# Patient Record
Sex: MALE | Race: WHITE | Employment: UNEMPLOYED | URBAN - NONMETROPOLITAN AREA
[De-identification: names, ages, dates, MRNs, and addresses within clinical notes are randomized per-mention and may not be internally consistent; named-entity substitution may affect disease eponyms.]

---

## 2017-02-03 ENCOUNTER — OFFICE VISIT (OUTPATIENT)
Dept: OTHER | Age: 57
End: 2017-02-03

## 2017-02-03 ENCOUNTER — HOSPITAL ENCOUNTER (OUTPATIENT)
Dept: OTHER | Age: 57
Discharge: HOME | End: 2017-02-03
Attending: NURSE PRACTITIONER | Admitting: NURSE PRACTITIONER

## 2017-02-03 VITALS
BODY MASS INDEX: 33.41 KG/M2 | DIASTOLIC BLOOD PRESSURE: 82 MMHG | SYSTOLIC BLOOD PRESSURE: 135 MMHG | WEIGHT: 207 LBS | HEART RATE: 90 BPM | OXYGEN SATURATION: 96 %

## 2017-02-03 DIAGNOSIS — E11.9 TYPE 2 DIABETES MELLITUS WITHOUT COMPLICATION, WITHOUT LONG-TERM CURRENT USE OF INSULIN (HCC): Primary | ICD-10-CM

## 2017-02-03 DIAGNOSIS — F33.1 MODERATE EPISODE OF RECURRENT MAJOR DEPRESSIVE DISORDER (HCC): ICD-10-CM

## 2017-02-03 DIAGNOSIS — F41.9 ANXIETY: ICD-10-CM

## 2017-02-03 DIAGNOSIS — Z00.00 HEALTH CARE MAINTENANCE: ICD-10-CM

## 2017-02-03 LAB — GLUCOSE BLD-MCNC: 94 MG/DL

## 2017-02-03 PROCEDURE — 96160 PT-FOCUSED HLTH RISK ASSMT: CPT | Performed by: NURSE PRACTITIONER

## 2017-02-03 PROCEDURE — 99203 OFFICE O/P NEW LOW 30 MIN: CPT | Performed by: NURSE PRACTITIONER

## 2017-02-03 RX ORDER — CETIRIZINE HYDROCHLORIDE 10 MG/1
10 TABLET ORAL DAILY
COMMUNITY
Start: 2017-01-20

## 2017-02-03 RX ORDER — POLYETHYLENE GLYCOL 3350 17 G/17G
POWDER, FOR SOLUTION ORAL
COMMUNITY
Start: 2017-01-09 | End: 2017-02-03

## 2017-02-03 RX ORDER — LORAZEPAM 0.5 MG/1
0.5 TABLET ORAL 2 TIMES DAILY
Qty: 60 TABLET | Refills: 0 | Status: SHIPPED | OUTPATIENT
Start: 2017-02-03 | End: 2017-02-17 | Stop reason: DRUGHIGH

## 2017-02-03 RX ORDER — CALCIUM CITRATE/VITAMIN D3 200MG-6.25
TABLET ORAL
COMMUNITY
Start: 2017-01-10 | End: 2017-02-03

## 2017-02-03 RX ORDER — FLUTICASONE PROPIONATE 50 MCG
1 SPRAY, SUSPENSION (ML) NASAL DAILY
COMMUNITY
Start: 2016-12-19 | End: 2017-02-28 | Stop reason: ALTCHOICE

## 2017-02-03 ASSESSMENT — PATIENT HEALTH QUESTIONNAIRE - PHQ9
7. TROUBLE CONCENTRATING ON THINGS, SUCH AS READING THE NEWSPAPER OR WATCHING TELEVISION: 3
1. LITTLE INTEREST OR PLEASURE IN DOING THINGS: 0
SUM OF ALL RESPONSES TO PHQ QUESTIONS 1-9: 12
6. FEELING BAD ABOUT YOURSELF - OR THAT YOU ARE A FAILURE OR HAVE LET YOURSELF OR YOUR FAMILY DOWN: 0
5. POOR APPETITE OR OVEREATING: 1
2. FEELING DOWN, DEPRESSED OR HOPELESS: 3
10. IF YOU CHECKED OFF ANY PROBLEMS, HOW DIFFICULT HAVE THESE PROBLEMS MADE IT FOR YOU TO DO YOUR WORK, TAKE CARE OF THINGS AT HOME, OR GET ALONG WITH OTHER PEOPLE: 3
9. THOUGHTS THAT YOU WOULD BE BETTER OFF DEAD, OR OF HURTING YOURSELF: 0
3. TROUBLE FALLING OR STAYING ASLEEP: 3
SUM OF ALL RESPONSES TO PHQ9 QUESTIONS 1 & 2: 3
4. FEELING TIRED OR HAVING LITTLE ENERGY: 1
8. MOVING OR SPEAKING SO SLOWLY THAT OTHER PEOPLE COULD HAVE NOTICED. OR THE OPPOSITE, BEING SO FIGETY OR RESTLESS THAT YOU HAVE BEEN MOVING AROUND A LOT MORE THAN USUAL: 1

## 2017-02-03 ASSESSMENT — ENCOUNTER SYMPTOMS
EYES NEGATIVE: 1
GASTROINTESTINAL NEGATIVE: 1
ALLERGIC/IMMUNOLOGIC NEGATIVE: 1
RESPIRATORY NEGATIVE: 1

## 2017-02-17 ENCOUNTER — HOSPITAL ENCOUNTER (OUTPATIENT)
Dept: OTHER | Age: 57
Discharge: HOME | End: 2017-02-17
Attending: NURSE PRACTITIONER | Admitting: NURSE PRACTITIONER

## 2017-02-17 ENCOUNTER — OFFICE VISIT (OUTPATIENT)
Dept: OTHER | Age: 57
End: 2017-02-17

## 2017-02-17 VITALS
BODY MASS INDEX: 34.06 KG/M2 | OXYGEN SATURATION: 95 % | WEIGHT: 211 LBS | HEART RATE: 95 BPM | DIASTOLIC BLOOD PRESSURE: 83 MMHG | SYSTOLIC BLOOD PRESSURE: 125 MMHG

## 2017-02-17 DIAGNOSIS — Z15.89 MONOALLELIC MUTATION OF SLC6A4 GENE: ICD-10-CM

## 2017-02-17 DIAGNOSIS — Z15.89 MONOALLELIC MUTATION OF CYP2D6 GENE: ICD-10-CM

## 2017-02-17 DIAGNOSIS — Z15.89: ICD-10-CM

## 2017-02-17 DIAGNOSIS — F41.9 ANXIETY: ICD-10-CM

## 2017-02-17 DIAGNOSIS — Z71.2 ENCOUNTER TO DISCUSS TEST RESULTS: ICD-10-CM

## 2017-02-17 DIAGNOSIS — J32.0 CHRONIC MAXILLARY SINUSITIS: ICD-10-CM

## 2017-02-17 DIAGNOSIS — F33.1 MODERATE EPISODE OF RECURRENT MAJOR DEPRESSIVE DISORDER (HCC): Primary | ICD-10-CM

## 2017-02-17 PROCEDURE — 99215 OFFICE O/P EST HI 40 MIN: CPT | Performed by: NURSE PRACTITIONER

## 2017-02-17 RX ORDER — AMOXICILLIN AND CLAVULANATE POTASSIUM 875; 125 MG/1; MG/1
1 TABLET, FILM COATED ORAL 2 TIMES DAILY
Qty: 20 TABLET | Refills: 0 | Status: SHIPPED | OUTPATIENT
Start: 2017-02-17 | End: 2017-02-27

## 2017-02-17 RX ORDER — BUPROPION HYDROCHLORIDE 75 MG/1
75 TABLET ORAL 2 TIMES DAILY
Qty: 60 TABLET | Refills: 3 | Status: SHIPPED | OUTPATIENT
Start: 2017-02-17 | End: 2017-03-14

## 2017-02-17 RX ORDER — LORAZEPAM 0.5 MG/1
0.5 TABLET ORAL 3 TIMES DAILY PRN
Qty: 90 TABLET | Refills: 0 | Status: SHIPPED | OUTPATIENT
Start: 2017-02-17 | End: 2017-03-14 | Stop reason: ALTCHOICE

## 2017-02-17 ASSESSMENT — ENCOUNTER SYMPTOMS
SINUS PRESSURE: 1
ALLERGIC/IMMUNOLOGIC NEGATIVE: 1
GASTROINTESTINAL NEGATIVE: 1
RESPIRATORY NEGATIVE: 1
EYES NEGATIVE: 1

## 2017-03-14 ENCOUNTER — OFFICE VISIT (OUTPATIENT)
Dept: FAMILY MEDICINE CLINIC | Age: 57
End: 2017-03-14

## 2017-03-14 VITALS
WEIGHT: 209 LBS | HEIGHT: 65 IN | BODY MASS INDEX: 34.82 KG/M2 | RESPIRATION RATE: 16 BRPM | DIASTOLIC BLOOD PRESSURE: 70 MMHG | SYSTOLIC BLOOD PRESSURE: 130 MMHG | HEART RATE: 68 BPM

## 2017-03-14 DIAGNOSIS — F13.20 BENZODIAZEPINE DEPENDENCE (HCC): ICD-10-CM

## 2017-03-14 DIAGNOSIS — Z51.81 MEDICATION MONITORING ENCOUNTER: ICD-10-CM

## 2017-03-14 DIAGNOSIS — E11.9 TYPE 2 DIABETES MELLITUS WITHOUT COMPLICATION, WITHOUT LONG-TERM CURRENT USE OF INSULIN (HCC): Primary | ICD-10-CM

## 2017-03-14 DIAGNOSIS — J01.00 ACUTE NON-RECURRENT MAXILLARY SINUSITIS: ICD-10-CM

## 2017-03-14 DIAGNOSIS — F41.9 ANXIETY: ICD-10-CM

## 2017-03-14 DIAGNOSIS — R00.2 PALPITATIONS: ICD-10-CM

## 2017-03-14 LAB
AMPHETAMINE SCREEN, URINE: NEGATIVE
BARBITURATE SCREEN, URINE: NEGATIVE
BENZODIAZEPINE SCREEN, URINE: NEGATIVE
COCAINE METABOLITE SCREEN URINE: NEGATIVE
HBA1C MFR BLD: 7.1 %
MDMA URINE: NORMAL
METHADONE SCREEN, URINE: NORMAL
METHAMPHETAMINE, URINE: NEGATIVE
OPIATE SCREEN URINE: NEGATIVE
OXYCODONE SCREEN URINE: NEGATIVE
PHENCYCLIDINE SCREEN URINE: NEGATIVE
PROPOXYPHENE SCREEN, URINE: NORMAL
THC: NEGATIVE
TRICYCLIC ANTIDEPRESSANTS, UR: NEGATIVE

## 2017-03-14 PROCEDURE — 83036 HEMOGLOBIN GLYCOSYLATED A1C: CPT | Performed by: NURSE PRACTITIONER

## 2017-03-14 PROCEDURE — 99214 OFFICE O/P EST MOD 30 MIN: CPT | Performed by: NURSE PRACTITIONER

## 2017-03-14 PROCEDURE — 80305 DRUG TEST PRSMV DIR OPT OBS: CPT | Performed by: NURSE PRACTITIONER

## 2017-03-14 RX ORDER — HYDROXYZINE PAMOATE 50 MG/1
50 CAPSULE ORAL 4 TIMES DAILY PRN
Qty: 120 CAPSULE | Refills: 0 | Status: ON HOLD | OUTPATIENT
Start: 2017-03-14 | End: 2017-03-19

## 2017-03-14 RX ORDER — DESVENLAFAXINE 50 MG/1
50 TABLET, EXTENDED RELEASE ORAL DAILY
Qty: 30 TABLET | Refills: 0 | Status: SHIPPED | OUTPATIENT
Start: 2017-03-14 | End: 2017-03-24 | Stop reason: CLARIF

## 2017-03-14 RX ORDER — LORAZEPAM 0.5 MG/1
TABLET ORAL
Qty: 18 TABLET | Refills: 0 | Status: SHIPPED | OUTPATIENT
Start: 2017-03-14 | End: 2017-03-24 | Stop reason: ALTCHOICE

## 2017-03-14 RX ORDER — DOXYCYCLINE HYCLATE 100 MG
100 TABLET ORAL 2 TIMES DAILY
Qty: 20 TABLET | Refills: 0 | Status: SHIPPED | OUTPATIENT
Start: 2017-03-14 | End: 2017-03-16

## 2017-03-14 ASSESSMENT — PATIENT HEALTH QUESTIONNAIRE - PHQ9
SUM OF ALL RESPONSES TO PHQ9 QUESTIONS 1 & 2: 2
1. LITTLE INTEREST OR PLEASURE IN DOING THINGS: 1
2. FEELING DOWN, DEPRESSED OR HOPELESS: 1
SUM OF ALL RESPONSES TO PHQ QUESTIONS 1-9: 2

## 2017-03-15 PROBLEM — F13.20 BENZODIAZEPINE DEPENDENCE (HCC): Status: ACTIVE | Noted: 2017-03-15

## 2017-03-15 PROBLEM — Z51.81 MEDICATION MONITORING ENCOUNTER: Status: ACTIVE | Noted: 2017-03-15

## 2017-03-15 ASSESSMENT — ENCOUNTER SYMPTOMS
EYES NEGATIVE: 1
COUGH: 0
STRIDOR: 0
SHORTNESS OF BREATH: 0

## 2017-03-16 ENCOUNTER — NURSE TRIAGE (OUTPATIENT)
Dept: ADMINISTRATIVE | Age: 57
End: 2017-03-16

## 2017-03-17 ENCOUNTER — TELEPHONE (OUTPATIENT)
Dept: FAMILY MEDICINE CLINIC | Age: 57
End: 2017-03-17

## 2017-03-23 ENCOUNTER — TELEPHONE (OUTPATIENT)
Dept: FAMILY MEDICINE CLINIC | Age: 57
End: 2017-03-23

## 2017-03-24 ENCOUNTER — OFFICE VISIT (OUTPATIENT)
Dept: FAMILY MEDICINE CLINIC | Age: 57
End: 2017-03-24

## 2017-03-24 VITALS
WEIGHT: 204 LBS | HEART RATE: 58 BPM | DIASTOLIC BLOOD PRESSURE: 80 MMHG | BODY MASS INDEX: 33.99 KG/M2 | HEIGHT: 65 IN | RESPIRATION RATE: 20 BRPM | OXYGEN SATURATION: 97 % | SYSTOLIC BLOOD PRESSURE: 122 MMHG

## 2017-03-24 DIAGNOSIS — F41.9 ANXIETY: ICD-10-CM

## 2017-03-24 DIAGNOSIS — F13.20 BENZODIAZEPINE DEPENDENCE (HCC): ICD-10-CM

## 2017-03-24 DIAGNOSIS — Z51.81 MEDICATION MONITORING ENCOUNTER: Primary | ICD-10-CM

## 2017-03-24 DIAGNOSIS — E11.9 TYPE 2 DIABETES MELLITUS WITHOUT COMPLICATION, WITHOUT LONG-TERM CURRENT USE OF INSULIN (HCC): ICD-10-CM

## 2017-03-24 DIAGNOSIS — K21.9 GASTROESOPHAGEAL REFLUX DISEASE WITHOUT ESOPHAGITIS: Primary | ICD-10-CM

## 2017-03-24 DIAGNOSIS — Z51.81 MEDICATION MONITORING ENCOUNTER: ICD-10-CM

## 2017-03-24 DIAGNOSIS — F13.20 BENZODIAZEPINE DEPENDENCE (HCC): Primary | ICD-10-CM

## 2017-03-24 LAB
AMPHETAMINE SCREEN, URINE: NORMAL
BARBITURATE SCREEN, URINE: NORMAL
BENZODIAZEPINE SCREEN, URINE: NORMAL
COCAINE METABOLITE SCREEN URINE: NORMAL
MDMA URINE: NORMAL
METHADONE SCREEN, URINE: NORMAL
METHAMPHETAMINE, URINE: NORMAL
OPIATE SCREEN URINE: NORMAL
OXYCODONE SCREEN URINE: NORMAL
PHENCYCLIDINE SCREEN URINE: NORMAL
PROPOXYPHENE SCREEN, URINE: NORMAL
THC: NORMAL
TRICYCLIC ANTIDEPRESSANTS, UR: NORMAL

## 2017-03-24 PROCEDURE — 99214 OFFICE O/P EST MOD 30 MIN: CPT | Performed by: NURSE PRACTITIONER

## 2017-03-24 PROCEDURE — 80305 DRUG TEST PRSMV DIR OPT OBS: CPT | Performed by: NURSE PRACTITIONER

## 2017-03-24 RX ORDER — RANITIDINE 150 MG/1
150 TABLET ORAL NIGHTLY
Qty: 30 TABLET | Refills: 1 | Status: SHIPPED | OUTPATIENT
Start: 2017-03-24 | End: 2017-04-24

## 2017-03-24 RX ORDER — CLONIDINE HYDROCHLORIDE 0.1 MG/1
TABLET ORAL
Qty: 60 TABLET | Refills: 1 | Status: SHIPPED | OUTPATIENT
Start: 2017-03-24 | End: 2017-03-28

## 2017-03-24 ASSESSMENT — ENCOUNTER SYMPTOMS: RESPIRATORY NEGATIVE: 1

## 2017-03-25 ENCOUNTER — NURSE TRIAGE (OUTPATIENT)
Dept: ADMINISTRATIVE | Age: 57
End: 2017-03-25

## 2017-03-28 ENCOUNTER — OFFICE VISIT (OUTPATIENT)
Dept: PSYCHOLOGY | Age: 57
End: 2017-03-28

## 2017-03-28 ENCOUNTER — OFFICE VISIT (OUTPATIENT)
Dept: FAMILY MEDICINE CLINIC | Age: 57
End: 2017-03-28

## 2017-03-28 VITALS
BODY MASS INDEX: 32.6 KG/M2 | DIASTOLIC BLOOD PRESSURE: 78 MMHG | SYSTOLIC BLOOD PRESSURE: 124 MMHG | RESPIRATION RATE: 16 BRPM | HEART RATE: 104 BPM | WEIGHT: 202 LBS

## 2017-03-28 DIAGNOSIS — Z76.5 DRUG-SEEKING BEHAVIOR: ICD-10-CM

## 2017-03-28 DIAGNOSIS — F41.9 ANXIETY: Primary | ICD-10-CM

## 2017-03-28 DIAGNOSIS — Z00.00 ROUTINE HEALTH MAINTENANCE: ICD-10-CM

## 2017-03-28 LAB
6-ACETYLMORPHINE: NOT DETECTED
7-AMINOCLONAZEPAM: PRESENT
A/G RATIO: 1.3 (ref 1.1–2.2)
ALBUMIN SERPL-MCNC: 4 G/DL (ref 3.4–5)
ALP BLD-CCNC: 68 U/L (ref 40–129)
ALPHA-OH-ALPRAZOLAM: NOT DETECTED
ALPRAZOLAM: NOT DETECTED
ALT SERPL-CCNC: 25 U/L (ref 10–40)
AMPHETAMINE: NOT DETECTED
ANION GAP SERPL CALCULATED.3IONS-SCNC: 19 MMOL/L (ref 3–16)
AST SERPL-CCNC: 19 U/L (ref 15–37)
BARBITURATES: NOT DETECTED
BENZOYLECGONINE: NOT DETECTED
BILIRUB SERPL-MCNC: 0.7 MG/DL (ref 0–1)
BUN BLDV-MCNC: 16 MG/DL (ref 7–20)
BUPRENORPHINE: NOT DETECTED
CALCIUM SERPL-MCNC: 9.2 MG/DL (ref 8.3–10.6)
CARISOPRODOL: NOT DETECTED
CHLORIDE BLD-SCNC: 101 MMOL/L (ref 99–110)
CHOLESTEROL, TOTAL: 184 MG/DL (ref 0–199)
CLONAZEPAM: NOT DETECTED
CO2: 21 MMOL/L (ref 21–32)
CODEINE: NOT DETECTED
CREAT SERPL-MCNC: 1.1 MG/DL (ref 0.9–1.3)
CREATININE URINE: 291.1 MG/DL (ref 20–400)
DIAZEPAM: NOT DETECTED
DRUGS EXPECTED: NORMAL
EER PAIN MGT DRUG PANEL, HIGH RES/EMIT U: NORMAL
ETHYL GLUCURONIDE: NOT DETECTED
FENTANYL: NOT DETECTED
GFR AFRICAN AMERICAN: >60
GFR NON-AFRICAN AMERICAN: >60
GLOBULIN: 3 G/DL
GLUCOSE BLD-MCNC: 154 MG/DL (ref 70–99)
HCT VFR BLD CALC: 52 % (ref 40.5–52.5)
HDLC SERPL-MCNC: 34 MG/DL (ref 40–60)
HEMOGLOBIN: 17.2 G/DL (ref 13.5–17.5)
HEPATITIS C ANTIBODY INTERPRETATION: NORMAL
HYDROCODONE: NOT DETECTED
HYDROMORPHONE: NOT DETECTED
LDL CHOLESTEROL CALCULATED: 119 MG/DL
LORAZEPAM: PRESENT
MARIJUANA METABOLITE: NOT DETECTED
MCH RBC QN AUTO: 30.5 PG (ref 26–34)
MCHC RBC AUTO-ENTMCNC: 33.1 G/DL (ref 31–36)
MCV RBC AUTO: 92.2 FL (ref 80–100)
MDA: NOT DETECTED
MDEA: NOT DETECTED
MDMA URINE: NOT DETECTED
MEPERIDINE: NOT DETECTED
METHADONE: NOT DETECTED
METHAMPHETAMINE: NOT DETECTED
METHYLPHENIDATE: NOT DETECTED
MIDAZOLAM: NOT DETECTED
MORPHINE: NOT DETECTED
NORBUPRENORPHINE, FREE: NOT DETECTED
NORDIAZEPAM: NOT DETECTED
NORFENTANYL: NOT DETECTED
NORHYDROCODONE, URINE: NOT DETECTED
NOROXYCODONE: NOT DETECTED
NOROXYMORPHONE, URINE: NOT DETECTED
OXAZEPAM: NOT DETECTED
OXYCODONE: NOT DETECTED
OXYMORPHONE: NOT DETECTED
PAIN MANAGEMENT DRUG PANEL: NORMAL
PAIN MANAGEMENT DRUG PANEL: NORMAL
PCP: NOT DETECTED
PDW BLD-RTO: 13.9 % (ref 12.4–15.4)
PHENTERMINE: NOT DETECTED
PLATELET # BLD: 229 K/UL (ref 135–450)
PMV BLD AUTO: 8.6 FL (ref 5–10.5)
POTASSIUM SERPL-SCNC: 4.2 MMOL/L (ref 3.5–5.1)
PROPOXYPHENE: NOT DETECTED
RBC # BLD: 5.64 M/UL (ref 4.2–5.9)
SODIUM BLD-SCNC: 141 MMOL/L (ref 136–145)
TAPENTADOL, URINE: NOT DETECTED
TAPENTADOL-O-SULFATE, URINE: NOT DETECTED
TEMAZEPAM: NOT DETECTED
TOTAL PROTEIN: 7 G/DL (ref 6.4–8.2)
TRAMADOL: NOT DETECTED
TRIGL SERPL-MCNC: 155 MG/DL (ref 0–150)
TSH REFLEX: 1.69 UIU/ML (ref 0.27–4.2)
VLDLC SERPL CALC-MCNC: 31 MG/DL
WBC # BLD: 7.9 K/UL (ref 4–11)
ZOLPIDEM: NOT DETECTED

## 2017-03-28 PROCEDURE — 90791 PSYCH DIAGNOSTIC EVALUATION: CPT | Performed by: PSYCHOLOGIST

## 2017-03-28 PROCEDURE — 80305 DRUG TEST PRSMV DIR OPT OBS: CPT | Performed by: NURSE PRACTITIONER

## 2017-03-28 PROCEDURE — 99214 OFFICE O/P EST MOD 30 MIN: CPT | Performed by: NURSE PRACTITIONER

## 2017-03-28 RX ORDER — BUSPIRONE HYDROCHLORIDE 15 MG/1
10 TABLET ORAL 3 TIMES DAILY PRN
Qty: 60 TABLET | Refills: 0 | Status: SHIPPED | OUTPATIENT
Start: 2017-03-28 | End: 2017-04-08

## 2017-03-28 ASSESSMENT — ENCOUNTER SYMPTOMS
WHEEZING: 0
SHORTNESS OF BREATH: 0
COUGH: 0
GASTROINTESTINAL NEGATIVE: 1

## 2017-03-29 LAB
AMPHETAMINE SCREEN, URINE: NORMAL
BARBITURATE SCREEN, URINE: NORMAL
BENZODIAZEPINE SCREEN, URINE: NORMAL
COCAINE METABOLITE SCREEN URINE: NORMAL
HIV-1 AND HIV-2 ANTIBODIES: NORMAL
MDMA URINE: NORMAL
METHADONE SCREEN, URINE: NORMAL
METHAMPHETAMINE, URINE: NORMAL
OPIATE SCREEN URINE: NORMAL
OXYCODONE SCREEN URINE: NORMAL
PHENCYCLIDINE SCREEN URINE: NORMAL
PROPOXYPHENE SCREEN, URINE: NORMAL
THC: NORMAL
TRICYCLIC ANTIDEPRESSANTS, UR: NORMAL

## 2017-04-04 ENCOUNTER — OFFICE VISIT (OUTPATIENT)
Dept: FAMILY MEDICINE CLINIC | Age: 57
End: 2017-04-04

## 2017-04-04 ENCOUNTER — TELEPHONE (OUTPATIENT)
Dept: FAMILY MEDICINE CLINIC | Age: 57
End: 2017-04-04

## 2017-04-04 ENCOUNTER — OFFICE VISIT (OUTPATIENT)
Dept: PSYCHOLOGY | Age: 57
End: 2017-04-04

## 2017-04-04 VITALS
DIASTOLIC BLOOD PRESSURE: 84 MMHG | BODY MASS INDEX: 32.82 KG/M2 | WEIGHT: 197 LBS | HEART RATE: 71 BPM | SYSTOLIC BLOOD PRESSURE: 120 MMHG | HEIGHT: 65 IN | OXYGEN SATURATION: 98 % | RESPIRATION RATE: 20 BRPM

## 2017-04-04 DIAGNOSIS — F13.20 BENZODIAZEPINE DEPENDENCE (HCC): ICD-10-CM

## 2017-04-04 DIAGNOSIS — F41.9 ANXIETY: Primary | ICD-10-CM

## 2017-04-04 DIAGNOSIS — Z91.09 ENVIRONMENTAL ALLERGIES: ICD-10-CM

## 2017-04-04 LAB
6-ACETYLMORPHINE: NOT DETECTED
7-AMINOCLONAZEPAM: NOT DETECTED
ALPHA-OH-ALPRAZOLAM: NOT DETECTED
ALPRAZOLAM: NOT DETECTED
AMPHETAMINE: NOT DETECTED
BARBITURATES: NOT DETECTED
BENZOYLECGONINE: NOT DETECTED
BUPRENORPHINE: NOT DETECTED
CARISOPRODOL: NOT DETECTED
CLONAZEPAM: NOT DETECTED
CODEINE: NOT DETECTED
CREATININE URINE: >400 MG/DL (ref 20–400)
DIAZEPAM: NOT DETECTED
DRUGS EXPECTED: ABNORMAL
EER PAIN MGT DRUG PANEL, HIGH RES/EMIT U: ABNORMAL
ETHYL GLUCURONIDE: NOT DETECTED
FENTANYL: NOT DETECTED
HYDROCODONE: NOT DETECTED
HYDROMORPHONE: NOT DETECTED
LORAZEPAM: NOT DETECTED
MARIJUANA METABOLITE: NOT DETECTED
MDA: NOT DETECTED
MDEA: NOT DETECTED
MDMA URINE: NOT DETECTED
MEPERIDINE: NOT DETECTED
METHADONE: NOT DETECTED
METHAMPHETAMINE: NOT DETECTED
METHYLPHENIDATE: NOT DETECTED
MIDAZOLAM: NOT DETECTED
MORPHINE: NOT DETECTED
NORBUPRENORPHINE, FREE: NOT DETECTED
NORDIAZEPAM: NOT DETECTED
NORFENTANYL: NOT DETECTED
NORHYDROCODONE, URINE: NOT DETECTED
NOROXYCODONE: NOT DETECTED
NOROXYMORPHONE, URINE: NOT DETECTED
OXAZEPAM: NOT DETECTED
OXYCODONE: NOT DETECTED
OXYMORPHONE: NOT DETECTED
PAIN MANAGEMENT DRUG PANEL: ABNORMAL
PAIN MANAGEMENT DRUG PANEL: ABNORMAL
PCP: NOT DETECTED
PHENTERMINE: NOT DETECTED
PROPOXYPHENE: NOT DETECTED
TAPENTADOL, URINE: NOT DETECTED
TAPENTADOL-O-SULFATE, URINE: NOT DETECTED
TEMAZEPAM: NOT DETECTED
TRAMADOL: NOT DETECTED
ZOLPIDEM: PRESENT

## 2017-04-04 PROCEDURE — 99214 OFFICE O/P EST MOD 30 MIN: CPT | Performed by: NURSE PRACTITIONER

## 2017-04-04 PROCEDURE — 90832 PSYTX W PT 30 MINUTES: CPT | Performed by: PSYCHOLOGIST

## 2017-04-04 ASSESSMENT — ENCOUNTER SYMPTOMS
SORE THROAT: 0
GASTROINTESTINAL NEGATIVE: 1
RESPIRATORY NEGATIVE: 1

## 2017-04-14 DIAGNOSIS — F41.9 ANXIETY: ICD-10-CM

## 2017-04-14 RX ORDER — BUSPIRONE HYDROCHLORIDE 15 MG/1
TABLET ORAL
Qty: 60 TABLET | Refills: 0 | Status: SHIPPED | OUTPATIENT
Start: 2017-04-14 | End: 2017-04-24 | Stop reason: ALTCHOICE

## 2017-04-17 ENCOUNTER — TELEPHONE (OUTPATIENT)
Dept: FAMILY MEDICINE CLINIC | Age: 57
End: 2017-04-17

## 2017-04-20 ENCOUNTER — OFFICE VISIT (OUTPATIENT)
Dept: CARDIOLOGY CLINIC | Age: 57
End: 2017-04-20

## 2017-04-20 VITALS
DIASTOLIC BLOOD PRESSURE: 70 MMHG | BODY MASS INDEX: 31.49 KG/M2 | HEIGHT: 65 IN | WEIGHT: 189 LBS | HEART RATE: 63 BPM | OXYGEN SATURATION: 97 % | SYSTOLIC BLOOD PRESSURE: 100 MMHG

## 2017-04-20 DIAGNOSIS — R00.2 PALPITATIONS: ICD-10-CM

## 2017-04-20 DIAGNOSIS — I48.0 PAF (PAROXYSMAL ATRIAL FIBRILLATION) (HCC): Primary | ICD-10-CM

## 2017-04-20 DIAGNOSIS — E11.9 TYPE 2 DIABETES MELLITUS WITHOUT COMPLICATION, WITHOUT LONG-TERM CURRENT USE OF INSULIN (HCC): ICD-10-CM

## 2017-04-20 DIAGNOSIS — G47.33 OSA (OBSTRUCTIVE SLEEP APNEA): ICD-10-CM

## 2017-04-20 DIAGNOSIS — R10.13 EPIGASTRIC PAIN: ICD-10-CM

## 2017-04-20 DIAGNOSIS — R07.2 PRECORDIAL PAIN: ICD-10-CM

## 2017-04-20 DIAGNOSIS — I10 ESSENTIAL HYPERTENSION: ICD-10-CM

## 2017-04-20 DIAGNOSIS — J01.00 ACUTE NON-RECURRENT MAXILLARY SINUSITIS: ICD-10-CM

## 2017-04-20 PROCEDURE — 99214 OFFICE O/P EST MOD 30 MIN: CPT | Performed by: INTERNAL MEDICINE

## 2017-04-20 RX ORDER — FLUTICASONE PROPIONATE 50 MCG
1 SPRAY, SUSPENSION (ML) NASAL DAILY
COMMUNITY
End: 2018-01-22

## 2017-04-25 ENCOUNTER — PROCEDURE VISIT (OUTPATIENT)
Dept: CARDIOLOGY CLINIC | Age: 57
End: 2017-04-25

## 2017-04-25 DIAGNOSIS — I48.0 PAF (PAROXYSMAL ATRIAL FIBRILLATION) (HCC): ICD-10-CM

## 2017-04-25 DIAGNOSIS — R00.2 PALPITATIONS: ICD-10-CM

## 2017-04-25 DIAGNOSIS — J01.00 ACUTE NON-RECURRENT MAXILLARY SINUSITIS: ICD-10-CM

## 2017-04-25 DIAGNOSIS — R10.13 EPIGASTRIC PAIN: ICD-10-CM

## 2017-04-25 DIAGNOSIS — I10 ESSENTIAL HYPERTENSION: ICD-10-CM

## 2017-04-25 DIAGNOSIS — E11.9 TYPE 2 DIABETES MELLITUS WITHOUT COMPLICATION, WITHOUT LONG-TERM CURRENT USE OF INSULIN (HCC): ICD-10-CM

## 2017-04-25 DIAGNOSIS — G47.33 OSA (OBSTRUCTIVE SLEEP APNEA): ICD-10-CM

## 2017-04-25 DIAGNOSIS — R07.2 PRECORDIAL PAIN: ICD-10-CM

## 2017-04-25 LAB
LV EF: 67 %
LVEF MODALITY: NORMAL

## 2017-04-25 PROCEDURE — 78452 HT MUSCLE IMAGE SPECT MULT: CPT | Performed by: INTERNAL MEDICINE

## 2017-04-25 PROCEDURE — A9500 TC99M SESTAMIBI: HCPCS | Performed by: INTERNAL MEDICINE

## 2017-04-25 PROCEDURE — 93018 CV STRESS TEST I&R ONLY: CPT | Performed by: INTERNAL MEDICINE

## 2017-04-25 PROCEDURE — 93017 CV STRESS TEST TRACING ONLY: CPT | Performed by: INTERNAL MEDICINE

## 2017-04-25 PROCEDURE — 93016 CV STRESS TEST SUPVJ ONLY: CPT | Performed by: INTERNAL MEDICINE

## 2017-04-26 ENCOUNTER — TELEPHONE (OUTPATIENT)
Dept: CARDIOLOGY CLINIC | Age: 57
End: 2017-04-26

## 2017-05-08 ENCOUNTER — HOSPITAL ENCOUNTER (OUTPATIENT)
Dept: GENERAL RADIOLOGY | Age: 57
Discharge: HOME | End: 2017-05-08
Attending: SPECIALIST | Admitting: SPECIALIST

## 2017-05-08 DIAGNOSIS — K21.9 GASTROESOPHAGEAL REFLUX DISEASE, ESOPHAGITIS PRESENCE NOT SPECIFIED: ICD-10-CM

## 2017-05-08 DIAGNOSIS — Z80.0 FAMILY HISTORY OF MALIGNANT NEOPLASM OF GASTROINTESTINAL TRACT: ICD-10-CM

## 2017-05-08 DIAGNOSIS — K21.9 GASTRO-ESOPHAGEAL REFLUX DISEASE WITHOUT ESOPHAGITIS: ICD-10-CM

## 2017-05-12 ENCOUNTER — NURSE TRIAGE (OUTPATIENT)
Dept: ADMINISTRATIVE | Age: 57
End: 2017-05-12

## 2017-05-16 ENCOUNTER — OFFICE VISIT (OUTPATIENT)
Dept: SURGERY | Age: 57
End: 2017-05-16

## 2017-05-16 VITALS
RESPIRATION RATE: 18 BRPM | HEART RATE: 84 BPM | DIASTOLIC BLOOD PRESSURE: 72 MMHG | BODY MASS INDEX: 29.19 KG/M2 | WEIGHT: 175.2 LBS | HEIGHT: 65 IN | SYSTOLIC BLOOD PRESSURE: 128 MMHG

## 2017-05-16 DIAGNOSIS — R10.13 EPIGASTRIC ABDOMINAL PAIN: Primary | ICD-10-CM

## 2017-05-16 PROCEDURE — 99213 OFFICE O/P EST LOW 20 MIN: CPT | Performed by: SURGERY

## 2017-05-16 ASSESSMENT — ENCOUNTER SYMPTOMS
EYES NEGATIVE: 1
ABDOMINAL PAIN: 1
NAUSEA: 1
RESPIRATORY NEGATIVE: 1

## 2017-05-17 ENCOUNTER — TELEPHONE (OUTPATIENT)
Dept: CARDIOLOGY CLINIC | Age: 57
End: 2017-05-17

## 2017-06-15 ENCOUNTER — HOSPITAL ENCOUNTER (OUTPATIENT)
Dept: GENERAL RADIOLOGY | Age: 57
Discharge: HOME | End: 2017-06-15
Attending: INTERNAL MEDICINE | Admitting: INTERNAL MEDICINE

## 2017-06-15 DIAGNOSIS — R06.02 SOB (SHORTNESS OF BREATH): ICD-10-CM

## 2017-06-26 ENCOUNTER — HOSPITAL ENCOUNTER (OUTPATIENT)
Dept: CARDIAC REHAB | Age: 57
Discharge: HOME | End: 2017-06-26
Attending: INTERNAL MEDICINE | Admitting: INTERNAL MEDICINE

## 2017-06-28 PROBLEM — G47.33 OBSTRUCTIVE SLEEP APNEA: Status: ACTIVE | Noted: 2017-06-28

## 2017-07-17 ENCOUNTER — OFFICE VISIT (OUTPATIENT)
Dept: CARDIOLOGY CLINIC | Age: 57
End: 2017-07-17

## 2017-07-17 VITALS
RESPIRATION RATE: 16 BRPM | HEIGHT: 65 IN | DIASTOLIC BLOOD PRESSURE: 70 MMHG | HEART RATE: 68 BPM | BODY MASS INDEX: 27.16 KG/M2 | WEIGHT: 163 LBS | SYSTOLIC BLOOD PRESSURE: 110 MMHG

## 2017-07-17 DIAGNOSIS — E78.2 MIXED HYPERLIPIDEMIA: ICD-10-CM

## 2017-07-17 DIAGNOSIS — G47.33 OSA (OBSTRUCTIVE SLEEP APNEA): ICD-10-CM

## 2017-07-17 DIAGNOSIS — F41.9 ANXIETY: ICD-10-CM

## 2017-07-17 DIAGNOSIS — I10 ESSENTIAL HYPERTENSION: Primary | ICD-10-CM

## 2017-07-17 DIAGNOSIS — E11.00 TYPE 2 DIABETES MELLITUS WITH HYPEROSMOLARITY WITHOUT COMA, WITHOUT LONG-TERM CURRENT USE OF INSULIN (HCC): ICD-10-CM

## 2017-07-17 PROCEDURE — 99213 OFFICE O/P EST LOW 20 MIN: CPT | Performed by: INTERNAL MEDICINE

## 2017-08-07 ENCOUNTER — NURSE TRIAGE (OUTPATIENT)
Dept: ADMINISTRATIVE | Age: 57
End: 2017-08-07

## 2017-09-14 PROBLEM — I48.91 ATRIAL FIBRILLATION WITH RVR (HCC): Status: ACTIVE | Noted: 2017-09-14

## 2017-09-15 PROBLEM — I48.91 ATRIAL FIBRILLATION WITH RVR (HCC): Status: RESOLVED | Noted: 2017-09-14 | Resolved: 2017-09-15

## 2017-09-18 ENCOUNTER — OFFICE VISIT (OUTPATIENT)
Dept: CARDIOLOGY CLINIC | Age: 57
End: 2017-09-18

## 2017-09-18 ENCOUNTER — NURSE ONLY (OUTPATIENT)
Dept: CARDIOLOGY CLINIC | Age: 57
End: 2017-09-18

## 2017-09-18 VITALS
BODY MASS INDEX: 28.32 KG/M2 | RESPIRATION RATE: 16 BRPM | WEIGHT: 170 LBS | DIASTOLIC BLOOD PRESSURE: 72 MMHG | SYSTOLIC BLOOD PRESSURE: 118 MMHG | HEART RATE: 80 BPM | HEIGHT: 65 IN

## 2017-09-18 DIAGNOSIS — I48.0 PAF (PAROXYSMAL ATRIAL FIBRILLATION) (HCC): Primary | ICD-10-CM

## 2017-09-18 DIAGNOSIS — E78.2 MIXED HYPERLIPIDEMIA: ICD-10-CM

## 2017-09-18 DIAGNOSIS — I10 ESSENTIAL HYPERTENSION: ICD-10-CM

## 2017-09-18 DIAGNOSIS — G47.33 OSA (OBSTRUCTIVE SLEEP APNEA): ICD-10-CM

## 2017-09-18 DIAGNOSIS — E11.00 TYPE 2 DIABETES MELLITUS WITH HYPEROSMOLARITY WITHOUT COMA, WITHOUT LONG-TERM CURRENT USE OF INSULIN (HCC): ICD-10-CM

## 2017-09-18 DIAGNOSIS — F41.9 ANXIETY: ICD-10-CM

## 2017-09-18 DIAGNOSIS — R00.2 PALPITATIONS: ICD-10-CM

## 2017-09-18 PROCEDURE — 93270 REMOTE 30 DAY ECG REV/REPORT: CPT | Performed by: INTERNAL MEDICINE

## 2017-09-18 PROCEDURE — 99214 OFFICE O/P EST MOD 30 MIN: CPT | Performed by: INTERNAL MEDICINE

## 2017-09-21 ENCOUNTER — TELEPHONE (OUTPATIENT)
Dept: CARDIOLOGY CLINIC | Age: 57
End: 2017-09-21

## 2017-09-22 ENCOUNTER — TELEPHONE (OUTPATIENT)
Dept: CARDIOLOGY CLINIC | Age: 57
End: 2017-09-22

## 2017-10-20 PROCEDURE — 93272 ECG/REVIEW INTERPRET ONLY: CPT | Performed by: INTERNAL MEDICINE

## 2017-10-23 ENCOUNTER — TELEPHONE (OUTPATIENT)
Dept: CARDIOLOGY CLINIC | Age: 57
End: 2017-10-23

## 2017-10-30 ENCOUNTER — OFFICE VISIT (OUTPATIENT)
Dept: CARDIOLOGY CLINIC | Age: 57
End: 2017-10-30

## 2017-10-30 VITALS
WEIGHT: 166 LBS | HEIGHT: 65 IN | HEART RATE: 60 BPM | BODY MASS INDEX: 27.66 KG/M2 | SYSTOLIC BLOOD PRESSURE: 112 MMHG | RESPIRATION RATE: 16 BRPM | DIASTOLIC BLOOD PRESSURE: 62 MMHG

## 2017-10-30 DIAGNOSIS — I10 ESSENTIAL HYPERTENSION: Primary | ICD-10-CM

## 2017-10-30 DIAGNOSIS — R00.2 PALPITATIONS: ICD-10-CM

## 2017-10-30 DIAGNOSIS — F41.9 ANXIETY: ICD-10-CM

## 2017-10-30 DIAGNOSIS — E11.00 TYPE 2 DIABETES MELLITUS WITH HYPEROSMOLARITY WITHOUT COMA, WITHOUT LONG-TERM CURRENT USE OF INSULIN (HCC): ICD-10-CM

## 2017-10-30 DIAGNOSIS — E78.2 MIXED HYPERLIPIDEMIA: ICD-10-CM

## 2017-10-30 PROCEDURE — 99213 OFFICE O/P EST LOW 20 MIN: CPT | Performed by: INTERNAL MEDICINE

## 2017-10-30 PROCEDURE — 3045F PR MOST RECENT HEMOGLOBIN A1C LEVEL 7.0-9.0%: CPT | Performed by: INTERNAL MEDICINE

## 2017-10-30 PROCEDURE — 3017F COLORECTAL CA SCREEN DOC REV: CPT | Performed by: INTERNAL MEDICINE

## 2017-10-30 PROCEDURE — G8427 DOCREV CUR MEDS BY ELIG CLIN: HCPCS | Performed by: INTERNAL MEDICINE

## 2017-10-30 PROCEDURE — G8484 FLU IMMUNIZE NO ADMIN: HCPCS | Performed by: INTERNAL MEDICINE

## 2017-10-30 PROCEDURE — G8417 CALC BMI ABV UP PARAM F/U: HCPCS | Performed by: INTERNAL MEDICINE

## 2017-10-30 PROCEDURE — 4004F PT TOBACCO SCREEN RCVD TLK: CPT | Performed by: INTERNAL MEDICINE

## 2017-10-30 NOTE — PROGRESS NOTES
Kameron Edouard  1960  [place], MD      Chief complaint and HPI:  Kameron Edouard  56years old continues to c/o palpitations and anxiety. Has had multiple ED visits. Worries about everything in life. Worries about  paroxysmal atrial fibrillation, hypertension, hyperlipidemia and shortness of breath. He states he gets shortness of breath after he eats. He states he has severe stomach problems. He denies chest pain, dizziness, edema or palpitations. He takes Tricor. He had last cholesterol check done in March. He is a former smoker. He states he has a lot of anxiety. He states he quit taking Eliquis because he was feeling numbness in his legs and hands. Rest of the Cardiovascular system review is otherwise unchanged from prior encounter. Past medical history:  has a past medical history of Acid reflux; Anesthesia; Anxiety; Arthritis; Back pain; Depression; Diabetes mellitus ([address] DM type 2 (diabetes mellitus, type 2) ([address] Fluttering heart; H/O cardiovascular stress test; H/O echocardiogram; Hernia; History of cardiac monitoring; HX OTHER MEDICAL; Hyperlipidemia; Hypertension; Liver disease; Nausea & vomiting; Osteoarthritis; PAF (paroxysmal atrial fibrillation) ([address] Panic attacks; Pneumonia; Prolonged emergence from general anesthesia; Shortness of breath; Sinusitis; Tobacco abuse; Wears dentures; and Wears glasses. Past surgical history:  has a past surgical history that includes hip surgery; sinus surgery; Upper gastrointestinal endoscopy; hip surgery (Right, 1978); Dental surgery; Colonoscopy (1-13); Endoscopy, colon, diagnostic (Last Done 1-13); fracture surgery (Left); sinus surgery (2010); Cholecystectomy (2/18/13); Gallbladder surgery; sinus surgery (07/2014); and other surgical history (Right, 12/13/2016).     Social History:   Social History   Substance Use Topics    Smoking status: Former Smoker     Packs/day: 1.00     Years: 4.00     Quit date: 2/15/1987    Smokeless (ATIVAN) 1 MG tablet Take 1 mg by mouth every 8 hours as needed    Yes Historical Provider, MD   fluticasone (FLONASE) 50 MCG/ACT nasal spray 1 spray by Nasal route daily   Yes Historical Provider, MD   zolpidem (AMBIEN) 10 MG tablet Take by mouth nightly as needed for Sleep   Yes Historical Provider, MD   cetirizine (ZYRTEC) 10 MG tablet Take 10 mg by mouth daily  1/20/17  Yes Historical Provider, MD   fenofibrate (TRICOR) 54 MG tablet Take 54 mg by mouth daily. s [facility] pharmacy: Please dispense generic fenofibrate unless prescriber denote   Yes Historical Provider, MD     Constitutional:  /62 (Site: Left Arm, Position: Sitting, Cuff Size: Medium Adult)   Pulse 60   Resp 16   Ht 5' 5\" (1.651 m)   Wt 166 lb (75.3 kg)   BMI 27.62 kg/m²    Body mass index is 27.62 kg/m². Wt Readings from Last 3 Encounters:   10/30/17 166 lb (75.3 kg)   09/18/17 170 lb (77.1 kg)   09/14/17 165 lb 6.4 oz (75 kg)     Chest:  Normal in shape and excursion     Cardiac:  Normal first and second heart sounds      Respiratory:  Normal breath sounds without wheezing or crepitations.     Andomen:  Soft non tender, no organomegaly. Bowel sounds are normal.     Extremities:  No cyanosis, clubbing or edema.     Vascular: +2 peripheral pulses in all four extremities.     Neuro Exam:  Non focal for any sensory or motor deficit.     Skin:  Warm and well perfused. Event monitor reported    Patient was monitored from September 18 through October 17, 2017.  12 transmissions are submitted for review during this period and all are consistent with normal sinus rhythm.  Heart rate ranged from 64 to106 bpm.  Patient reported palpitations during heart rate ranging from 72 to 106 bpm on multiple days.  Isolated PACs noted without any other clinically significant arrhythmias.     Conclusion: Findings suggesting normal sinus rhythm with physiologic heart rate variations and symptoms of palpitations during normal rate and rhythm and during mild degree of sinus tachycardia.     LAB REVIEW:  CBC:   Lab Results   Component Value Date    WBC 7.2 09/15/2017    HGB 16.4 09/15/2017    HCT 49.2 09/15/2017     09/15/2017     Lipids:   Lab Results   Component Value Date    CHOL 184 03/28/2017    TRIG 155 (H) 03/28/2017    HDL 34 (L) 03/28/2017    LDLCALC 119 (H) 03/28/2017    LDLDIRECT 146 (H) 06/16/2015    LABVLDL 31 03/28/2017     Renal:   Lab Results   Component Value Date    BUN 15 09/15/2017    CREATININE 1.0 09/15/2017     09/15/2017    K 4.1 09/15/2017     PT/INR:   Lab Results   Component Value Date    INR 1.22 09/14/2017       IMPRESSION and RECOMMENDATIONS:      Anxiety  See psychologist for counseling and for follow up    Hypertension  Well controlled on current medications, reviewed individually with patient. DM type 2 (diabetes mellitus, type 2) ([facility]  Follow up with PCP    Counseled for stress management and exercise for primary prevention by risk modification and lifestyle changes. Continue current cardiovascular medications which have been reviewed and discussed individually with you. Appropriate prescriptions if needed on this visit are addressed. After visit summery is provided. Questions answered and patient verbalizes understanding. Follow up in office in 12 months or as needed. Renee Sunshine MD, 10/30/2017 10:12 AM     Please note this report has been produced using speech recognition software and may contain errors related to that system including errors in grammar, punctuation, and spelling, as well as words and phrases that may be inappropriate.  If there are any questions or concerns please feel free to contact the dictating provider for clarification

## 2017-10-30 NOTE — PATIENT INSTRUCTIONS
Counseled for stress management and exercise for primary prevention by risk modification and lifestyle changes. Continue current cardiovascular medications which have been reviewed and discussed individually with you. Appropriate prescriptions if needed on this visit are addressed. After visit summery is provided. Questions answered and patient verbalizes understanding. Follow up in office in 12 months or as needed.

## 2018-01-25 ENCOUNTER — HOSPITAL ENCOUNTER (OUTPATIENT)
Dept: LAB | Age: 58
Discharge: HOME | End: 2018-01-25
Attending: NURSE PRACTITIONER | Admitting: NURSE PRACTITIONER

## 2018-01-25 LAB
ALT SERPL-CCNC: 14 U/L (ref 10–40)
ANION GAP SERPL CALCULATED.3IONS-SCNC: 9 MMOL/L (ref 4–16)
BASOPHILS ABSOLUTE: 0.1 K/CU MM
BASOPHILS RELATIVE PERCENT: 1.6 % (ref 0–1)
BUN BLDV-MCNC: 22 MG/DL (ref 6–23)
CALCIUM SERPL-MCNC: 9.5 MG/DL (ref 8.3–10.6)
CHLORIDE BLD-SCNC: 104 MMOL/L (ref 99–110)
CHOLESTEROL, FASTING: 188 MG/DL
CO2: 32 MMOL/L (ref 21–32)
CREAT SERPL-MCNC: 1.2 MG/DL (ref 0.9–1.3)
DIFFERENTIAL TYPE: ABNORMAL
EOSINOPHILS ABSOLUTE: 0.3 K/CU MM
EOSINOPHILS RELATIVE PERCENT: 4.9 % (ref 0–3)
ESTIMATED AVERAGE GLUCOSE: 111 MG/DL
GFR AFRICAN AMERICAN: >60 ML/MIN/1.73M2
GFR NON-AFRICAN AMERICAN: >60 ML/MIN/1.73M2
GLUCOSE FASTING: 110 MG/DL (ref 70–99)
HBA1C MFR BLD: 5.5 % (ref 4.2–6.3)
HCT VFR BLD CALC: 50.7 % (ref 42–52)
HDLC SERPL-MCNC: 44 MG/DL
HEMOGLOBIN: 16.9 GM/DL (ref 13.5–18)
IMMATURE NEUTROPHIL %: 0.2 % (ref 0–0.43)
LDL CHOLESTEROL DIRECT: 126 MG/DL
LYMPHOCYTES ABSOLUTE: 1.8 K/CU MM
LYMPHOCYTES RELATIVE PERCENT: 31 % (ref 24–44)
MCH RBC QN AUTO: 30.6 PG (ref 27–31)
MCHC RBC AUTO-ENTMCNC: 33.3 % (ref 32–36)
MCV RBC AUTO: 91.8 FL (ref 78–100)
MONOCYTES ABSOLUTE: 0.5 K/CU MM
MONOCYTES RELATIVE PERCENT: 9.1 % (ref 0–4)
PDW BLD-RTO: 12.9 % (ref 11.7–14.9)
PLATELET # BLD: 170 K/CU MM (ref 140–440)
PMV BLD AUTO: 9.5 FL (ref 7.5–11.1)
POTASSIUM SERPL-SCNC: 4.3 MMOL/L (ref 3.5–5.1)
PROSTATE SPECIFIC ANTIGEN: 0.85 NG/ML (ref 0–4)
RBC # BLD: 5.52 M/CU MM (ref 4.6–6.2)
SEGMENTED NEUTROPHILS ABSOLUTE COUNT: 3 K/CU MM
SEGMENTED NEUTROPHILS RELATIVE PERCENT: 53.2 % (ref 36–66)
SODIUM BLD-SCNC: 145 MMOL/L (ref 135–145)
TOTAL IMMATURE NEUTOROPHIL: 0.01 K/CU MM
TRIGLYCERIDE, FASTING: 94 MG/DL
TSH HIGH SENSITIVITY: 1.37 UIU/ML (ref 0.27–4.2)
WBC # BLD: 5.7 K/CU MM (ref 4–10.5)

## 2018-02-13 ENCOUNTER — TELEPHONE (OUTPATIENT)
Dept: CARDIOLOGY CLINIC | Age: 58
End: 2018-02-13

## 2018-02-15 ENCOUNTER — OFFICE VISIT (OUTPATIENT)
Dept: SURGERY | Age: 58
End: 2018-02-15

## 2018-02-15 VITALS
HEART RATE: 90 BPM | DIASTOLIC BLOOD PRESSURE: 84 MMHG | HEIGHT: 65 IN | SYSTOLIC BLOOD PRESSURE: 130 MMHG | WEIGHT: 171.3 LBS | BODY MASS INDEX: 28.54 KG/M2

## 2018-02-15 DIAGNOSIS — K21.00 GASTROESOPHAGEAL REFLUX DISEASE WITH ESOPHAGITIS: ICD-10-CM

## 2018-02-15 DIAGNOSIS — R10.13 EPIGASTRIC PAIN: Primary | ICD-10-CM

## 2018-02-15 PROCEDURE — G8427 DOCREV CUR MEDS BY ELIG CLIN: HCPCS | Performed by: SURGERY

## 2018-02-15 PROCEDURE — G8417 CALC BMI ABV UP PARAM F/U: HCPCS | Performed by: SURGERY

## 2018-02-15 PROCEDURE — G8484 FLU IMMUNIZE NO ADMIN: HCPCS | Performed by: SURGERY

## 2018-02-15 PROCEDURE — 99214 OFFICE O/P EST MOD 30 MIN: CPT | Performed by: SURGERY

## 2018-02-15 PROCEDURE — 4004F PT TOBACCO SCREEN RCVD TLK: CPT | Performed by: SURGERY

## 2018-02-15 PROCEDURE — 3017F COLORECTAL CA SCREEN DOC REV: CPT | Performed by: SURGERY

## 2018-02-15 RX ORDER — POLYETHYLENE GLYCOL 3350 17 G/17G
17 POWDER, FOR SOLUTION ORAL PRN
COMMUNITY
End: 2018-08-20

## 2018-02-15 RX ORDER — SUCRALFATE 1 G/1
1 TABLET ORAL 4 TIMES DAILY PRN
COMMUNITY
End: 2018-08-20

## 2018-02-15 NOTE — PROGRESS NOTES
SUBJECTIVE:  HPI:  Patient complains of abdominal pain. Pain is located in the epigastric with no radiation. The pain is described as aching and pressure-like. Onset was 1 year ago. Symptoms have been unchanged since. Aggravating factors: none. Associated symptoms: breathing difficulties. The patient denies constipation and diarrhea. Patient was seen by Dr. Irving Dumont in the past and had workup. I reviewed her notes. Patient had not been able to be off the antireflux medications to have a reliable Bravo testing and therefore it was not performed. May of 2017 Dr. Shereen Xiao - EGD/Colonoscopy. Thoroughly reviewed the patient's medical history, family history, social history and review of systems with the patient today in the office. Please see medical record for pertinent positives. Past Medical History:   Diagnosis Date    Acid reflux     Anesthesia     Nausea/Vomiting Post Op In Past    Anxiety     \"Severe Anxiety Problems\"    Arthritis     Back pain     \"All The Time\"    Depression     Diabetes mellitus ([facility] Dx 2000's    DM type 2 (diabetes mellitus, type 2) ([facility] 11/17/2015    Fluttering heart     NO CARDIOLOGIST NOW    H/O cardiovascular stress test 8/08, 12/3/13    12/3/13 result:  Normal perfusion in the distribution of all coronaries, normal LV size and function, EF 61%.  H/O echocardiogram 9/15/17,4/15/08    Mild concentric LVH with normal systolic function. EF 50-55% Trace TR  No evidence of pericardial effusion.  Hernia     History of cardiac monitoring 09/18/2017    Conclusion: Findings suggesting normal sinus rhythm with physiologic heart rate variations and symptoms of palpitations during normal rate and rhythm and during mild degree of sinus tachycardia. [facility] OTHER MEDICAL     Primary Care Physician Is Dr. Ana Garcia.  Bessy But  Sees Iris Lopez Nurse Practioner In [facility]    Hyperlipidemia     Hypertension     follows with Dr Verner Sour Liver disease     Nausea zolpidem (AMBIEN) 10 MG tablet Take by mouth nightly as needed for Sleep      cetirizine (ZYRTEC) 10 MG tablet Take 10 mg by mouth daily       fenofibrate (TRICOR) 54 MG tablet Take 54 mg by mouth daily. [facility] pharmacy: Please dispense generic fenofibrate unless prescriber denote       No current facility-administered medications for this visit. Allergies   Allergen Reactions    Aspirin Hives and Swelling    Bactrim [Sulfamethoxazole-Trimethoprim] Shortness Of Breath, Itching and Rash    Cefuroxime Axetil Swelling     \"Swelling Of Lips And Tongue\"    Ciprofloxacin Swelling    Clarithromycin Swelling    Doxycycline Hyclate Anaphylaxis    Motrin [Ibuprofen] Other (See Comments)     \"My Chest Hurts\"    Bupropion Swelling    Cefdinir Swelling    Eliquis [Apixaban]      Numbness in fingers and toes    Benadryl [Diphenhydramine] Anxiety    Prilosec [Omeprazole]      \"Allergic To Over The Counter Prilosec Causing More Stomach Pain\"  Pt states that he is allergic to the brand name.  Vistaril [Hydroxyzine Hcl] Anxiety       Review of Systems:       Review of Systems   Constitutional: Negative for chills and fever. HENT: Negative for ear pain, mouth sores, sore throat and tinnitus. Eyes: Negative for photophobia, redness and itching. Respiratory: Negative for apnea, choking and stridor. Cardiovascular: Negative for chest pain and palpitations. Gastrointestinal: Positive for abdominal pain. Negative for anal bleeding, constipation and rectal pain. Endocrine: Negative for polydipsia. Genitourinary: Negative for enuresis, flank pain and hematuria. Musculoskeletal: Negative for back pain, joint swelling and myalgias. Skin: Negative for color change and pallor. Allergic/Immunologic: Negative for environmental allergies. Neurological: Negative for syncope and speech difficulty. Psychiatric/Behavioral: Negative for confusion and hallucinations.        OBJECTIVE:  Physical Exam:    BP

## 2018-02-16 ENCOUNTER — TELEPHONE (OUTPATIENT)
Dept: SURGERY | Age: 58
End: 2018-02-16

## 2018-02-17 ASSESSMENT — ENCOUNTER SYMPTOMS
ABDOMINAL PAIN: 1
CONSTIPATION: 0
EYE ITCHING: 0
STRIDOR: 0
SORE THROAT: 0
COLOR CHANGE: 0
PHOTOPHOBIA: 0
CHOKING: 0
APNEA: 0
BACK PAIN: 0
EYE REDNESS: 0
ANAL BLEEDING: 0
RECTAL PAIN: 0

## 2018-03-16 ENCOUNTER — HOSPITAL ENCOUNTER (OUTPATIENT)
Dept: GENERAL RADIOLOGY | Age: 58
Discharge: HOME | End: 2018-03-16
Attending: SURGERY | Admitting: SURGERY

## 2018-03-16 DIAGNOSIS — R10.13 ABDOMINAL PAIN, EPIGASTRIC: ICD-10-CM

## 2018-03-16 DIAGNOSIS — R10.13 EPIGASTRIC PAIN: ICD-10-CM

## 2018-03-22 ENCOUNTER — OFFICE VISIT (OUTPATIENT)
Dept: SURGERY | Age: 58
End: 2018-03-22

## 2018-03-22 VITALS
DIASTOLIC BLOOD PRESSURE: 68 MMHG | BODY MASS INDEX: 28.54 KG/M2 | WEIGHT: 171.3 LBS | SYSTOLIC BLOOD PRESSURE: 102 MMHG | HEIGHT: 65 IN | HEART RATE: 85 BPM

## 2018-03-22 DIAGNOSIS — K21.00 GERD WITH ESOPHAGITIS: Primary | ICD-10-CM

## 2018-03-22 PROCEDURE — 99212 OFFICE O/P EST SF 10 MIN: CPT | Performed by: PHYSICIAN ASSISTANT

## 2018-03-22 PROCEDURE — G8417 CALC BMI ABV UP PARAM F/U: HCPCS | Performed by: SURGERY

## 2018-03-22 PROCEDURE — 3017F COLORECTAL CA SCREEN DOC REV: CPT | Performed by: SURGERY

## 2018-03-22 PROCEDURE — G8427 DOCREV CUR MEDS BY ELIG CLIN: HCPCS | Performed by: SURGERY

## 2018-03-22 PROCEDURE — G8484 FLU IMMUNIZE NO ADMIN: HCPCS | Performed by: SURGERY

## 2018-03-22 PROCEDURE — 4004F PT TOBACCO SCREEN RCVD TLK: CPT | Performed by: SURGERY

## 2018-03-22 ASSESSMENT — ENCOUNTER SYMPTOMS
CHEST TIGHTNESS: 0
ALLERGIC/IMMUNOLOGIC NEGATIVE: 1
ABDOMINAL DISTENTION: 0
ANAL BLEEDING: 0
EYE ITCHING: 0
ABDOMINAL PAIN: 1
EYE DISCHARGE: 0
EYE PAIN: 0
APNEA: 0
BACK PAIN: 0
CHOKING: 0

## 2018-03-22 NOTE — PROGRESS NOTES
current facility-administered medications for this visit. Allergies   Allergen Reactions    Aspirin Hives and Swelling    Bactrim [Sulfamethoxazole-Trimethoprim] Shortness Of Breath, Itching and Rash    Cefuroxime Axetil Swelling     \"Swelling Of Lips And Tongue\"    Ciprofloxacin Swelling    Clarithromycin Swelling    Doxycycline Hyclate Anaphylaxis    Motrin [Ibuprofen] Other (See Comments)     \"My Chest Hurts\"    Bupropion Swelling    Cefdinir Swelling    Eliquis [Apixaban]      Numbness in fingers and toes    Benadryl [Diphenhydramine] Anxiety    Prilosec [Omeprazole]      \"Allergic To Over The Counter Prilosec Causing More Stomach Pain\"  Pt states that he is allergic to the brand name.  Vistaril [Hydroxyzine Hcl] Anxiety       Review of Systems:         Review of Systems   Constitutional: Positive for appetite change. Negative for activity change and chills. HENT: Negative for congestion, dental problem and drooling. Eyes: Negative for pain, discharge and itching. Respiratory: Negative for apnea, choking and chest tightness. Cardiovascular: Negative. Gastrointestinal: Positive for abdominal pain. Negative for abdominal distention and anal bleeding. Endocrine: Negative for cold intolerance, heat intolerance and polydipsia. Genitourinary: Negative for difficulty urinating, dysuria and enuresis. Musculoskeletal: Negative for arthralgias, back pain and joint swelling. Skin: Negative. Allergic/Immunologic: Negative. Neurological: Negative for dizziness, facial asymmetry and headaches. Hematological: Negative. Psychiatric/Behavioral: Negative for agitation, behavioral problems and confusion. OBJECTIVE:  Physical Exam:    /68 (Site: Left Arm, Position: Sitting, Cuff Size: Medium Adult)   Pulse 85   Ht 5' 5\" (1.651 m)   Wt 171 lb 4.8 oz (77.7 kg)   BMI 28.51 kg/m²      Physical Exam   Constitutional: He is oriented to person, place, and time.  He appears

## 2018-05-18 ENCOUNTER — OFFICE VISIT (OUTPATIENT)
Dept: ORTHOPEDIC SURGERY | Age: 58
End: 2018-05-18

## 2018-05-18 VITALS — RESPIRATION RATE: 16 BRPM | BODY MASS INDEX: 28.49 KG/M2 | HEIGHT: 65 IN | WEIGHT: 171 LBS

## 2018-05-18 DIAGNOSIS — M16.31 OSTEOARTHRITIS RESULTING FROM RIGHT HIP DYSPLASIA: Primary | ICD-10-CM

## 2018-05-18 PROCEDURE — 4004F PT TOBACCO SCREEN RCVD TLK: CPT | Performed by: ORTHOPAEDIC SURGERY

## 2018-05-18 PROCEDURE — G8417 CALC BMI ABV UP PARAM F/U: HCPCS | Performed by: ORTHOPAEDIC SURGERY

## 2018-05-18 PROCEDURE — 3017F COLORECTAL CA SCREEN DOC REV: CPT | Performed by: ORTHOPAEDIC SURGERY

## 2018-05-18 PROCEDURE — 99213 OFFICE O/P EST LOW 20 MIN: CPT | Performed by: ORTHOPAEDIC SURGERY

## 2018-05-18 PROCEDURE — G8427 DOCREV CUR MEDS BY ELIG CLIN: HCPCS | Performed by: ORTHOPAEDIC SURGERY

## 2018-05-18 ASSESSMENT — ENCOUNTER SYMPTOMS
BACK PAIN: 1
RESPIRATORY NEGATIVE: 1
HEARTBURN: 1
EYES NEGATIVE: 1
CONSTIPATION: 1
NAUSEA: 1
ABDOMINAL PAIN: 1

## 2018-06-12 ENCOUNTER — TELEPHONE (OUTPATIENT)
Dept: ORTHOPEDIC SURGERY | Age: 58
End: 2018-06-12

## 2018-06-29 ENCOUNTER — TELEPHONE (OUTPATIENT)
Dept: SURGERY | Age: 58
End: 2018-06-29

## 2018-07-05 ENCOUNTER — TELEPHONE (OUTPATIENT)
Dept: SURGERY | Age: 58
End: 2018-07-05

## 2018-07-05 NOTE — TELEPHONE ENCOUNTER
Called and left a  for Domenico Paniagua  To schedule consultation appt  For Hiatal Hernia,  Referral from Dr. Britany Shaver.

## 2018-07-17 ENCOUNTER — TELEPHONE (OUTPATIENT)
Dept: SURGERY | Age: 58
End: 2018-07-17

## 2018-07-26 ENCOUNTER — OFFICE VISIT (OUTPATIENT)
Dept: SURGERY | Age: 58
End: 2018-07-26

## 2018-07-26 VITALS
HEART RATE: 100 BPM | SYSTOLIC BLOOD PRESSURE: 104 MMHG | BODY MASS INDEX: 29.66 KG/M2 | DIASTOLIC BLOOD PRESSURE: 72 MMHG | HEIGHT: 65 IN | OXYGEN SATURATION: 98 % | WEIGHT: 178 LBS

## 2018-07-26 DIAGNOSIS — K21.00 GERD WITH ESOPHAGITIS: Primary | ICD-10-CM

## 2018-07-26 PROCEDURE — G8427 DOCREV CUR MEDS BY ELIG CLIN: HCPCS | Performed by: SURGERY

## 2018-07-26 PROCEDURE — 3017F COLORECTAL CA SCREEN DOC REV: CPT | Performed by: SURGERY

## 2018-07-26 PROCEDURE — 4004F PT TOBACCO SCREEN RCVD TLK: CPT | Performed by: SURGERY

## 2018-07-26 PROCEDURE — G8417 CALC BMI ABV UP PARAM F/U: HCPCS | Performed by: SURGERY

## 2018-07-26 PROCEDURE — 99214 OFFICE O/P EST MOD 30 MIN: CPT | Performed by: SURGERY

## 2018-07-26 RX ORDER — LATANOPROST 50 UG/ML
1 SOLUTION/ DROPS OPHTHALMIC NIGHTLY
COMMUNITY
End: 2019-03-18

## 2018-07-26 RX ORDER — GABAPENTIN 100 MG/1
100 CAPSULE ORAL
COMMUNITY
End: 2018-08-20

## 2018-08-01 ENCOUNTER — TELEPHONE (OUTPATIENT)
Dept: SURGERY | Age: 58
End: 2018-08-01

## 2018-08-01 NOTE — TELEPHONE ENCOUNTER
SPOKE TO JAKOB REGARDING SURGERY (ROBOTIC NISSEN FUND.) SCHEDULED @ [facility]. NOTIFIED OF DATES, TIMES AND LOCATION.     PAT - 8/20/18 @ 10  SURGERY - 8/31/18 @ 730  P/O - CALL AFTER DISCHARGE    LIQUIDS DIET START ON 8/21/18  NPO AFTER MIDNIGHT  PATIENT NOT ON BLOOD THINNERS   SENT

## 2018-08-05 ASSESSMENT — ENCOUNTER SYMPTOMS
CONSTIPATION: 0
APNEA: 0
SORE THROAT: 0
COLOR CHANGE: 0
PHOTOPHOBIA: 0
STRIDOR: 0
BACK PAIN: 0
RECTAL PAIN: 0
ANAL BLEEDING: 0
CHOKING: 0
EYE REDNESS: 0
EYE ITCHING: 0

## 2018-08-05 NOTE — PROGRESS NOTES
Chief Complaint   Patient presents with    Hiatal Hernia     f/u; reflux, N&V,  LUQ pain: 5         SUBJECTIVE:  HPI: Patient is here with complaints of Epigastric abdominal pain. He is status post esophagogastroduodenoscopy per Dr. Davon Kennedy. His cody study was abnormal with the amount of reflux. Patient would like to proceed with surgical intervention. I have reviewed the patient's(pertinent information to this visit) medical history, family history(scanned in  the Media tab under \"patient questioner\"), social history and review of systems with the patient today in the office. Past Surgical History:   Procedure Laterality Date    CHOLECYSTECTOMY  2/18/13    Laprascopic    COLONOSCOPY  1-13    One Polyp Removed    DENTAL SURGERY      Teeth Extracted In Past    ENDOSCOPY, COLON, DIAGNOSTIC  Last Done 1-13    FRACTURE SURGERY Left     Broken Left Arm, \"They Put Me Under To Reset It\"    GALLBLADDER SURGERY      HIP SURGERY Right 1978    OTHER SURGICAL HISTORY Right 12/13/2016    Right hip steroid injection    OTHER SURGICAL HISTORY      \"put me asleep for BRAVO test- inserted chip in my esophagus\"   [address] [place] [address]  2010    SINUS SURGERY  07/2014    UPPER GASTROINTESTINAL ENDOSCOPY       Past Medical History:   Diagnosis Date    Acid reflux     Anesthesia     Nausea/Vomiting Post Op In Past    Anxiety     \"Severe Anxiety Problems\"    Arthritis     Back pain     \"All The Time\"    Depression     Diabetes mellitus ([facility] Dx 2000's    DM type 2 (diabetes mellitus, type 2) ([facility] 11/17/2015    Fluttering heart     NO CARDIOLOGIST NOW    Glaucoma     H/O cardiovascular stress test 8/08, 12/3/13    12/3/13 result:  Normal perfusion in the distribution of all coronaries, normal LV size and function, EF 61%.  H/O echocardiogram 9/15/17,4/15/08    Mild concentric LVH with normal systolic function. EF 50-55% Trace TR  No evidence of pericardial effusion.      Hernia     History of cardiac N/A     Occupational History    unemployed      Social History Main Topics    Smoking status: Former Smoker     Packs/day: 1.00     Years: 4.00     Quit date: 2/15/1987    Smokeless tobacco: Current User     Types: Snuff      Comment: Takes snuff couple times daily.  Alcohol use No    Drug use: No    Sexual activity: Not Currently     Other Topics Concern    Not on file     Social History Narrative    Do you donate blood or plasma? No    Caffeine intake? None    Advance directive? No    Is blood transfusion acceptable in an emergency? yes       Current Outpatient Prescriptions   Medication Sig Dispense Refill    gabapentin (NEURONTIN) 100 MG capsule Take 100 mg by mouth 3 times daily. Brandy Saldaña latanoprost (XALATAN) 0.005 % ophthalmic solution 1 drop nightly      sucralfate (CARAFATE) 1 GM tablet Take 1 g by mouth 4 times daily as needed      polyethylene glycol (GLYCOLAX) powder Take 17 g by mouth as needed      glipiZIDE (GLUCOTROL) 10 MG tablet Take 10 mg by mouth 2 times daily (before meals)      Alum Hydroxide-Mag Carbonate (GAVISCON PO) Take by mouth      pantoprazole (PROTONIX) 40 MG tablet Take 40 mg by mouth daily      metoprolol tartrate (LOPRESSOR) 25 MG tablet Take 0.5 tablets by mouth 2 times daily 60 tablet 0    albuterol sulfate  (90 Base) MCG/ACT inhaler Inhale 2 puffs into the lungs every 6 hours as needed for Wheezing      famotidine (PEPCID) 20 MG tablet Take 1 tablet by mouth 2 times daily 60 tablet 3    dicyclomine (BENTYL) 10 MG capsule Take 1 capsule by mouth 3 times daily as needed (Abdominal pain) 15 capsule 3    LORazepam (ATIVAN) 1 MG tablet Take 1 mg by mouth every 8 hours as needed       zolpidem (AMBIEN) 10 MG tablet Take by mouth nightly as needed for Sleep      cetirizine (ZYRTEC) 10 MG tablet Take 10 mg by mouth daily       fenofibrate (TRICOR) 54 MG tablet Take 54 mg by mouth daily.  [facility]: Please dispense generic fenofibrate unless prescriber

## 2018-08-07 ENCOUNTER — TELEPHONE (OUTPATIENT)
Dept: CARDIOLOGY CLINIC | Age: 58
End: 2018-08-07

## 2018-08-07 NOTE — TELEPHONE ENCOUNTER
Received cardiac clearance request from Dr. Les Young  for hiatal hernia/robotic Nissen under general anesthesia. Date of surgery is 8/31/18. Dr. Jose Eduardo King reviewed the patient's chart and cleared patient to proceed with surgery. Cardiac risk assessment letter prepared and faxed to [phone] (may be seen under the \"Letters\" tab in EPIC). See cardiac risk assessment form attached.

## 2018-08-16 NOTE — ANESTHESIA PRE-OP
Pre-Admission Testing   Pre-Procedural Screening   NP Note    PMH:  Past Medical History:   Diagnosis Date    Acid reflux     Anesthesia     Nausea/Vomiting Post Op In Past    Anxiety     \"Severe Anxiety Problems\"    Arthritis     Back pain     \"All The Time\"    Depression     Diabetes mellitus ([facility] Dx 2000's    DM type 2 (diabetes mellitus, type 2) ([facility] 11/17/2015    Fluttering heart     NO CARDIOLOGIST NOW    Glaucoma     H/O cardiovascular stress test 8/08, 12/3/13    12/3/13 result:  Normal perfusion in the distribution of all coronaries, normal LV size and function, EF 61%.  H/O echocardiogram 9/15/17,4/15/08    Mild concentric LVH with normal systolic function. EF 50-55% Trace TR  No evidence of pericardial effusion.  Hernia     History of cardiac monitoring 09/18/2017    Conclusion: Findings suggesting normal sinus rhythm with physiologic heart rate variations and symptoms of palpitations during normal rate and rhythm and during mild degree of sinus tachycardia. [facility] OTHER MEDICAL     Primary Care Physician Is Dr. Joycelyn Leos.  Bessy But  Sees Vicki Fish Nurse Practioner In [facility]    Hyperlipidemia     Hypertension     follows with Dr Seble Owens Liver disease     Nausea & vomiting     Nausea/Vomiting Post Op In Past    Osteoarthritis     [facility]     follows with Dr Seble Owens Panic attacks     Pneumonia 1990's    \"Two Times\"    Prolonged emergence from general anesthesia     'trouble waking me up after gallbladders urgery but I have anxiety so bad\"    Shortness of breath     Sinusitis     Sleep apnea     \"had sleep study over 2 yrs ago- suppose to wear cpap but makes me choke\"    Tobacco abuse     Wears dentures     Partial Upper    Wears glasses        PSH:    Past Surgical History:   Procedure Laterality Date    CHOLECYSTECTOMY  2/18/13    Laprascopic    COLONOSCOPY  1-13    One Polyp Removed    DENTAL SURGERY      Teeth Extracted In Past    ENDOSCOPY, COLON, DIAGNOSTIC  Last Done 1-13    FRACTURE SURGERY Left     Broken Left Arm, \"They Put Me Under To Reset It\"    GALLBLADDER SURGERY      HIP SURGERY Right 1978    OTHER SURGICAL HISTORY Right 12/13/2016    Right hip steroid injection    OTHER SURGICAL HISTORY      \"put me asleep for BRAVO test- inserted chip in my esophagus\"    SINUS SURGERY  2010    SINUS SURGERY  07/2014    UPPER GASTROINTESTINAL ENDOSCOPY          Current Medications:   Not in a hospital admission. Allergies: Allergies   Allergen Reactions    Aspirin Hives and Swelling    Bactrim [Sulfamethoxazole-Trimethoprim] Shortness Of Breath, Itching and Rash    Cefuroxime Axetil Swelling     \"Swelling Of Lips And Tongue\"    Ciprofloxacin Swelling    Clarithromycin Swelling    Doxycycline Hyclate Anaphylaxis    Motrin [Ibuprofen] Other (See Comments)     \"My Chest Hurts\"    Bupropion Swelling    Cefdinir Swelling    Eliquis [Apixaban]      Numbness in fingers and toes    Sulfa Antibiotics     Benadryl [Diphenhydramine] Anxiety    Prilosec [Omeprazole]      \"Allergic To Over The Counter Prilosec Causing More Stomach Pain\"  Pt states that he is allergic to the brand name.  Vistaril [Hydroxyzine Hcl] Anxiety             Social History:  Social History     Social History    Marital status: Single     Spouse name: N/A    Number of children: N/A    Years of education: N/A     Occupational History    unemployed      Social History Main Topics    Smoking status: Former Smoker     Packs/day: 1.00     Years: 4.00     Quit date: 2/15/1987    Smokeless tobacco: Current User     Types: Snuff      Comment: Takes snuff couple times daily.  Alcohol use No    Drug use: No    Sexual activity: Not Currently     Other Topics Concern    Not on file     Social History Narrative    Do you donate blood or plasma? No    Caffeine intake? None    Advance directive?  No    Is blood transfusion acceptable in

## 2018-08-20 ENCOUNTER — HOSPITAL ENCOUNTER (OUTPATIENT)
Dept: PREADMISSION TESTING | Age: 58
Discharge: HOME | End: 2018-08-20
Attending: SURGERY | Admitting: SURGERY

## 2018-08-20 VITALS
HEIGHT: 65 IN | DIASTOLIC BLOOD PRESSURE: 80 MMHG | TEMPERATURE: 98.2 F | HEART RATE: 76 BPM | OXYGEN SATURATION: 95 % | SYSTOLIC BLOOD PRESSURE: 138 MMHG | RESPIRATION RATE: 16 BRPM | WEIGHT: 184 LBS | BODY MASS INDEX: 30.66 KG/M2

## 2018-08-20 LAB
ANION GAP SERPL CALCULATED.3IONS-SCNC: 13 MMOL/L (ref 4–16)
BUN BLDV-MCNC: 22 MG/DL (ref 6–23)
CALCIUM SERPL-MCNC: 9.2 MG/DL (ref 8.3–10.6)
CHLORIDE BLD-SCNC: 106 MMOL/L (ref 99–110)
CO2: 22 MMOL/L (ref 21–32)
CREAT SERPL-MCNC: 1 MG/DL (ref 0.9–1.3)
GFR AFRICAN AMERICAN: >60 ML/MIN/1.73M2
GFR NON-AFRICAN AMERICAN: >60 ML/MIN/1.73M2
GLUCOSE BLD-MCNC: 212 MG/DL (ref 70–99)
HCT VFR BLD CALC: 50.9 % (ref 42–52)
HEMOGLOBIN: 16.6 GM/DL (ref 13.5–18)
MCH RBC QN AUTO: 31 PG (ref 27–31)
MCHC RBC AUTO-ENTMCNC: 32.6 % (ref 32–36)
MCV RBC AUTO: 95.1 FL (ref 78–100)
PDW BLD-RTO: 12.9 % (ref 11.7–14.9)
PLATELET # BLD: 184 K/CU MM (ref 140–440)
PMV BLD AUTO: 9.3 FL (ref 7.5–11.1)
POTASSIUM SERPL-SCNC: 4.2 MMOL/L (ref 3.5–5.1)
RBC # BLD: 5.35 M/CU MM (ref 4.6–6.2)
SODIUM BLD-SCNC: 141 MMOL/L (ref 135–145)
WBC # BLD: 5.7 K/CU MM (ref 4–10.5)

## 2018-08-20 RX ORDER — FLUTICASONE PROPIONATE 50 MCG
SPRAY, SUSPENSION (ML) NASAL PRN
COMMUNITY

## 2018-08-20 NOTE — PRE-PROCEDURE INSTRUCTIONS
Pre-op instruction sheet reviewed with patient with understanding voiced. Pt understands to take Pantoprazole 40mg and Ativan 1mg on the morning of surgery with a sip of watermg only. Pt given both Hibiclens (CHG) solution and Aloe Vesta body wash and instructed for usage preoperatively with understanding voiced. Pt understands to follow all other instructions given by surgeon's office.

## 2018-08-21 LAB
EKG ATRIAL RATE: 70 BPM
EKG DIAGNOSIS: NORMAL
EKG P AXIS: 19 DEGREES
EKG P-R INTERVAL: 146 MS
EKG Q-T INTERVAL: 376 MS
EKG QRS DURATION: 86 MS
EKG QTC CALCULATION (BAZETT): 406 MS
EKG R AXIS: 12 DEGREES
EKG T AXIS: 3 DEGREES
EKG VENTRICULAR RATE: 70 BPM

## 2018-08-24 ENCOUNTER — TELEPHONE (OUTPATIENT)
Dept: SURGERY | Age: 58
End: 2018-08-24

## 2018-08-24 NOTE — TELEPHONE ENCOUNTER
Patient phoned and is scheduled for surgery (Nissen) on 8/31/18 and on the 10 day diet. Alvni Bennett states he's having a difficult time with the liquid diet and added that his blood sugar was as low as 48 \"the other evening\" and he ate something small. Admitting he feels badly for eating, he wanted to be honest and should he reschedule his surgery due to eating? Please return patients call at [phone].

## 2018-09-05 ENCOUNTER — TELEPHONE (OUTPATIENT)
Dept: SURGERY | Age: 58
End: 2018-09-05

## 2018-09-05 NOTE — TELEPHONE ENCOUNTER
LEFT A MESSAGE FOR JAKOB REGARDING RESCHEDULING HIS SURGERY ( ROBOTIC NISSEN)     PHONE ASSESSMENT  SURGERY - 9/21/18 @ 10  P/O - CALL AFTER DISCHARGE    LIQUID DIET FOR 7 DAYS - STARTING 9/14/18  NPO AFTER MIDNIGHT  HOLD BLOOD THINNERS 5 DAYS PRIOR   SENT

## 2018-09-06 ENCOUNTER — TELEPHONE (OUTPATIENT)
Dept: SURGERY | Age: 58
End: 2018-09-06

## 2018-09-25 ENCOUNTER — HOSPITAL ENCOUNTER (EMERGENCY)
Age: 58
Discharge: HOME | End: 2018-09-25
Attending: EMERGENCY MEDICINE
Payer: MEDICAID

## 2018-09-25 VITALS
HEIGHT: 65 IN | WEIGHT: 179 LBS | TEMPERATURE: 98.7 F | SYSTOLIC BLOOD PRESSURE: 173 MMHG | OXYGEN SATURATION: 96 % | DIASTOLIC BLOOD PRESSURE: 93 MMHG | HEART RATE: 62 BPM | RESPIRATION RATE: 18 BRPM | BODY MASS INDEX: 29.82 KG/M2

## 2018-09-25 DIAGNOSIS — K02.9 DENTAL CARIES: ICD-10-CM

## 2018-09-25 DIAGNOSIS — K08.89 ODONTALGIA: Primary | ICD-10-CM

## 2018-09-25 PROCEDURE — 99282 EMERGENCY DEPT VISIT SF MDM: CPT

## 2018-09-25 RX ORDER — PENICILLIN V POTASSIUM 500 MG/1
500 TABLET ORAL 4 TIMES DAILY
Qty: 28 TABLET | Refills: 0 | Status: SHIPPED | OUTPATIENT
Start: 2018-09-25 | End: 2018-10-02

## 2018-09-25 ASSESSMENT — PAIN SCALES - GENERAL: PAINLEVEL_OUTOF10: 10

## 2018-09-25 ASSESSMENT — PAIN DESCRIPTION - DESCRIPTORS: DESCRIPTORS: THROBBING;SHARP

## 2018-09-25 ASSESSMENT — PAIN DESCRIPTION - LOCATION: LOCATION: TEETH

## 2018-09-25 ASSESSMENT — PAIN DESCRIPTION - ORIENTATION: ORIENTATION: ANTERIOR;LOWER

## 2018-09-25 NOTE — ED PROVIDER NOTES
Triage Chief Complaint:   Dental Pain (pt states he grinds his teeth in his sleep, front tooth broke off approx 1100 pm and has been painful, took Tylenol and Oragel with no relief)    [place]:  Waldo Mendez is a 66 y.o. male that presents to the ED by himself complaining of tooth pain. He does not have a dentist he tells me he does have insurance but is deficits will not see him. He is unable to chew body or drink cold fluids. He's been eating and was times in the past for similar complaints. His been noncompliant in follow-up. Past Medical History:   Diagnosis Date    Anxiety     \"Severe Anxiety Problems\"    Arthritis     hands-back    Back pain     \"All The Time\"    Depression     DM type 2 (diabetes mellitus, type 2) ([facility] 11/17/2015    no longer taking any medication    Environmental allergies     GERD (gastroesophageal reflux disease)     Glaucoma     H/O cardiovascular stress test 8/08, 12/3/13    12/3/13 result:  Normal perfusion in the distribution of all coronaries, normal LV size and function, EF 61%.  H/O echocardiogram 9/15/17,4/15/08    Mild concentric LVH with normal systolic function. EF 50-55% Trace TR  No evidence of pericardial effusion.  Hiatal hernia     History of cardiac monitoring 09/18/2017    Conclusion: Findings suggesting normal sinus rhythm with physiologic heart rate variations and symptoms of palpitations during normal rate and rhythm and during mild degree of sinus tachycardia.      Hyperlipidemia     Hypertension     follows with Dr Chester Sood Liver disease     Missing teeth, acquired     states \"all my teeth are rotting away\"    Osteoarthritis     Rt hip    PAF (paroxysmal atrial fibrillation) ([facility]     follows with Dr Chester Sood Panic attacks     Pneumonia 1990's    \"Two Times\"    PONV (postoperative nausea and vomiting)     Prolonged emergence from general anesthesia     'trouble waking me up after gallbladders surgery but I have anxiety so bad\"    Years: 4.00     Quit date: 2/15/1987    Smokeless tobacco: Current User     Types: Snuff      Comment: Takes snuff couple times daily.  Alcohol use No    Drug use: No    Sexual activity: Not Currently     Other Topics Concern    Not on file     Social History Narrative    Do you donate blood or plasma? No    Caffeine intake? None    Advance directive? No    Is blood transfusion acceptable in an emergency? yes     No current facility-administered medications for this encounter. Current Outpatient Prescriptions   Medication Sig Dispense Refill    penicillin v potassium (VEETID) 500 MG tablet Take 1 tablet by mouth 4 times daily for 7 days 28 tablet 0    fluticasone (FLONASE) 50 MCG/ACT nasal spray 1 spray by Each Nare route daily as needed for Rhinitis      latanoprost (XALATAN) 0.005 % ophthalmic solution Place 1 drop into both eyes nightly       pantoprazole (PROTONIX) 40 MG tablet Take 40 mg by mouth daily      albuterol sulfate  (90 Base) MCG/ACT inhaler Inhale 2 puffs into the lungs every 6 hours as needed for Wheezing      famotidine (PEPCID) 20 MG tablet Take 1 tablet by mouth 2 times daily (Patient taking differently: Take 20 mg by mouth nightly ) 60 tablet 3    dicyclomine (BENTYL) 10 MG capsule Take 1 capsule by mouth 3 times daily as needed (Abdominal pain) (Patient taking differently: Take 10 mg by mouth every morning ) 15 capsule 3    LORazepam (ATIVAN) 1 MG tablet Take 1 mg by mouth 3 times daily. Orlin Eusebio zolpidem (AMBIEN) 10 MG tablet Take 10 mg by mouth nightly as needed for Sleep. Orlin Bolk cetirizine (ZYRTEC) 10 MG tablet Take 10 mg by mouth daily       fenofibrate (TRICOR) 54 MG tablet Take 54 mg by mouth daily.  s [facility] pharmacy: Please dispense generic fenofibrate unless prescriber denote       Allergies   Allergen Reactions    Aspirin Hives and Swelling    Bactrim [Sulfamethoxazole-Trimethoprim] Shortness Of Breath, Itching and Rash    Cefuroxime Axetil Swelling

## 2018-10-05 ENCOUNTER — TELEPHONE (OUTPATIENT)
Dept: SURGERY | Age: 58
End: 2018-10-05

## 2018-10-24 ENCOUNTER — ANESTHESIA EVENT (OUTPATIENT)
Dept: OPERATING ROOM | Age: 58
DRG: 232 | End: 2018-10-24
Payer: MEDICAID

## 2018-10-24 NOTE — ANESTHESIA PRE PROCEDURE
Rhinitis      latanoprost (XALATAN) 0.005 % ophthalmic solution Place 1 drop into both eyes nightly       pantoprazole (PROTONIX) 40 MG tablet Take 40 mg by mouth daily      albuterol sulfate  (90 Base) MCG/ACT inhaler Inhale 2 puffs into the lungs every 6 hours as needed for Wheezing      famotidine (PEPCID) 20 MG tablet Take 1 tablet by mouth 2 times daily (Patient taking differently: Take 20 mg by mouth nightly ) 60 tablet 3    dicyclomine (BENTYL) 10 MG capsule Take 1 capsule by mouth 3 times daily as needed (Abdominal pain) (Patient taking differently: Take 10 mg by mouth every morning ) 15 capsule 3    LORazepam (ATIVAN) 1 MG tablet Take 1 mg by mouth 3 times daily. Lauravvinicio Mckinnon zolpidem (AMBIEN) 10 MG tablet Take 10 mg by mouth nightly as needed for Sleep. Lauravvinicio Mckinnon cetirizine (ZYRTEC) 10 MG tablet Take 10 mg by mouth daily       fenofibrate (TRICOR) 54 MG tablet Take 54 mg by mouth daily. [facility] pharmacy: Please dispense generic fenofibrate unless prescriber denote       No current facility-administered medications for this encounter. Allergies:     Allergies   Allergen Reactions    Aspirin Hives and Swelling    Bactrim [Sulfamethoxazole-Trimethoprim] Shortness Of Breath, Itching and Rash    Benadryl [Diphenhydramine] Anxiety    Bupropion Swelling    Cefdinir Swelling    Cefuroxime Axetil Swelling     \"Swelling Of Lips And Tongue\"    Ciprofloxacin Swelling    Clarithromycin Swelling    Doxycycline Hyclate Anaphylaxis    Eliquis [Apixaban]      \"Numbness In Fingers And Toes\"    Motrin [Ibuprofen]      \"My Chest Hurts\"    Prilosec [Omeprazole]      \"Stomach Pain\"    Sulfa Antibiotics Shortness Of Breath, Itching and Rash    Vistaril [Hydroxyzine Hcl] Anxiety       Problem List:    Patient Active Problem List   Diagnosis Code    Hyperlipidemia E78.5    Anxiety F41.9    Hypertension I10    PAF (paroxysmal atrial fibrillation) (HCC) I48.0    Palpitations R00.2    Precordial pain 'trouble waking me up after gallbladders surgery but I have anxiety so bad\"    Shortness of breath     Sinusitis     Sleep apnea     \"had sleep study over 2 yrs ago- suppose to wear cpap but makes me choke\"    Tobacco abuse     Wears dentures     Partial Upper--doesn't wear it    Wears glasses        Past Surgical History:        Procedure Laterality Date    CHOLECYSTECTOMY  2/18/13    Laprascopic    COLONOSCOPY  06/2018    One Polyp Removed    DENTAL SURGERY      Teeth Extracted In Past    ENDOSCOPY, COLON, DIAGNOSTIC  Last Done 1-13    FRACTURE SURGERY Left     Broken Left Arm, \"They Put Me Under To Reset It\"    HIP SURGERY Right 1978  and [address] Right 12/13/2016    Right hip steroid injection    OTHER SURGICAL HISTORY      \"put me asleep for BRAVO test- inserted chip in my esophagus\"   [address] [place] [address]  2010    SINUS SURGERY  07/2014    UPPER GASTROINTESTINAL ENDOSCOPY         Social History:    Social History   Substance Use Topics    Smoking status: Former Smoker     Packs/day: 0.50     Years: 8.00     Types: Cigarettes, Cigars     Start date: 1979     Quit date: 1987    Smokeless tobacco: Current User     Types: Snuff    Alcohol use No                                Ready to quit: Not Answered  Counseling given: Not Answered      Vital Signs (Current): There were no vitals filed for this visit. BP Readings from Last 3 Encounters:   10/26/18 121/80   09/25/18 (!) 173/93   08/20/18 138/80       NPO Status:                                                                                 BMI:   Wt Readings from Last 3 Encounters:   10/26/18 188 lb 6 oz (85.4 kg)   09/25/18 179 lb (81.2 kg)   08/20/18 184 lb (83.5 kg)     There is no height or weight on file to calculate BMI.         CBC  Lab Results   Component Value Date/Time    WBC 5.6 10/26/2018 09:35 AM    HGB 16.9 10/26/2018 09:35 AM    HCT 49.9 10/26/2018 09:35 AM    PLT 177 10/26/2018 09:35 AM     RENAL  Lab Results   Component Value Date/Time     10/26/2018 09:35 AM    K 4.0 10/26/2018 09:35 AM     10/26/2018 09:35 AM    CO2 23 10/26/2018 09:35 AM    BUN 23 10/26/2018 09:35 AM    CREATININE 1.0 10/26/2018 09:35 AM    GLUCOSE 121 (H) 10/26/2018 09:35 AM     Anesthesia Evaluation  Patient summary reviewed and Nursing notes reviewed   history of anesthetic complications (Slow to wake, Severe anxiety, benzo dependency): PONV. Airway:         Dental:          Pulmonary:   (+) pneumonia ():  shortness of breath (with anxiety ):  sleep apnea: on noncompliant,                            ROS comment: Remote smoker 1ppd x 4 years, quit . Daily chews tobacco.   Counseled on smoking cessation. unable to lie flat. Cardiovascular:    (+) hypertension (on beta blocker):, dysrhythmias (PAF):,       ECG reviewed      Echocardiogram reviewed  Stress test reviewed  Cleared by cardiology           ROS comment:  Cardiac risk assessment per Dr. Arina Tao. Considered a low risk candidate for any deloris-operative cardiac complications    Echo 3247  EF 50-55%   Mild concentric LVH with normal systolic function.    Trace tricuspid regurgitation .   No evidence of pericardial effusion.   No significant valvular abnormalities.     Stress  2017   No infarct or ischemia noted.   There is normal isotope uptake following Lexiscan infusion and at rest.   Normal Perfusion.   ECG portion of stress test is positive for ischemia by diagnostic criteria   but imaging portion shows no perfusion abnormality essentially low risk ,   normal stress test   Supervising physician Dr. Arina Tao .     [id number]   NSR    Denies CP or SOB. Walks one mile, 3 x per week      Neuro/Psych:   (+) neuromuscular disease:, psychiatric history ( panic attacks. Hx benzo dependency ):depression/anxiety             GI/Hepatic/Renal:   (+) hiatal hernia, GERD:, liver disease:,          ROS comment:  s/p pari, 2013.

## 2018-10-26 ENCOUNTER — HOSPITAL ENCOUNTER (OUTPATIENT)
Dept: PREADMISSION TESTING | Age: 58
Discharge: HOME | End: 2018-10-30
Payer: MEDICAID

## 2018-10-26 VITALS
OXYGEN SATURATION: 94 % | HEIGHT: 64 IN | HEART RATE: 66 BPM | RESPIRATION RATE: 16 BRPM | BODY MASS INDEX: 32.16 KG/M2 | SYSTOLIC BLOOD PRESSURE: 121 MMHG | WEIGHT: 188.38 LBS | TEMPERATURE: 97.6 F | DIASTOLIC BLOOD PRESSURE: 80 MMHG

## 2018-10-26 LAB
ANION GAP SERPL CALCULATED.3IONS-SCNC: 13 MMOL/L (ref 4–16)
BUN BLDV-MCNC: 23 MG/DL (ref 6–23)
CALCIUM SERPL-MCNC: 9 MG/DL (ref 8.3–10.6)
CHLORIDE BLD-SCNC: 103 MMOL/L (ref 99–110)
CO2: 23 MMOL/L (ref 21–32)
CREAT SERPL-MCNC: 1 MG/DL (ref 0.9–1.3)
GFR AFRICAN AMERICAN: >60 ML/MIN/1.73M2
GFR NON-AFRICAN AMERICAN: >60 ML/MIN/1.73M2
GLUCOSE BLD-MCNC: 121 MG/DL (ref 70–99)
HCT VFR BLD CALC: 49.9 % (ref 42–52)
HEMOGLOBIN: 16.9 GM/DL (ref 13.5–18)
MCH RBC QN AUTO: 31.2 PG (ref 27–31)
MCHC RBC AUTO-ENTMCNC: 33.9 % (ref 32–36)
MCV RBC AUTO: 92.1 FL (ref 78–100)
PDW BLD-RTO: 12.4 % (ref 11.7–14.9)
PLATELET # BLD: 177 K/CU MM (ref 140–440)
PMV BLD AUTO: 9.9 FL (ref 7.5–11.1)
POTASSIUM SERPL-SCNC: 4 MMOL/L (ref 3.5–5.1)
RBC # BLD: 5.42 M/CU MM (ref 4.6–6.2)
SODIUM BLD-SCNC: 139 MMOL/L (ref 135–145)
WBC # BLD: 5.6 K/CU MM (ref 4–10.5)

## 2018-10-26 PROCEDURE — 85027 COMPLETE CBC AUTOMATED: CPT

## 2018-10-26 PROCEDURE — 80048 BASIC METABOLIC PNL TOTAL CA: CPT

## 2018-10-26 PROCEDURE — 36415 COLL VENOUS BLD VENIPUNCTURE: CPT

## 2018-10-26 ASSESSMENT — ENCOUNTER SYMPTOMS: SHORTNESS OF BREATH: 1

## 2018-11-02 ENCOUNTER — ANESTHESIA (OUTPATIENT)
Dept: OPERATING ROOM | Age: 58
DRG: 232 | End: 2018-11-02
Payer: MEDICAID

## 2018-11-02 ENCOUNTER — HOSPITAL ENCOUNTER (INPATIENT)
Age: 58
LOS: 1 days | Discharge: HOME | DRG: 232 | End: 2018-11-03
Attending: SURGERY | Admitting: SURGERY
Payer: MEDICAID

## 2018-11-02 VITALS
OXYGEN SATURATION: 98 % | TEMPERATURE: 98 F | SYSTOLIC BLOOD PRESSURE: 137 MMHG | RESPIRATION RATE: 15 BRPM | DIASTOLIC BLOOD PRESSURE: 78 MMHG

## 2018-11-02 DIAGNOSIS — K44.9 HIATAL HERNIA WITH GERD: Primary | ICD-10-CM

## 2018-11-02 DIAGNOSIS — K21.9 HIATAL HERNIA WITH GERD: Primary | ICD-10-CM

## 2018-11-02 LAB — GLUCOSE BLD-MCNC: 96 MG/DL (ref 70–99)

## 2018-11-02 PROCEDURE — 94150 VITAL CAPACITY TEST: CPT

## 2018-11-02 PROCEDURE — 82962 GLUCOSE BLOOD TEST: CPT

## 2018-11-02 PROCEDURE — 2500000003 HC RX 250 WO HCPCS: Performed by: SURGERY

## 2018-11-02 PROCEDURE — 2580000003 HC RX 258: Performed by: ANESTHESIOLOGY

## 2018-11-02 PROCEDURE — 88312 SPECIAL STAINS GROUP 1: CPT

## 2018-11-02 PROCEDURE — 8E0W4CZ ROBOTIC ASSISTED PROCEDURE OF TRUNK REGION, PERCUTANEOUS ENDOSCOPIC APPROACH: ICD-10-PCS | Performed by: SURGERY

## 2018-11-02 PROCEDURE — 3600000019 HC SURGERY ROBOT ADDTL 15MIN: Performed by: SURGERY

## 2018-11-02 PROCEDURE — 0FB04ZX EXCISION OF LIVER, PERCUTANEOUS ENDOSCOPIC APPROACH, DIAGNOSTIC: ICD-10-PCS | Performed by: SURGERY

## 2018-11-02 PROCEDURE — 6360000002 HC RX W HCPCS: Performed by: ANESTHESIOLOGY

## 2018-11-02 PROCEDURE — 3600000009 HC SURGERY ROBOT BASE: Performed by: SURGERY

## 2018-11-02 PROCEDURE — 0DV44ZZ RESTRICTION OF ESOPHAGOGASTRIC JUNCTION, PERCUTANEOUS ENDOSCOPIC APPROACH: ICD-10-PCS | Performed by: SURGERY

## 2018-11-02 PROCEDURE — 6370000000 HC RX 637 (ALT 250 FOR IP): Performed by: SURGERY

## 2018-11-02 PROCEDURE — 47379 UNLISTED LAPS PX LIVER: CPT | Performed by: SURGERY

## 2018-11-02 PROCEDURE — 6360000002 HC RX W HCPCS: Performed by: SURGERY

## 2018-11-02 PROCEDURE — 6360000002 HC RX W HCPCS: Performed by: NURSE ANESTHETIST, CERTIFIED REGISTERED

## 2018-11-02 PROCEDURE — S2900 ROBOTIC SURGICAL SYSTEM: HCPCS | Performed by: SURGERY

## 2018-11-02 PROCEDURE — 3700000001 HC ADD 15 MINUTES (ANESTHESIA): Performed by: SURGERY

## 2018-11-02 PROCEDURE — G0378 HOSPITAL OBSERVATION PER HR: HCPCS

## 2018-11-02 PROCEDURE — 94761 N-INVAS EAR/PLS OXIMETRY MLT: CPT

## 2018-11-02 PROCEDURE — 88313 SPECIAL STAINS GROUP 2: CPT

## 2018-11-02 PROCEDURE — 2500000003 HC RX 250 WO HCPCS: Performed by: NURSE ANESTHETIST, CERTIFIED REGISTERED

## 2018-11-02 PROCEDURE — 3700000000 HC ANESTHESIA ATTENDED CARE: Performed by: SURGERY

## 2018-11-02 PROCEDURE — 7100000001 HC PACU RECOVERY - ADDTL 15 MIN: Performed by: SURGERY

## 2018-11-02 PROCEDURE — 2700000000 HC OXYGEN THERAPY PER DAY

## 2018-11-02 PROCEDURE — 43281 LAP PARAESOPHAG HERN REPAIR: CPT | Performed by: SURGERY

## 2018-11-02 PROCEDURE — 88307 TISSUE EXAM BY PATHOLOGIST: CPT

## 2018-11-02 PROCEDURE — 2580000003 HC RX 258: Performed by: SURGERY

## 2018-11-02 PROCEDURE — 1200000000 HC SEMI PRIVATE

## 2018-11-02 PROCEDURE — 7100000000 HC PACU RECOVERY - FIRST 15 MIN: Performed by: SURGERY

## 2018-11-02 PROCEDURE — 2709999900 HC NON-CHARGEABLE SUPPLY: Performed by: SURGERY

## 2018-11-02 RX ORDER — LABETALOL HYDROCHLORIDE 5 MG/ML
5 INJECTION, SOLUTION INTRAVENOUS EVERY 10 MIN PRN
Status: DISCONTINUED | OUTPATIENT
Start: 2018-11-02 | End: 2018-11-02 | Stop reason: HOSPADM

## 2018-11-02 RX ORDER — FENTANYL CITRATE 50 UG/ML
50 INJECTION, SOLUTION INTRAMUSCULAR; INTRAVENOUS EVERY 5 MIN PRN
Status: DISCONTINUED | OUTPATIENT
Start: 2018-11-02 | End: 2018-11-02 | Stop reason: HOSPADM

## 2018-11-02 RX ORDER — ACETAMINOPHEN 325 MG/1
650 TABLET ORAL EVERY 4 HOURS PRN
Status: DISCONTINUED | OUTPATIENT
Start: 2018-11-02 | End: 2018-11-03 | Stop reason: HOSPADM

## 2018-11-02 RX ORDER — ALBUTEROL SULFATE 90 UG/1
1 AEROSOL, METERED RESPIRATORY (INHALATION) EVERY 6 HOURS PRN
Status: DISCONTINUED | OUTPATIENT
Start: 2018-11-02 | End: 2018-11-03 | Stop reason: HOSPADM

## 2018-11-02 RX ORDER — SODIUM CHLORIDE 9 MG/ML
INJECTION, SOLUTION INTRAVENOUS CONTINUOUS
Status: DISCONTINUED | OUTPATIENT
Start: 2018-11-02 | End: 2018-11-03 | Stop reason: HOSPADM

## 2018-11-02 RX ORDER — PROPOFOL 10 MG/ML
INJECTION, EMULSION INTRAVENOUS PRN
Status: DISCONTINUED | OUTPATIENT
Start: 2018-11-02 | End: 2018-11-02 | Stop reason: SDUPTHER

## 2018-11-02 RX ORDER — PROMETHAZINE HYDROCHLORIDE 25 MG/ML
6.25 INJECTION, SOLUTION INTRAMUSCULAR; INTRAVENOUS
Status: DISCONTINUED | OUTPATIENT
Start: 2018-11-02 | End: 2018-11-02 | Stop reason: HOSPADM

## 2018-11-02 RX ORDER — ONDANSETRON 2 MG/ML
4 INJECTION INTRAMUSCULAR; INTRAVENOUS EVERY 6 HOURS PRN
Status: DISCONTINUED | OUTPATIENT
Start: 2018-11-02 | End: 2018-11-03 | Stop reason: HOSPADM

## 2018-11-02 RX ORDER — ACETAMINOPHEN 10 MG/ML
1000 INJECTION, SOLUTION INTRAVENOUS ONCE
Status: COMPLETED | OUTPATIENT
Start: 2018-11-02 | End: 2018-11-02

## 2018-11-02 RX ORDER — HYDROMORPHONE HCL 110MG/55ML
1 PATIENT CONTROLLED ANALGESIA SYRINGE INTRAVENOUS EVERY 4 HOURS PRN
Status: DISCONTINUED | OUTPATIENT
Start: 2018-11-02 | End: 2018-11-03 | Stop reason: HOSPADM

## 2018-11-02 RX ORDER — HYDROMORPHONE HCL 110MG/55ML
PATIENT CONTROLLED ANALGESIA SYRINGE INTRAVENOUS PRN
Status: DISCONTINUED | OUTPATIENT
Start: 2018-11-02 | End: 2018-11-02 | Stop reason: SDUPTHER

## 2018-11-02 RX ORDER — HYDRALAZINE HYDROCHLORIDE 20 MG/ML
5 INJECTION INTRAMUSCULAR; INTRAVENOUS EVERY 10 MIN PRN
Status: DISCONTINUED | OUTPATIENT
Start: 2018-11-02 | End: 2018-11-02 | Stop reason: HOSPADM

## 2018-11-02 RX ORDER — FENTANYL CITRATE 50 UG/ML
INJECTION, SOLUTION INTRAMUSCULAR; INTRAVENOUS PRN
Status: DISCONTINUED | OUTPATIENT
Start: 2018-11-02 | End: 2018-11-02 | Stop reason: SDUPTHER

## 2018-11-02 RX ORDER — LORAZEPAM 1 MG/1
1 TABLET ORAL 3 TIMES DAILY
Status: DISCONTINUED | OUTPATIENT
Start: 2018-11-02 | End: 2018-11-03 | Stop reason: HOSPADM

## 2018-11-02 RX ORDER — DEXAMETHASONE SODIUM PHOSPHATE 4 MG/ML
INJECTION, SOLUTION INTRA-ARTICULAR; INTRALESIONAL; INTRAMUSCULAR; INTRAVENOUS; SOFT TISSUE PRN
Status: DISCONTINUED | OUTPATIENT
Start: 2018-11-02 | End: 2018-11-02 | Stop reason: SDUPTHER

## 2018-11-02 RX ORDER — CEFAZOLIN SODIUM 2 G/100ML
INJECTION, SOLUTION INTRAVENOUS
Status: COMPLETED
Start: 2018-11-02 | End: 2018-11-02

## 2018-11-02 RX ORDER — SODIUM CHLORIDE 0.9 % (FLUSH) 0.9 %
10 SYRINGE (ML) INJECTION PRN
Status: DISCONTINUED | OUTPATIENT
Start: 2018-11-02 | End: 2018-11-03 | Stop reason: HOSPADM

## 2018-11-02 RX ORDER — SUCCINYLCHOLINE/SOD CL,ISO/PF 100 MG/5ML
SYRINGE (ML) INTRAVENOUS PRN
Status: DISCONTINUED | OUTPATIENT
Start: 2018-11-02 | End: 2018-11-02 | Stop reason: SDUPTHER

## 2018-11-02 RX ORDER — MIDAZOLAM HYDROCHLORIDE 1 MG/ML
INJECTION INTRAMUSCULAR; INTRAVENOUS PRN
Status: DISCONTINUED | OUTPATIENT
Start: 2018-11-02 | End: 2018-11-02 | Stop reason: SDUPTHER

## 2018-11-02 RX ORDER — LIDOCAINE HYDROCHLORIDE 20 MG/ML
INJECTION, SOLUTION INFILTRATION; PERINEURAL PRN
Status: DISCONTINUED | OUTPATIENT
Start: 2018-11-02 | End: 2018-11-02 | Stop reason: SDUPTHER

## 2018-11-02 RX ORDER — MEPERIDINE HYDROCHLORIDE 25 MG/ML
12.5 INJECTION INTRAMUSCULAR; INTRAVENOUS; SUBCUTANEOUS EVERY 5 MIN PRN
Status: DISCONTINUED | OUTPATIENT
Start: 2018-11-02 | End: 2018-11-02 | Stop reason: HOSPADM

## 2018-11-02 RX ORDER — ONDANSETRON 2 MG/ML
INJECTION INTRAMUSCULAR; INTRAVENOUS PRN
Status: DISCONTINUED | OUTPATIENT
Start: 2018-11-02 | End: 2018-11-02 | Stop reason: SDUPTHER

## 2018-11-02 RX ORDER — OXYCODONE HYDROCHLORIDE AND ACETAMINOPHEN 5; 325 MG/1; MG/1
1 TABLET ORAL EVERY 4 HOURS PRN
Status: DISCONTINUED | OUTPATIENT
Start: 2018-11-02 | End: 2018-11-03 | Stop reason: HOSPADM

## 2018-11-02 RX ORDER — ROCURONIUM BROMIDE 10 MG/ML
INJECTION, SOLUTION INTRAVENOUS PRN
Status: DISCONTINUED | OUTPATIENT
Start: 2018-11-02 | End: 2018-11-02 | Stop reason: SDUPTHER

## 2018-11-02 RX ORDER — BUPIVACAINE HYDROCHLORIDE 5 MG/ML
INJECTION, SOLUTION EPIDURAL; INTRACAUDAL
Status: COMPLETED | OUTPATIENT
Start: 2018-11-02 | End: 2018-11-02

## 2018-11-02 RX ORDER — CEFAZOLIN SODIUM 2 G/100ML
2 INJECTION, SOLUTION INTRAVENOUS ONCE
Status: COMPLETED | OUTPATIENT
Start: 2018-11-02 | End: 2018-11-02

## 2018-11-02 RX ORDER — HYDROMORPHONE HCL 110MG/55ML
0.5 PATIENT CONTROLLED ANALGESIA SYRINGE INTRAVENOUS EVERY 5 MIN PRN
Status: DISCONTINUED | OUTPATIENT
Start: 2018-11-02 | End: 2018-11-02 | Stop reason: HOSPADM

## 2018-11-02 RX ORDER — LATANOPROST 50 UG/ML
1 SOLUTION/ DROPS OPHTHALMIC NIGHTLY
Status: DISCONTINUED | OUTPATIENT
Start: 2018-11-02 | End: 2018-11-03 | Stop reason: HOSPADM

## 2018-11-02 RX ORDER — SODIUM CHLORIDE 0.9 % (FLUSH) 0.9 %
10 SYRINGE (ML) INJECTION EVERY 12 HOURS SCHEDULED
Status: DISCONTINUED | OUTPATIENT
Start: 2018-11-02 | End: 2018-11-03 | Stop reason: HOSPADM

## 2018-11-02 RX ADMIN — CEFAZOLIN SODIUM 2 G: 2 INJECTION, SOLUTION INTRAVENOUS at 07:53

## 2018-11-02 RX ADMIN — OXYCODONE AND ACETAMINOPHEN 1 TABLET: 5; 325 TABLET ORAL at 13:01

## 2018-11-02 RX ADMIN — LIDOCAINE HYDROCHLORIDE 100 MG: 20 INJECTION, SOLUTION INFILTRATION; PERINEURAL at 07:44

## 2018-11-02 RX ADMIN — ROCURONIUM BROMIDE 10 MG: 50 INJECTION, SOLUTION INTRAVENOUS at 07:44

## 2018-11-02 RX ADMIN — ROCURONIUM BROMIDE 20 MG: 50 INJECTION, SOLUTION INTRAVENOUS at 08:57

## 2018-11-02 RX ADMIN — SODIUM CHLORIDE: 9 INJECTION, SOLUTION INTRAVENOUS at 21:39

## 2018-11-02 RX ADMIN — DEXAMETHASONE SODIUM PHOSPHATE 8 MG: 4 INJECTION, SOLUTION INTRAMUSCULAR; INTRAVENOUS at 08:12

## 2018-11-02 RX ADMIN — HYDROMORPHONE HYDROCHLORIDE 1 MG: 2 INJECTION INTRAMUSCULAR; INTRAVENOUS; SUBCUTANEOUS at 08:19

## 2018-11-02 RX ADMIN — SUGAMMADEX 200 MG: 100 INJECTION, SOLUTION INTRAVENOUS at 10:13

## 2018-11-02 RX ADMIN — LATANOPROST 1 DROP: 50 SOLUTION OPHTHALMIC at 21:47

## 2018-11-02 RX ADMIN — HYDROMORPHONE HYDROCHLORIDE 0.5 MG: 2 INJECTION INTRAMUSCULAR; INTRAVENOUS; SUBCUTANEOUS at 11:15

## 2018-11-02 RX ADMIN — HYDROMORPHONE HYDROCHLORIDE 0.5 MG: 2 INJECTION INTRAMUSCULAR; INTRAVENOUS; SUBCUTANEOUS at 10:57

## 2018-11-02 RX ADMIN — ACETAMINOPHEN 1000 MG: 10 INJECTION, SOLUTION INTRAVENOUS at 09:58

## 2018-11-02 RX ADMIN — SODIUM CHLORIDE: 9 INJECTION, SOLUTION INTRAVENOUS at 11:04

## 2018-11-02 RX ADMIN — SODIUM CHLORIDE: 9 INJECTION, SOLUTION INTRAVENOUS at 07:11

## 2018-11-02 RX ADMIN — FENTANYL CITRATE 50 MCG: 50 INJECTION INTRAMUSCULAR; INTRAVENOUS at 07:42

## 2018-11-02 RX ADMIN — ROCURONIUM BROMIDE 20 MG: 50 INJECTION, SOLUTION INTRAVENOUS at 09:34

## 2018-11-02 RX ADMIN — LORAZEPAM 1 MG: 1 TABLET ORAL at 21:39

## 2018-11-02 RX ADMIN — SODIUM CHLORIDE: 9 INJECTION, SOLUTION INTRAVENOUS at 11:57

## 2018-11-02 RX ADMIN — ROCURONIUM BROMIDE 40 MG: 50 INJECTION, SOLUTION INTRAVENOUS at 07:48

## 2018-11-02 RX ADMIN — Medication 100 MG: at 07:45

## 2018-11-02 RX ADMIN — MIDAZOLAM HYDROCHLORIDE 2 MG: 1 INJECTION, SOLUTION INTRAMUSCULAR; INTRAVENOUS at 07:34

## 2018-11-02 RX ADMIN — FENTANYL CITRATE 50 MCG: 50 INJECTION INTRAMUSCULAR; INTRAVENOUS at 07:38

## 2018-11-02 RX ADMIN — SODIUM CHLORIDE: 9 INJECTION, SOLUTION INTRAVENOUS at 08:59

## 2018-11-02 RX ADMIN — LORAZEPAM 1 MG: 1 TABLET ORAL at 14:23

## 2018-11-02 RX ADMIN — ONDANSETRON HYDROCHLORIDE 4 MG: 2 SOLUTION INTRAMUSCULAR; INTRAVENOUS at 10:02

## 2018-11-02 RX ADMIN — PROPOFOL 200 MG: 10 INJECTION, EMULSION INTRAVENOUS at 07:44

## 2018-11-02 ASSESSMENT — ENCOUNTER SYMPTOMS
ANAL BLEEDING: 0
STRIDOR: 0
EYE ITCHING: 0
COLOR CHANGE: 0
CHOKING: 0
SORE THROAT: 0
CONSTIPATION: 0
APNEA: 0
TROUBLE SWALLOWING: 1
EYE REDNESS: 0
BACK PAIN: 0
RECTAL PAIN: 0
PHOTOPHOBIA: 0

## 2018-11-02 ASSESSMENT — PAIN DESCRIPTION - ORIENTATION
ORIENTATION: MID
ORIENTATION: LOWER;MID
ORIENTATION: MID

## 2018-11-02 ASSESSMENT — PULMONARY FUNCTION TESTS
PIF_VALUE: 26
PIF_VALUE: 21
PIF_VALUE: 25
PIF_VALUE: 21
PIF_VALUE: 26
PIF_VALUE: 20
PIF_VALUE: 17
PIF_VALUE: 1
PIF_VALUE: 21
PIF_VALUE: 27
PIF_VALUE: 14
PIF_VALUE: 16
PIF_VALUE: 26
PIF_VALUE: 28
PIF_VALUE: 22
PIF_VALUE: 22
PIF_VALUE: 1
PIF_VALUE: 17
PIF_VALUE: 16
PIF_VALUE: 27
PIF_VALUE: 27
PIF_VALUE: 16
PIF_VALUE: 24
PIF_VALUE: 27
PIF_VALUE: 25
PIF_VALUE: 21
PIF_VALUE: 22
PIF_VALUE: 29
PIF_VALUE: 16
PIF_VALUE: 21
PIF_VALUE: 2
PIF_VALUE: 24
PIF_VALUE: 27
PIF_VALUE: 17
PIF_VALUE: 21
PIF_VALUE: 0
PIF_VALUE: 28
PIF_VALUE: 16
PIF_VALUE: 26
PIF_VALUE: 28
PIF_VALUE: 27
PIF_VALUE: 28
PIF_VALUE: 17
PIF_VALUE: 28
PIF_VALUE: 17
PIF_VALUE: 26
PIF_VALUE: 24
PIF_VALUE: 25
PIF_VALUE: 17
PIF_VALUE: 25
PIF_VALUE: 26
PIF_VALUE: 25
PIF_VALUE: 24
PIF_VALUE: 8
PIF_VALUE: 23
PIF_VALUE: 1
PIF_VALUE: 0
PIF_VALUE: 16
PIF_VALUE: 27
PIF_VALUE: 12
PIF_VALUE: 25
PIF_VALUE: 17
PIF_VALUE: 28
PIF_VALUE: 24
PIF_VALUE: 19
PIF_VALUE: 25
PIF_VALUE: 0
PIF_VALUE: 17
PIF_VALUE: 16
PIF_VALUE: 25
PIF_VALUE: 26
PIF_VALUE: 26
PIF_VALUE: 27
PIF_VALUE: 27
PIF_VALUE: 0
PIF_VALUE: 27
PIF_VALUE: 19
PIF_VALUE: 27
PIF_VALUE: 24
PIF_VALUE: 21
PIF_VALUE: 17
PIF_VALUE: 25
PIF_VALUE: 27
PIF_VALUE: 20
PIF_VALUE: 21
PIF_VALUE: 25
PIF_VALUE: 17
PIF_VALUE: 23
PIF_VALUE: 23
PIF_VALUE: 24
PIF_VALUE: 25
PIF_VALUE: 25
PIF_VALUE: 21
PIF_VALUE: 21
PIF_VALUE: 25
PIF_VALUE: 23
PIF_VALUE: 25
PIF_VALUE: 23
PIF_VALUE: 26
PIF_VALUE: 0
PIF_VALUE: 25
PIF_VALUE: 20
PIF_VALUE: 25
PIF_VALUE: 17
PIF_VALUE: 17
PIF_VALUE: 26
PIF_VALUE: 17
PIF_VALUE: 26
PIF_VALUE: 28
PIF_VALUE: 25
PIF_VALUE: 16
PIF_VALUE: 25
PIF_VALUE: 18
PIF_VALUE: 16
PIF_VALUE: 26
PIF_VALUE: 26
PIF_VALUE: 16
PIF_VALUE: 17
PIF_VALUE: 21
PIF_VALUE: 27
PIF_VALUE: 26
PIF_VALUE: 25
PIF_VALUE: 28
PIF_VALUE: 25
PIF_VALUE: 0
PIF_VALUE: 25
PIF_VALUE: 26
PIF_VALUE: 25
PIF_VALUE: 1
PIF_VALUE: 17
PIF_VALUE: 25
PIF_VALUE: 1
PIF_VALUE: 22
PIF_VALUE: 17
PIF_VALUE: 16
PIF_VALUE: 26
PIF_VALUE: 17
PIF_VALUE: 26
PIF_VALUE: 26
PIF_VALUE: 16
PIF_VALUE: 1
PIF_VALUE: 24
PIF_VALUE: 27
PIF_VALUE: 17
PIF_VALUE: 20
PIF_VALUE: 20
PIF_VALUE: 26
PIF_VALUE: 24
PIF_VALUE: 17
PIF_VALUE: 17
PIF_VALUE: 18
PIF_VALUE: 27
PIF_VALUE: 25
PIF_VALUE: 17
PIF_VALUE: 24
PIF_VALUE: 25
PIF_VALUE: 27
PIF_VALUE: 28
PIF_VALUE: 24
PIF_VALUE: 27
PIF_VALUE: 28
PIF_VALUE: 22
PIF_VALUE: 16
PIF_VALUE: 28
PIF_VALUE: 1
PIF_VALUE: 0
PIF_VALUE: 25
PIF_VALUE: 26
PIF_VALUE: 24

## 2018-11-02 ASSESSMENT — PAIN DESCRIPTION - FREQUENCY
FREQUENCY: CONTINUOUS

## 2018-11-02 ASSESSMENT — PAIN SCALES - GENERAL
PAINLEVEL_OUTOF10: 6
PAINLEVEL_OUTOF10: 3
PAINLEVEL_OUTOF10: 5
PAINLEVEL_OUTOF10: 8

## 2018-11-02 ASSESSMENT — PAIN DESCRIPTION - PROGRESSION
CLINICAL_PROGRESSION: GRADUALLY IMPROVING
CLINICAL_PROGRESSION: GRADUALLY IMPROVING

## 2018-11-02 ASSESSMENT — PAIN DESCRIPTION - LOCATION
LOCATION: ABDOMEN;CHEST
LOCATION: ABDOMEN
LOCATION: ABDOMEN

## 2018-11-02 ASSESSMENT — PAIN DESCRIPTION - PAIN TYPE
TYPE: SURGICAL PAIN

## 2018-11-02 ASSESSMENT — PAIN DESCRIPTION - DESCRIPTORS
DESCRIPTORS: ACHING

## 2018-11-02 ASSESSMENT — PAIN - FUNCTIONAL ASSESSMENT: PAIN_FUNCTIONAL_ASSESSMENT: 0-10

## 2018-11-02 NOTE — OP NOTE
fashion. 1% buffered lidocaine was infiltrated in the right midepigastrium, and a 12 mm Optiview port was placed under direct vision and the abdomen was insufflated to 15 mm of pressure. We placed our 8 mm ports in the standard fashion including a right flank port used for the liver retractor which was used to elevate the left lobe of the liver. The robot was docked successfully after the pt was in reverse trendelenburg. We began our dissection by dividing the pars flaccida preserving the hepatic branch of the anterior vagus nerve and then mobilizing the esophagus at the right lacy identifying the junction with the left lacy posterior to the esophagus. The phrenoesophageal membrane was divided  anteriorly and turned my attention to the greater curvature of the stomach. The short gastric vessels along the greater curvature through the splenic hilum up to the level of the left lacy were divided. The esophagus was freed from the left lacy. The esophagus was now circumferentially mobilized at the hiatus. The gastroesophageal fat pad which was mobilized from lateral to medial off the gastroesophageal junction creating a retroesophageal window inside both the anterior and posterior vagus nerves. Both the anterior and posterior vagus nerves were well visualized and preserved. The crura was then closed posterior to the esophagus with interrupted 2-0 Suture  within about 5 mm of the esophagus. At this point Dr Damian Fam performed a full wrap of the cardia through the retroesophageal window. This was a 3 cm long wrap performed using 2-0 ethibond suture and incorporating a portion of the wall of the esophagus. There was 2 cm of Intraabdominal esophagus above the fundoplication. The liver retractor was removed. The liver appeared nodular and cirrhotic. Two passes of the jennifer-cut liver biopsy was obtained laparoscopically. The biopsy site was hemostatic with Bovie.  The right midepigastric port was removed from the fascia and

## 2018-11-02 NOTE — H&P
Harley Walker MD      General Surgery       Subjective:     Patient is a 66 y.o.  male scheduled for robotic assisted Nissen fundoplication. Indications for procedure are chronic severe acid reflux . 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 05/18/2018    Epigastric pain 04/20/2017    Environmental allergies 04/04/2017    Drug-seeking behavior 03/28/2017    Benzodiazepine dependence ([address] 03/15/2017    Medication monitoring encounter 71/48/4117    Monoallelic mutation of ZWT5Q6 gene 34/31/4736    Monoallelic mutation of SJZ9S gene 54/69/1457    Biallelic mutation of [id number] gene 09/72/2352    Monoallelic mutation of RXO3E2 gene 02/17/2017    Moderate episode of recurrent major depressive disorder ([address] 02/03/2017    MAHESH (obstructive sleep apnea) 11/17/2015    DM type 2 (diabetes mellitus, type 2) ([address] 11/17/2015    Precordial pain 11/07/2013    Palpitations 02/28/2013    Hyperlipidemia     Anxiety     Hypertension     PAF (paroxysmal atrial fibrillation) ([address]      Past Medical History:   Diagnosis Date    Anxiety     \"Severe Anxiety Problems\"    Arthritis     \"Hands, Back, Right Hip\"    Back pain     \"All The Time\"    Depression     Diabetes mellitus ([address] Dx 2000's    Environmental allergies     GERD (gastroesophageal reflux disease)     Glaucoma     H/O cardiovascular stress test 8/08, 12/3/13    12/3/13 result:  Normal perfusion in the distribution of all coronaries, normal LV size and function, EF 61%.  H/O echocardiogram 9/15/17,4/15/08    Mild concentric LVH with normal systolic function. EF 50-55% Trace TR  No evidence of pericardial effusion.      Hiatal hernia     Hiatal hernia     History of cardiac monitoring 09/18/2017    Conclusion: Findings suggesting normal sinus rhythm with physiologic heart rate variations and symptoms of

## 2018-11-03 VITALS
SYSTOLIC BLOOD PRESSURE: 120 MMHG | TEMPERATURE: 98.4 F | OXYGEN SATURATION: 95 % | WEIGHT: 188 LBS | DIASTOLIC BLOOD PRESSURE: 68 MMHG | BODY MASS INDEX: 32.1 KG/M2 | RESPIRATION RATE: 17 BRPM | HEIGHT: 64 IN | HEART RATE: 63 BPM

## 2018-11-03 LAB
ANION GAP SERPL CALCULATED.3IONS-SCNC: 14 MMOL/L (ref 4–16)
BASOPHILS ABSOLUTE: 0 K/CU MM
BASOPHILS RELATIVE PERCENT: 0.1 % (ref 0–1)
BUN BLDV-MCNC: 10 MG/DL (ref 6–23)
CALCIUM SERPL-MCNC: 8.7 MG/DL (ref 8.3–10.6)
CHLORIDE BLD-SCNC: 104 MMOL/L (ref 99–110)
CO2: 23 MMOL/L (ref 21–32)
CREAT SERPL-MCNC: 0.9 MG/DL (ref 0.9–1.3)
DIFFERENTIAL TYPE: ABNORMAL
EOSINOPHILS ABSOLUTE: 0 K/CU MM
EOSINOPHILS RELATIVE PERCENT: 0 % (ref 0–3)
GFR AFRICAN AMERICAN: >60 ML/MIN/1.73M2
GFR NON-AFRICAN AMERICAN: >60 ML/MIN/1.73M2
GLUCOSE BLD-MCNC: 120 MG/DL (ref 70–99)
HCT VFR BLD CALC: 47.5 % (ref 42–52)
HEMOGLOBIN: 15.6 GM/DL (ref 13.5–18)
IMMATURE NEUTROPHIL %: 0.2 % (ref 0–0.43)
LYMPHOCYTES ABSOLUTE: 1.1 K/CU MM
LYMPHOCYTES RELATIVE PERCENT: 12.9 % (ref 24–44)
MCH RBC QN AUTO: 30.7 PG (ref 27–31)
MCHC RBC AUTO-ENTMCNC: 32.8 % (ref 32–36)
MCV RBC AUTO: 93.5 FL (ref 78–100)
MONOCYTES ABSOLUTE: 0.8 K/CU MM
MONOCYTES RELATIVE PERCENT: 9.2 % (ref 0–4)
NUCLEATED RBC %: 0 %
PDW BLD-RTO: 12.1 % (ref 11.7–14.9)
PLATELET # BLD: 191 K/CU MM (ref 140–440)
PMV BLD AUTO: 9.7 FL (ref 7.5–11.1)
POTASSIUM SERPL-SCNC: 4 MMOL/L (ref 3.5–5.1)
RBC # BLD: 5.08 M/CU MM (ref 4.6–6.2)
SEGMENTED NEUTROPHILS ABSOLUTE COUNT: 6.8 K/CU MM
SEGMENTED NEUTROPHILS RELATIVE PERCENT: 77.6 % (ref 36–66)
SODIUM BLD-SCNC: 141 MMOL/L (ref 135–145)
TOTAL IMMATURE NEUTOROPHIL: 0.02 K/CU MM
TOTAL NUCLEATED RBC: 0 K/CU MM
WBC # BLD: 8.8 K/CU MM (ref 4–10.5)

## 2018-11-03 PROCEDURE — 80048 BASIC METABOLIC PNL TOTAL CA: CPT

## 2018-11-03 PROCEDURE — 97161 PT EVAL LOW COMPLEX 20 MIN: CPT

## 2018-11-03 PROCEDURE — G8979 MOBILITY GOAL STATUS: HCPCS

## 2018-11-03 PROCEDURE — G0378 HOSPITAL OBSERVATION PER HR: HCPCS

## 2018-11-03 PROCEDURE — 85025 COMPLETE CBC W/AUTO DIFF WBC: CPT

## 2018-11-03 PROCEDURE — G8978 MOBILITY CURRENT STATUS: HCPCS

## 2018-11-03 PROCEDURE — 99024 POSTOP FOLLOW-UP VISIT: CPT | Performed by: SURGERY

## 2018-11-03 PROCEDURE — 6370000000 HC RX 637 (ALT 250 FOR IP): Performed by: SURGERY

## 2018-11-03 PROCEDURE — G8980 MOBILITY D/C STATUS: HCPCS

## 2018-11-03 PROCEDURE — 36415 COLL VENOUS BLD VENIPUNCTURE: CPT

## 2018-11-03 RX ORDER — ZOLPIDEM TARTRATE 5 MG/1
5 TABLET ORAL NIGHTLY PRN
Status: DISCONTINUED | OUTPATIENT
Start: 2018-11-03 | End: 2018-11-03 | Stop reason: HOSPADM

## 2018-11-03 RX ORDER — OXYCODONE HYDROCHLORIDE 5 MG/1
5 TABLET ORAL EVERY 6 HOURS PRN
Qty: 20 TABLET | Refills: 0 | Status: SHIPPED | OUTPATIENT
Start: 2018-11-03 | End: 2018-11-08

## 2018-11-03 RX ORDER — ONDANSETRON 4 MG/1
4 TABLET, FILM COATED ORAL DAILY PRN
Qty: 20 TABLET | Refills: 0 | Status: SHIPPED | OUTPATIENT
Start: 2018-11-03 | End: 2019-03-18

## 2018-11-03 RX ADMIN — ACETAMINOPHEN 650 MG: 325 TABLET, FILM COATED ORAL at 03:31

## 2018-11-03 RX ADMIN — ACETAMINOPHEN 650 MG: 325 TABLET, FILM COATED ORAL at 10:31

## 2018-11-03 RX ADMIN — LORAZEPAM 1 MG: 1 TABLET ORAL at 08:50

## 2018-11-03 ASSESSMENT — PAIN DESCRIPTION - DESCRIPTORS: DESCRIPTORS: ACHING

## 2018-11-03 ASSESSMENT — PAIN SCALES - GENERAL
PAINLEVEL_OUTOF10: 8
PAINLEVEL_OUTOF10: 5
PAINLEVEL_OUTOF10: 3

## 2018-11-03 ASSESSMENT — PAIN DESCRIPTION - PROGRESSION: CLINICAL_PROGRESSION: GRADUALLY IMPROVING

## 2018-11-03 ASSESSMENT — PAIN DESCRIPTION - PAIN TYPE: TYPE: SURGICAL PAIN

## 2018-11-03 ASSESSMENT — PAIN DESCRIPTION - LOCATION: LOCATION: ABDOMEN;BACK

## 2018-11-03 ASSESSMENT — PAIN DESCRIPTION - FREQUENCY: FREQUENCY: CONTINUOUS

## 2018-11-03 NOTE — DISCHARGE SUMMARY
Physician Discharge Summary     Patient ID:  Anika Casas  [id number]  89 y.o.  1960    Admit date: 11/2/2018    Discharge date and time: No discharge date for patient encounter. Admitting Physician: Juan Jose Borja MD     Discharge Physician:same    Admission Diagnoses: Hiatal hernia with GERD [K21.9, K44.9]    Discharge Diagnoses: same    Admission Condition:good    Discharged Condition: good    Indication for Admission: post op    Hospital Course:  Patient Had uneventful hospital course. Patient was able to tolerate diet ambulate and have regular bowel function present discharge. Consults: none    Outstanding Order Results     No orders found for last 30 day(s). Treatments: IV hydration and surgery: hiatal hernia      Discharge Exam:    Physical Exam   Constitutional: He is oriented to person, place, and time. He appears well-developed and well-nourished. HENT:   Head: Normocephalic. Eyes: Pupils are equal, round, and reactive to light. Neck: Normal range of motion. Neck supple. Cardiovascular: Normal rate. Pulmonary/Chest: Effort normal.   Abdominal: Soft. He exhibits no distension and no mass. There is no tenderness. There is no rebound and no guarding. Musculoskeletal: Normal range of motion. Neurological: He is alert and oriented to person, place, and time. Skin: Skin is warm. Psychiatric: He has a normal mood and affect. Disposition: home    Patient Instructions:   [unfilled]  Activity: activity as tolerated  Diet:full  Wound Care: keep wound clean and dry    Follow-up with Dr Juan Jose Borja by calling [phone]. The Office staff will determine follow up time perprotocol.     Signed:  Chandana Urena MD

## 2018-11-07 ENCOUNTER — TELEPHONE (OUTPATIENT)
Dept: SURGERY | Age: 58
End: 2018-11-07

## 2018-11-08 ENCOUNTER — OFFICE VISIT (OUTPATIENT)
Dept: SURGERY | Age: 58
End: 2018-11-08

## 2018-11-08 VITALS
WEIGHT: 188.05 LBS | HEART RATE: 80 BPM | DIASTOLIC BLOOD PRESSURE: 76 MMHG | HEIGHT: 64 IN | BODY MASS INDEX: 32.11 KG/M2 | SYSTOLIC BLOOD PRESSURE: 112 MMHG

## 2018-11-08 DIAGNOSIS — K21.00 GERD WITH ESOPHAGITIS: Primary | ICD-10-CM

## 2018-11-08 PROCEDURE — 99024 POSTOP FOLLOW-UP VISIT: CPT | Performed by: SURGERY

## 2018-11-08 RX ORDER — ACETAMINOPHEN 325 MG/1
650 TABLET ORAL EVERY 6 HOURS PRN
COMMUNITY

## 2018-11-26 ENCOUNTER — OFFICE VISIT (OUTPATIENT)
Dept: SURGERY | Age: 58
End: 2018-11-26

## 2018-11-26 VITALS
SYSTOLIC BLOOD PRESSURE: 110 MMHG | BODY MASS INDEX: 30.56 KG/M2 | HEIGHT: 65 IN | HEART RATE: 66 BPM | RESPIRATION RATE: 15 BRPM | WEIGHT: 183.4 LBS | DIASTOLIC BLOOD PRESSURE: 60 MMHG

## 2018-11-26 DIAGNOSIS — K21.00 GERD WITH ESOPHAGITIS: Primary | ICD-10-CM

## 2018-11-26 PROCEDURE — 99024 POSTOP FOLLOW-UP VISIT: CPT | Performed by: SURGERY

## 2018-11-26 NOTE — PROGRESS NOTES
Chief Complaint   Patient presents with    Post-Op Check     P/O #2 [facility] 11/2/18 for HHR/Nissen         SUBJECTIVE:  Patient here for post op visit s/p Robotic Nissen Fundoplication. This is 2nd visit. Denies any vomiting. Pain is minimal.  Wounds: min bruising and no discharge. OBJECTIVE:  Physical Exam    Wound well healedwithout signs of active infection. Suture line intact. Abdomen soft, nontender, nondistended. ASSESSMENT:  Patient doing well on this post operative check. Wounds well healed. 1. GERD with esophagitis        PLAN:  RTC: in about 3 weeks  Continue same  Increase activity as tolerated        No orders of the defined types were placed in this encounter. No orders of the defined types were placed in this encounter. Follow Up: No Follow-up on file. Scribe Authentication Statement  Lois Nava scribed portions of this documentation for and in the presence of Merle Mina MD on 11/26/18 at 10:02 AM.    Provider Catrachito Barnes M.D., personally performed the services described in this documentation and they were scribed in my presence by Gumaro Menezes MA. The documentation is both accurate and complete.        Merle Mina MD

## 2018-12-17 ENCOUNTER — OFFICE VISIT (OUTPATIENT)
Dept: SURGERY | Age: 58
End: 2018-12-17

## 2018-12-17 VITALS
SYSTOLIC BLOOD PRESSURE: 116 MMHG | BODY MASS INDEX: 30.49 KG/M2 | HEART RATE: 85 BPM | DIASTOLIC BLOOD PRESSURE: 78 MMHG | WEIGHT: 183 LBS | HEIGHT: 65 IN

## 2018-12-17 DIAGNOSIS — K21.00 GERD WITH ESOPHAGITIS: Primary | ICD-10-CM

## 2018-12-17 PROCEDURE — 99024 POSTOP FOLLOW-UP VISIT: CPT | Performed by: SURGERY

## 2019-02-28 ENCOUNTER — HOSPITAL ENCOUNTER (OUTPATIENT)
Dept: GENERAL RADIOLOGY | Age: 59
Discharge: HOME | End: 2019-02-28
Payer: MEDICAID

## 2019-02-28 ENCOUNTER — HOSPITAL ENCOUNTER (OUTPATIENT)
Age: 59
Discharge: HOME | End: 2019-02-28
Payer: MEDICAID

## 2019-02-28 DIAGNOSIS — M25.551 RIGHT HIP PAIN: ICD-10-CM

## 2019-02-28 PROCEDURE — 72170 X-RAY EXAM OF PELVIS: CPT

## 2019-02-28 PROCEDURE — 73502 X-RAY EXAM HIP UNI 2-3 VIEWS: CPT

## 2019-03-13 ENCOUNTER — TELEPHONE (OUTPATIENT)
Dept: CARDIOLOGY CLINIC | Age: 59
End: 2019-03-13

## 2019-03-18 ENCOUNTER — HOSPITAL ENCOUNTER (EMERGENCY)
Age: 59
Discharge: HOME | End: 2019-03-18
Attending: EMERGENCY MEDICINE
Payer: MEDICAID

## 2019-03-18 VITALS
TEMPERATURE: 98.2 F | BODY MASS INDEX: 29.99 KG/M2 | HEART RATE: 97 BPM | WEIGHT: 180 LBS | SYSTOLIC BLOOD PRESSURE: 131 MMHG | RESPIRATION RATE: 18 BRPM | HEIGHT: 65 IN | OXYGEN SATURATION: 97 % | DIASTOLIC BLOOD PRESSURE: 87 MMHG

## 2019-03-18 DIAGNOSIS — T14.8XXA OPEN WOUND: Primary | ICD-10-CM

## 2019-03-18 DIAGNOSIS — Z86.39 HISTORY OF DIABETES MELLITUS: ICD-10-CM

## 2019-03-18 PROCEDURE — 6370000000 HC RX 637 (ALT 250 FOR IP)

## 2019-03-18 PROCEDURE — 87071 CULTURE AEROBIC QUANT OTHER: CPT

## 2019-03-18 PROCEDURE — 6370000000 HC RX 637 (ALT 250 FOR IP): Performed by: EMERGENCY MEDICINE

## 2019-03-18 PROCEDURE — 87073 CULTURE BACTERIA ANAEROBIC: CPT

## 2019-03-18 PROCEDURE — 99284 EMERGENCY DEPT VISIT MOD MDM: CPT

## 2019-03-18 RX ORDER — CLINDAMYCIN HYDROCHLORIDE 150 MG/1
450 CAPSULE ORAL ONCE
Status: COMPLETED | OUTPATIENT
Start: 2019-03-18 | End: 2019-03-18

## 2019-03-18 RX ORDER — CLINDAMYCIN HYDROCHLORIDE 150 MG/1
450 CAPSULE ORAL 3 TIMES DAILY
Qty: 126 CAPSULE | Refills: 0 | Status: SHIPPED | OUTPATIENT
Start: 2019-03-18 | End: 2019-03-26 | Stop reason: SINTOL

## 2019-03-18 RX ORDER — DIAPER,BRIEF,INFANT-TODD,DISP
EACH MISCELLANEOUS
Status: COMPLETED
Start: 2019-03-18 | End: 2019-03-18

## 2019-03-18 RX ADMIN — BACITRACIN ZINC 2 G: 500 OINTMENT TOPICAL at 21:48

## 2019-03-18 RX ADMIN — CLINDAMYCIN HYDROCHLORIDE 450 MG: 150 CAPSULE ORAL at 21:48

## 2019-03-18 ASSESSMENT — PAIN DESCRIPTION - PAIN TYPE: TYPE: ACUTE PAIN

## 2019-03-18 ASSESSMENT — PAIN DESCRIPTION - ORIENTATION: ORIENTATION: RIGHT

## 2019-03-18 ASSESSMENT — PAIN SCALES - GENERAL: PAINLEVEL_OUTOF10: 2

## 2019-03-18 ASSESSMENT — PAIN DESCRIPTION - LOCATION: LOCATION: ABDOMEN

## 2019-03-22 LAB
CULTURE: ABNORMAL
CULTURE: ABNORMAL
Lab: ABNORMAL
SPECIMEN: ABNORMAL

## 2019-03-26 ENCOUNTER — OFFICE VISIT (OUTPATIENT)
Dept: CARDIOLOGY CLINIC | Age: 59
End: 2019-03-26
Payer: MEDICAID

## 2019-03-26 VITALS
RESPIRATION RATE: 16 BRPM | HEART RATE: 84 BPM | DIASTOLIC BLOOD PRESSURE: 88 MMHG | SYSTOLIC BLOOD PRESSURE: 122 MMHG | BODY MASS INDEX: 29.99 KG/M2 | WEIGHT: 180 LBS | HEIGHT: 65 IN

## 2019-03-26 DIAGNOSIS — I48.0 PAF (PAROXYSMAL ATRIAL FIBRILLATION) (HCC): ICD-10-CM

## 2019-03-26 DIAGNOSIS — Z01.818 PRE-OP TESTING: Primary | ICD-10-CM

## 2019-03-26 DIAGNOSIS — E78.2 MIXED HYPERLIPIDEMIA: ICD-10-CM

## 2019-03-26 DIAGNOSIS — G47.33 OSA (OBSTRUCTIVE SLEEP APNEA): ICD-10-CM

## 2019-03-26 DIAGNOSIS — M16.11 ARTHRITIS OF RIGHT HIP: ICD-10-CM

## 2019-03-26 PROCEDURE — G8484 FLU IMMUNIZE NO ADMIN: HCPCS | Performed by: INTERNAL MEDICINE

## 2019-03-26 PROCEDURE — G8417 CALC BMI ABV UP PARAM F/U: HCPCS | Performed by: INTERNAL MEDICINE

## 2019-03-26 PROCEDURE — G8427 DOCREV CUR MEDS BY ELIG CLIN: HCPCS | Performed by: INTERNAL MEDICINE

## 2019-03-26 PROCEDURE — 99214 OFFICE O/P EST MOD 30 MIN: CPT | Performed by: INTERNAL MEDICINE

## 2019-03-26 PROCEDURE — 4004F PT TOBACCO SCREEN RCVD TLK: CPT | Performed by: INTERNAL MEDICINE

## 2019-03-26 PROCEDURE — 3017F COLORECTAL CA SCREEN DOC REV: CPT | Performed by: INTERNAL MEDICINE

## 2019-03-26 PROCEDURE — 93000 ELECTROCARDIOGRAM COMPLETE: CPT | Performed by: INTERNAL MEDICINE

## 2019-06-10 ENCOUNTER — HOSPITAL ENCOUNTER (OUTPATIENT)
Age: 59
Discharge: HOME | End: 2019-06-10
Payer: MEDICAID

## 2019-06-10 LAB
ALT SERPL-CCNC: 5 U/L (ref 10–40)
ANION GAP SERPL CALCULATED.3IONS-SCNC: 18 MMOL/L (ref 4–16)
BASOPHILS ABSOLUTE: 0.1 K/CU MM
BASOPHILS RELATIVE PERCENT: 1.3 % (ref 0–1)
BUN BLDV-MCNC: 23 MG/DL (ref 6–23)
CALCIUM SERPL-MCNC: 9.5 MG/DL (ref 8.3–10.6)
CHLORIDE BLD-SCNC: 104 MMOL/L (ref 99–110)
CHOLESTEROL, FASTING: 183 MG/DL
CO2: 20 MMOL/L (ref 21–32)
CREAT SERPL-MCNC: 1 MG/DL (ref 0.9–1.3)
DIFFERENTIAL TYPE: ABNORMAL
EOSINOPHILS ABSOLUTE: 0.3 K/CU MM
EOSINOPHILS RELATIVE PERCENT: 4.4 % (ref 0–3)
ESTIMATED AVERAGE GLUCOSE: 157 MG/DL
GFR AFRICAN AMERICAN: >60 ML/MIN/1.73M2
GFR NON-AFRICAN AMERICAN: >60 ML/MIN/1.73M2
GLUCOSE FASTING: 156 MG/DL (ref 70–99)
HBA1C MFR BLD: 7.1 % (ref 4.2–6.3)
HCT VFR BLD CALC: 51.9 % (ref 42–52)
HDLC SERPL-MCNC: 42 MG/DL
HEMOGLOBIN: 17.3 GM/DL (ref 13.5–18)
IMMATURE NEUTROPHIL %: 0.2 % (ref 0–0.43)
LDL CHOLESTEROL DIRECT: 140 MG/DL
LYMPHOCYTES ABSOLUTE: 2 K/CU MM
LYMPHOCYTES RELATIVE PERCENT: 32.4 % (ref 24–44)
MCH RBC QN AUTO: 30.6 PG (ref 27–31)
MCHC RBC AUTO-ENTMCNC: 33.3 % (ref 32–36)
MCV RBC AUTO: 91.7 FL (ref 78–100)
MONOCYTES ABSOLUTE: 0.7 K/CU MM
MONOCYTES RELATIVE PERCENT: 10.6 % (ref 0–4)
PDW BLD-RTO: 12.8 % (ref 11.7–14.9)
PLATELET # BLD: 200 K/CU MM (ref 140–440)
PMV BLD AUTO: 9.5 FL (ref 7.5–11.1)
POTASSIUM SERPL-SCNC: 4.1 MMOL/L (ref 3.5–5.1)
PROSTATE SPECIFIC ANTIGEN: 1.24 NG/ML (ref 0–4)
RBC # BLD: 5.66 M/CU MM (ref 4.6–6.2)
SEGMENTED NEUTROPHILS ABSOLUTE COUNT: 3.2 K/CU MM
SEGMENTED NEUTROPHILS RELATIVE PERCENT: 51.1 % (ref 36–66)
SODIUM BLD-SCNC: 142 MMOL/L (ref 135–145)
TOTAL IMMATURE NEUTOROPHIL: 0.01 K/CU MM
TRIGLYCERIDE, FASTING: 105 MG/DL
TSH HIGH SENSITIVITY: 1.84 UIU/ML (ref 0.27–4.2)
WBC # BLD: 6.2 K/CU MM (ref 4–10.5)

## 2019-06-10 PROCEDURE — 85025 COMPLETE CBC W/AUTO DIFF WBC: CPT

## 2019-06-10 PROCEDURE — 83036 HEMOGLOBIN GLYCOSYLATED A1C: CPT

## 2019-06-10 PROCEDURE — G0103 PSA SCREENING: HCPCS

## 2019-06-10 PROCEDURE — 84443 ASSAY THYROID STIM HORMONE: CPT

## 2019-06-10 PROCEDURE — 80061 LIPID PANEL: CPT

## 2019-06-10 PROCEDURE — 80048 BASIC METABOLIC PNL TOTAL CA: CPT

## 2019-06-10 PROCEDURE — 84460 ALANINE AMINO (ALT) (SGPT): CPT

## 2019-06-10 PROCEDURE — 36415 COLL VENOUS BLD VENIPUNCTURE: CPT

## 2020-02-23 ENCOUNTER — APPOINTMENT (OUTPATIENT)
Dept: GENERAL RADIOLOGY | Age: 60
End: 2020-02-23
Payer: MEDICAID

## 2020-02-23 ENCOUNTER — HOSPITAL ENCOUNTER (EMERGENCY)
Age: 60
Discharge: HOME | End: 2020-02-23
Attending: EMERGENCY MEDICINE
Payer: MEDICAID

## 2020-02-23 VITALS
HEIGHT: 66 IN | HEART RATE: 95 BPM | OXYGEN SATURATION: 95 % | DIASTOLIC BLOOD PRESSURE: 81 MMHG | RESPIRATION RATE: 20 BRPM | TEMPERATURE: 98.2 F | WEIGHT: 178 LBS | SYSTOLIC BLOOD PRESSURE: 120 MMHG | BODY MASS INDEX: 28.61 KG/M2

## 2020-02-23 PROCEDURE — 99283 EMERGENCY DEPT VISIT LOW MDM: CPT

## 2020-02-23 PROCEDURE — 94640 AIRWAY INHALATION TREATMENT: CPT

## 2020-02-23 PROCEDURE — 71046 X-RAY EXAM CHEST 2 VIEWS: CPT

## 2020-02-23 PROCEDURE — 6370000000 HC RX 637 (ALT 250 FOR IP): Performed by: EMERGENCY MEDICINE

## 2020-02-23 RX ORDER — GUAIFENESIN AND CODEINE PHOSPHATE 100; 10 MG/5ML; MG/5ML
5 SOLUTION ORAL 3 TIMES DAILY PRN
Qty: 120 ML | Refills: 0 | Status: SHIPPED | OUTPATIENT
Start: 2020-02-23 | End: 2020-03-02

## 2020-02-23 RX ORDER — IPRATROPIUM BROMIDE AND ALBUTEROL SULFATE 2.5; .5 MG/3ML; MG/3ML
1 SOLUTION RESPIRATORY (INHALATION) ONCE
Status: COMPLETED | OUTPATIENT
Start: 2020-02-23 | End: 2020-02-23

## 2020-02-23 RX ORDER — AMOXICILLIN 875 MG/1
875 TABLET, COATED ORAL 2 TIMES DAILY
COMMUNITY

## 2020-02-23 RX ADMIN — IPRATROPIUM BROMIDE AND ALBUTEROL SULFATE 1 AMPULE: .5; 3 SOLUTION RESPIRATORY (INHALATION) at 01:29

## 2020-02-23 NOTE — ED PROVIDER NOTES
Triage Chief Complaint:   Nasal Congestion (Patient arrives to ED with c/o cough, chest and head congestion, ear fullness was dx with sinus infection and was prescribed amoxicillin BID started it 4 days ago, capmist) and Cough    [place]:  Mel Snellen is a 64 y.o. male that presents with sinus congestion and cough. He has been ill for about a week. He went to urgent care and was put on Augmentin. He is using Flonase and an inhaler. He was given a short course of prednisone. Although this does not seem to be helping. He has sinus congestion and pressure. No fever. He feels like he has congestion in his chest.  He is coughing and not bringing up any phlegm. He has had pneumonia in the past.  No history of asthma. He does not smoke. Him seem to be getting worse so he came to emergency department tonight. He denies vomiting or diarrhea. He does have chronic abdominal pain. No flank pain or urinary symptoms. No history of atherosclerotic heart disease. He does have dysrhythmias from time to time. ROS:  General:  No fevers, chills with weakness.   Eyes:  No recent vison changes, no discharge  ENT: Sore throat, sinus pressure and nasal congestion, no hearing changes  Cardiovascular:  No chest pain, no palpitations  Respiratory:  No shortness of breath, cough, no wheezing  Gastrointestinal: Longstanding abdominal discomfort, no nausea, no vomiting, no diarrhea  Musculoskeletal:  No muscle pain, no joint pain  Skin:  No rash, no pruritis, no easy bruising  Neurologic:  No speech problems, no headache, no extremity numbness, no extremity tingling, no extremity weakness  Psychiatric:  No anxiety  Genitourinary:  No dysuria, no hematuria  Endocrine:  No unexpected weight gain, no unexpected weight loss  Extremities:  no edema, no pain    Past Medical History:   Diagnosis Date    Anxiety     \"Severe Anxiety Problems\"    Arthritis     \"Hands, Back, Right Hip\"    Arthritis of right hip 3/26/2019     phone: Not on file     Gets together: Not on file     Attends Shinto service: Not on file     Active member of club or organization: Not on file     Attends meetings of clubs or organizations: Not on file     Relationship status: Not on file    Intimate partner violence:     Fear of current or ex partner: Not on file     Emotionally abused: Not on file     Physically abused: Not on file     Forced sexual activity: Not on file   Other Topics Concern    Not on file   Social History Narrative    Do you donate blood or plasma? No    Caffeine intake? None    Advance directive? No    Is blood transfusion acceptable in an emergency? yes     No current facility-administered medications for this encounter. Current Outpatient Medications   Medication Sig Dispense Refill    amoxicillin (AMOXIL) 875 MG tablet Take 875 mg by mouth 2 times daily      Pseudoephedrine-DM-GG (CAPMIST DM PO) Take by mouth      acetaminophen (TYLENOL) 325 MG tablet Take 650 mg by mouth every 6 hours as needed for Pain      Albuterol Sulfate (VENTOLIN HFA IN) Inhale into the lungs as needed      fluticasone (FLONASE) 50 MCG/ACT nasal spray by Each Nare route as needed for Rhinitis       pantoprazole (PROTONIX) 40 MG tablet Take 40 mg by mouth every morning       famotidine (PEPCID) 20 MG tablet Take 1 tablet by mouth 2 times daily (Patient taking differently: Take 20 mg by mouth every morning ) 60 tablet 3    dicyclomine (BENTYL) 10 MG capsule Take 1 capsule by mouth 3 times daily as needed (Abdominal pain) (Patient taking differently: Take 10 mg by mouth every morning ) 15 capsule 3    LORazepam (ATIVAN) 1 MG tablet Take 1 mg by mouth 3 times daily. Laura Shofatuma zolpidem (AMBIEN) 10 MG tablet Take 10 mg by mouth nightly as needed for Sleep.  Laura Shofatuma cetirizine (ZYRTEC) 10 MG tablet Take 10 mg by mouth daily       fenofibrate (TRICOR) 54 MG tablet Take 54 mg by mouth every morning s [facility] pharmacy: Please dispense generic fenofibrate

## 2020-12-15 ENCOUNTER — HOSPITAL ENCOUNTER (EMERGENCY)
Age: 60
Discharge: HOME | End: 2020-12-15
Attending: EMERGENCY MEDICINE
Payer: MEDICAID

## 2020-12-15 VITALS
RESPIRATION RATE: 20 BRPM | TEMPERATURE: 97.7 F | HEART RATE: 117 BPM | HEIGHT: 65 IN | WEIGHT: 175 LBS | DIASTOLIC BLOOD PRESSURE: 108 MMHG | OXYGEN SATURATION: 96 % | BODY MASS INDEX: 29.16 KG/M2 | SYSTOLIC BLOOD PRESSURE: 157 MMHG

## 2020-12-15 PROCEDURE — 6370000000 HC RX 637 (ALT 250 FOR IP): Performed by: EMERGENCY MEDICINE

## 2020-12-15 PROCEDURE — 99284 EMERGENCY DEPT VISIT MOD MDM: CPT

## 2020-12-15 RX ORDER — LORAZEPAM 1 MG/1
1 TABLET ORAL ONCE
Status: COMPLETED | OUTPATIENT
Start: 2020-12-15 | End: 2020-12-15

## 2020-12-15 RX ADMIN — LORAZEPAM 1 MG: 1 TABLET ORAL at 17:15

## 2020-12-15 ASSESSMENT — PAIN DESCRIPTION - PROGRESSION: CLINICAL_PROGRESSION: GRADUALLY WORSENING

## 2020-12-15 ASSESSMENT — PAIN DESCRIPTION - DESCRIPTORS: DESCRIPTORS: CONSTANT

## 2020-12-15 ASSESSMENT — PAIN DESCRIPTION - PAIN TYPE: TYPE: CHRONIC PAIN

## 2020-12-15 ASSESSMENT — PAIN DESCRIPTION - ORIENTATION: ORIENTATION: RIGHT

## 2020-12-15 ASSESSMENT — PAIN SCALES - GENERAL: PAINLEVEL_OUTOF10: 8

## 2020-12-15 ASSESSMENT — PAIN DESCRIPTION - FREQUENCY: FREQUENCY: CONTINUOUS

## 2020-12-15 ASSESSMENT — PAIN DESCRIPTION - ONSET: ONSET: ON-GOING

## 2020-12-15 ASSESSMENT — PAIN DESCRIPTION - LOCATION: LOCATION: HIP

## 2020-12-15 NOTE — ED PROVIDER NOTES
Triage Chief Complaint:   Medication Refill    [place]:  Joi Doan is a 64 y.o. male that presents to the ED complaining that he is in withdrawal.  The patient is on Ativan a milligram 3 times daily has been out of for months he last filled the prescription on the 18th last month. He should have 3 and half days left he has been having a lot of pain is been overusing the meds for. He sitting in a chair with a slight tremor to his hands he is awake alert forward thinking not diaphoretic is mildly hypertensive. He did not try to call his doctor states he can get it filled within another day or so he just wanted something to hold him over. He is not purposely abused him he is not depressed he is not sad or suicidal.    Past Medical History:   Diagnosis Date    Anxiety     \"Severe Anxiety Problems\"    Arthritis     \"Hands, Back, Right Hip\"    Arthritis of right hip 3/26/2019    Dr Rudy Collet    Back pain     \"All The Time\"    Depression     Diabetes mellitus ([facility] [address] Dx 2000's    Environmental allergies     GERD (gastroesophageal reflux disease)     Glaucoma     H/O cardiovascular stress test 8/08, 12/3/13    12/3/13 result:  Normal perfusion in the distribution of all coronaries, normal LV size and function, EF 61%.  H/O echocardiogram 9/15/17,4/15/08    Mild concentric LVH with normal systolic function. EF 50-55% Trace TR  No evidence of pericardial effusion.  Hiatal hernia     Hiatal hernia     History of cardiac monitoring 09/18/2017    Conclusion: Findings suggesting normal sinus rhythm with physiologic heart rate variations and symptoms of palpitations during normal rate and rhythm and during mild degree of sinus tachycardia.      Hyperlipidemia     Hypertension     Liver disease     Missing teeth, acquired     states \"all my teeth are rotting away\"    PAF [facility]paroxysmal atrial fibrillation) ([facility])     follows with Dr Opal Majano Panic attacks     Pneumonia 1990's    \"Two Times\"    JULIOCESAR (postoperative nausea and vomiting)     Prolonged emergence from general anesthesia     'trouble waking me up after gallbladders surgery but I have anxiety so bad\"    Shortness of breath     Sinusitis     Sleep apnea     \"had sleep study over 2 yrs ago- suppose to wear cpap but makes me choke\"    Tobacco abuse     Wears dentures     Partial Upper--doesn't wear it    Wears glasses      Past Surgical History:   Procedure Laterality Date    CHOLECYSTECTOMY  2/18/13    Laprascopic    COLONOSCOPY  06/2018    One Polyp Removed    DENTAL SURGERY      Teeth Extracted In Past    ENDOSCOPY, COLON, DIAGNOSTIC  Last Done 1-13    FRACTURE SURGERY Left     Broken Left Arm, \"They Put Me Under To Reset It\"    HIATAL HERNIA REPAIR      HIP SURGERY Right 1978  and 1982    OTHER SURGICAL HISTORY Right 12/13/2016    Right hip steroid injection    OTHER SURGICAL HISTORY      \"put me asleep for BRAVO test- inserted chip in my esophagus\"    LA OFFICE/OUTPT VISIT,PROCEDURE ONLY N/A 11/2/2018    NISSEN FUNDOPLICATION LAPAROSCOPIC ROBOTIC performed by Shaji Cardozo MD at [address] [place] [address]  2010    SINUS SURGERY  07/2014    UPPER GASTROINTESTINAL ENDOSCOPY       Family History   Problem Relation Age of Onset    Other Mother         \"Thyroid Problems\"    Arthritis Mother     Anxiety Disorder Mother     Depression Mother     Breast Cancer Mother     High Blood Pressure Brother     Bipolar Disorder Brother     Diabetes Sister     Vision Loss Sister         \"Eye Problems\"    Cancer Sister         colon    Early Death Sister     Depression Sister     Bipolar Disorder Sister     Mental Illness Son         \"Anxiety\"    Learning Disabilities Son         \"Autistic\"    Mental Illness Daughter         \"Anxiety\"    Asthma Daughter     Mental Illness Daughter         \"Anxiety\"     Social History     Socioeconomic History    Marital status: Single     Spouse name: Not on file    Number of children: Not on file Pain      Albuterol Sulfate (VENTOLIN HFA IN) Inhale into the lungs as needed      fluticasone (FLONASE) 50 MCG/ACT nasal spray by Each Nare route as needed for Rhinitis       pantoprazole (PROTONIX) 40 MG tablet Take 40 mg by mouth every morning       famotidine (PEPCID) 20 MG tablet Take 1 tablet by mouth 2 times daily (Patient taking differently: Take 20 mg by mouth every morning ) 60 tablet 3    dicyclomine (BENTYL) 10 MG capsule Take 1 capsule by mouth 3 times daily as needed (Abdominal pain) (Patient taking differently: Take 10 mg by mouth every morning ) 15 capsule 3    LORazepam (ATIVAN) 1 MG tablet Take 1 mg by mouth 3 times daily. Timmy Allen zolpidem (AMBIEN) 10 MG tablet Take 10 mg by mouth nightly as needed for Sleep. Timmy Fields cetirizine (ZYRTEC) 10 MG tablet Take 10 mg by mouth daily       fenofibrate (TRICOR) 54 MG tablet Take 54 mg by mouth every morning [facility] pharmacy: Please dispense generic fenofibrate unless prescriber denote        Allergies   Allergen Reactions    Aspirin Hives and Swelling    Bactrim [Sulfamethoxazole-Trimethoprim] Shortness Of Breath, Itching and Rash    Benadryl [Diphenhydramine] Anxiety    Bupropion Swelling    Cefdinir Swelling    Cefuroxime Axetil Swelling     \"Swelling Of Lips And Tongue\"    Ciprofloxacin Swelling    Clarithromycin Swelling    Doxycycline Hyclate Anaphylaxis    Eliquis [Apixaban]      \"Numbness In Fingers And Toes\"    Motrin [Ibuprofen]      \"My Chest Hurts\"    Prilosec [Omeprazole]      \"Stomach Pain\"    Sulfa Antibiotics Shortness Of Breath, Itching and Rash    Vistaril [Hydroxyzine Hcl] Anxiety         ROS:    Review of Systems   Musculoskeletal: Positive for gait problem and joint swelling (R hip pain ). Psychiatric/Behavioral: Positive for agitation. All other systems reviewed and are negative.       Nursing Notes Reviewed    Physical Exam:  ED Triage Vitals [12/15/20 1704]   Enc Vitals Group      BP (!) 157/108      Pulse 117 Resp 20      Temp 97.7 °F (36.5 °C)      Temp Source Oral      SpO2 96 %      Weight 175 lb (79.4 kg)      Height 5' 5\" (1.651 m)      Head Circumference       Peak Flow       Pain Score       Pain Loc       Pain Edu? Excl. in [address]? Physical Exam  Vitals signs and nursing note reviewed. Exam conducted with a chaperone present. Constitutional:       Appearance: He is well-developed. HENT:      Head: Normocephalic and atraumatic. Right Ear: External ear normal.      Left Ear: External ear normal.   Eyes:      General: No scleral icterus. Right eye: No discharge. Left eye: No discharge. Conjunctiva/sclera: Conjunctivae normal.      Pupils: Pupils are equal, round, and reactive to light. Neck:      Musculoskeletal: Normal range of motion and neck supple. Thyroid: No thyromegaly. Vascular: No JVD. Trachea: No tracheal deviation. Cardiovascular:      Rate and Rhythm: Normal rate and regular rhythm. Heart sounds: Normal heart sounds. No murmur. No friction rub. No gallop. Pulmonary:      Effort: Pulmonary effort is normal. No respiratory distress. Breath sounds: Normal breath sounds. No stridor. No wheezing or rales. Chest:      Chest wall: No tenderness. Abdominal:      General: Bowel sounds are normal. There is no distension. Palpations: Abdomen is soft. There is no mass. Tenderness: There is no abdominal tenderness. There is no guarding or rebound. Hernia: No hernia is present. Musculoskeletal: Normal range of motion. General: No tenderness or deformity. Lymphadenopathy:      Cervical: No cervical adenopathy. Skin:     General: Skin is warm and dry. Coloration: Skin is not pale. Findings: No erythema or rash. Neurological:      Mental Status: He is alert and oriented to person, place, and time. GCS: GCS eye subscore is 4. GCS verbal subscore is 5. GCS motor subscore is 6.       Cranial Nerves: Cranial nerves are intact. No cranial nerve deficit. Sensory: Sensation is intact. No sensory deficit. Motor: Tremor present. Coordination: Coordination is intact. Gait: Gait is intact. Deep Tendon Reflexes: Reflexes are normal and symmetric. Reflexes normal.   Psychiatric:         Speech: Speech normal.         Behavior: Behavior normal.         Thought Content: Thought content normal.         Judgment: Judgment normal.         I have reviewed and interpreted all of the currently available lab results from this visit (ifapplicable):  No results found for this visit on 12/15/20. Radiographs (if obtained):  [] The following radiograph wasinterpreted by myself in the absence of a radiologist:   [] Radiologist's Report Reviewed:  No orders to display         EKG (if obtained): (All EKG's are interpreted by myself in the absence of a cardiologist)    Chart review shows recent radiographs:  No results found. MDM:      Patient is in minor benzodiazepine withdrawal.  He was forward thinking he was given 1 dose in the ED informed he should call his doctor for further adjustments titration highly recommend that he decrease the dose and eventually this should not be a chronic medicine. In the event he does have underlying anxiety psychotherapy is more beneficial and other alternative medicines and long-term benzodiazepines. My typical dicussion, presentation, and considerations for this patients' chief complaint, diagnosis, differential diagnosis, medications, medication use, medication safety and medication interactions have been explained and outlined to this patient for this patient encounter. I have stressed need for follow up and reexamination for this encounter and or return to the emergency department if any changes or any concern. I have discussed the findings of today's workup with the patient and present family members and have addressed their questions and concerns.  Important warning signs as well as new or worsening symptoms which would necessitate immediate return to the ED were discussed. The plan is to discharge from the ED at this time, and the patient is in stable condition. The patient acknowledged understanding is agreeable with this plan. The patient and/or family and I have discussed the diagnosis and risks, and we agree with discharging home to follow-up with their primary care, specialist or referral doctor. Questions addressed. Disposition and follow-up agreed upon. Specific discharge instructions explained. We have discussed the symptoms which are most concerning that necessitate immediate return. We also discussed returning to the Emergency Department immediately if new or worsening symptoms occur. Clinical Impression:  1. Benzodiazepine withdrawal without complication ([address]    2. Overuse of medication      Disposition referral (if applicable):  Darline Roberson MD  [address]    Schedule an appointment as soon as possible for a visit in 1 day      Disposition medications (if applicable):  New Prescriptions    No medications on file           Claudio Kay DO, FACEP      Comment: Please note this report has been produced using speech recognition software and maycontain errors related to that system including errors in grammar, punctuation, and spelling, as well as words and phrases that may be inappropriate. If there are any questions or concerns please feel free to contact thedictating provider for clarification.         Allie Theodore DO  12/15/20 3741

## 2020-12-15 NOTE — ED TRIAGE NOTES
Pt arrival via walk in. Pt he ran out of ativan script, needs refill. Pt alert and oriented x4, ambulatory to triage, skin pink warm dry, respirations even and unlabored.

## 2021-01-02 ENCOUNTER — HOSPITAL ENCOUNTER (OUTPATIENT)
Age: 61
Discharge: HOME | End: 2021-01-02
Payer: MEDICAID

## 2021-01-02 LAB
ALT SERPL-CCNC: 46 U/L (ref 10–40)
ANION GAP SERPL CALCULATED.3IONS-SCNC: 12 MMOL/L (ref 4–16)
BASOPHILS ABSOLUTE: 0.1 K/CU MM
BASOPHILS RELATIVE PERCENT: 1.6 % (ref 0–1)
BUN BLDV-MCNC: 24 MG/DL (ref 6–23)
CALCIUM SERPL-MCNC: 9.6 MG/DL (ref 8.3–10.6)
CHLORIDE BLD-SCNC: 104 MMOL/L (ref 99–110)
CHOLESTEROL, FASTING: 186 MG/DL
CO2: 24 MMOL/L (ref 21–32)
CREAT SERPL-MCNC: 1 MG/DL (ref 0.9–1.3)
DIFFERENTIAL TYPE: ABNORMAL
EOSINOPHILS ABSOLUTE: 0.2 K/CU MM
EOSINOPHILS RELATIVE PERCENT: 3.9 % (ref 0–3)
ESTIMATED AVERAGE GLUCOSE: 194 MG/DL
GFR AFRICAN AMERICAN: >60 ML/MIN/1.73M2
GFR NON-AFRICAN AMERICAN: >60 ML/MIN/1.73M2
GLUCOSE BLD-MCNC: 143 MG/DL (ref 70–99)
HBA1C MFR BLD: 8.4 % (ref 4.2–6.3)
HCT VFR BLD CALC: 54.2 % (ref 42–52)
HDLC SERPL-MCNC: 35 MG/DL
HEMOGLOBIN: 18.1 GM/DL (ref 13.5–18)
IMMATURE NEUTROPHIL %: 0.2 % (ref 0–0.43)
LDL CHOLESTEROL DIRECT: 127 MG/DL
LYMPHOCYTES ABSOLUTE: 1.9 K/CU MM
LYMPHOCYTES RELATIVE PERCENT: 31.2 % (ref 24–44)
MCH RBC QN AUTO: 30.6 PG (ref 27–31)
MCHC RBC AUTO-ENTMCNC: 33.4 % (ref 32–36)
MCV RBC AUTO: 91.7 FL (ref 78–100)
MONOCYTES ABSOLUTE: 0.4 K/CU MM
MONOCYTES RELATIVE PERCENT: 7.2 % (ref 0–4)
PDW BLD-RTO: 12.8 % (ref 11.7–14.9)
PLATELET # BLD: 180 K/CU MM (ref 140–440)
PMV BLD AUTO: 9.4 FL (ref 7.5–11.1)
POTASSIUM SERPL-SCNC: 4.1 MMOL/L (ref 3.5–5.1)
PROSTATE SPECIFIC ANTIGEN: 1.18 NG/ML (ref 0–4)
RBC # BLD: 5.91 M/CU MM (ref 4.6–6.2)
SEGMENTED NEUTROPHILS ABSOLUTE COUNT: 3.4 K/CU MM
SEGMENTED NEUTROPHILS RELATIVE PERCENT: 55.9 % (ref 36–66)
SODIUM BLD-SCNC: 140 MMOL/L (ref 135–145)
TOTAL IMMATURE NEUTOROPHIL: 0.01 K/CU MM
TRIGLYCERIDE, FASTING: 182 MG/DL
TSH HIGH SENSITIVITY: 1.62 UIU/ML (ref 0.27–4.2)
WBC # BLD: 6.2 K/CU MM (ref 4–10.5)

## 2021-01-02 PROCEDURE — 84460 ALANINE AMINO (ALT) (SGPT): CPT

## 2021-01-02 PROCEDURE — 80048 BASIC METABOLIC PNL TOTAL CA: CPT

## 2021-01-02 PROCEDURE — 83036 HEMOGLOBIN GLYCOSYLATED A1C: CPT

## 2021-01-02 PROCEDURE — G0103 PSA SCREENING: HCPCS

## 2021-01-02 PROCEDURE — 80061 LIPID PANEL: CPT

## 2021-01-02 PROCEDURE — 84443 ASSAY THYROID STIM HORMONE: CPT

## 2021-01-02 PROCEDURE — 85025 COMPLETE CBC W/AUTO DIFF WBC: CPT

## 2021-01-02 PROCEDURE — 36415 COLL VENOUS BLD VENIPUNCTURE: CPT

## 2021-04-09 ENCOUNTER — APPOINTMENT (OUTPATIENT)
Dept: GENERAL RADIOLOGY | Age: 61
End: 2021-04-09
Payer: MEDICAID

## 2021-04-09 ENCOUNTER — HOSPITAL ENCOUNTER (EMERGENCY)
Age: 61
Discharge: HOME | End: 2021-04-09
Attending: EMERGENCY MEDICINE
Payer: MEDICAID

## 2021-04-09 VITALS
WEIGHT: 190 LBS | SYSTOLIC BLOOD PRESSURE: 156 MMHG | RESPIRATION RATE: 18 BRPM | TEMPERATURE: 99.1 F | DIASTOLIC BLOOD PRESSURE: 95 MMHG | HEART RATE: 98 BPM | BODY MASS INDEX: 30.53 KG/M2 | OXYGEN SATURATION: 97 % | HEIGHT: 66 IN

## 2021-04-09 DIAGNOSIS — R05.9 COUGH: Primary | ICD-10-CM

## 2021-04-09 DIAGNOSIS — J06.9 ACUTE UPPER RESPIRATORY INFECTION: ICD-10-CM

## 2021-04-09 PROCEDURE — 71045 X-RAY EXAM CHEST 1 VIEW: CPT

## 2021-04-09 PROCEDURE — U0003 INFECTIOUS AGENT DETECTION BY NUCLEIC ACID (DNA OR RNA); SEVERE ACUTE RESPIRATORY SYNDROME CORONAVIRUS 2 (SARS-COV-2) (CORONAVIRUS DISEASE [COVID-19]), AMPLIFIED PROBE TECHNIQUE, MAKING USE OF HIGH THROUGHPUT TECHNOLOGIES AS DESCRIBED BY CMS-2020-01-R: HCPCS

## 2021-04-09 PROCEDURE — 99284 EMERGENCY DEPT VISIT MOD MDM: CPT

## 2021-04-09 PROCEDURE — U0005 INFEC AGEN DETEC AMPLI PROBE: HCPCS

## 2021-04-09 RX ORDER — BROMPHENIRAMINE MALEATE, PSEUDOEPHEDRINE HYDROCHLORIDE, AND DEXTROMETHORPHAN HYDROBROMIDE 2; 30; 10 MG/5ML; MG/5ML; MG/5ML
5 SYRUP ORAL 4 TIMES DAILY PRN
Qty: 120 ML | Refills: 0 | Status: SHIPPED | OUTPATIENT
Start: 2021-04-09 | End: 2021-04-19

## 2021-04-09 RX ORDER — BENZONATATE 100 MG/1
200 CAPSULE ORAL 3 TIMES DAILY PRN
Qty: 21 CAPSULE | Refills: 0 | Status: SHIPPED | OUTPATIENT
Start: 2021-04-09 | End: 2021-04-16

## 2021-04-09 ASSESSMENT — PAIN SCALES - GENERAL: PAINLEVEL_OUTOF10: 10

## 2021-04-09 ASSESSMENT — PAIN DESCRIPTION - DESCRIPTORS: DESCRIPTORS: ACHING;DISCOMFORT

## 2021-04-09 ASSESSMENT — PAIN DESCRIPTION - FREQUENCY: FREQUENCY: CONTINUOUS

## 2021-04-09 NOTE — ED PROVIDER NOTES
Medical History:   Diagnosis Date    Anxiety     \"Severe Anxiety Problems\"    Arthritis     \"Hands, Back, Right Hip\"    Arthritis of right hip 3/26/2019    Dr Maria T Lizama    Back pain     \"All The Time\"    Depression     Diabetes mellitus ([address] Dx 2000's    Environmental allergies     GERD (gastroesophageal reflux disease)     Glaucoma     H/O cardiovascular stress test 8/08, 12/3/13    12/3/13 result:  Normal perfusion in the distribution of all coronaries, normal LV size and function, EF 61%.  H/O echocardiogram 9/15/17,4/15/08    Mild concentric LVH with normal systolic function. EF 50-55% Trace TR  No evidence of pericardial effusion.  Hiatal hernia     Hiatal hernia     History of cardiac monitoring 09/18/2017    Conclusion: Findings suggesting normal sinus rhythm with physiologic heart rate variations and symptoms of palpitations during normal rate and rhythm and during mild degree of sinus tachycardia.      Hyperlipidemia     Hypertension     Liver disease     Missing teeth, acquired     states \"all my teeth are rotting away\"    PAF [facility]oxysmal [facility] fibrillation) ([facility])     follows with Dr Nicole Galvan Panic attacks     Pneumonia 1990's    \"Two Times\"    PONV (postoperative nausea and vomiting)     Prolonged emergence from general anesthesia     'trouble waking me up after gallbladders surgery but I have anxiety so bad\"    Shortness of breath     Sinusitis     Sleep apnea     \"had sleep study over 2 yrs ago- suppose to wear cpap but makes me choke\"    Tobacco abuse     Wears dentures     Partial Upper--doesn't wear it    Wears glasses      Past Surgical History:   Procedure Laterality Date    CHOLECYSTECTOMY  2/18/13    Laprascopic    COLONOSCOPY  06/2018    One Polyp Removed    DENTAL SURGERY      Teeth Extracted In Past    ENDOSCOPY, COLON, DIAGNOSTIC  Last Done 1-13    FRACTURE SURGERY Left     Broken Left Arm, \"They Put Me Under To Reset It\"    HIATAL HERNIA REPAIR      HIP SURGERY Right   and 1982    OTHER SURGICAL HISTORY Right 2016    Right hip steroid injection    OTHER SURGICAL HISTORY      \"put me asleep for BRAVO test- inserted chip in my esophagus\"    AL OFFICE/OUTPT VISIT,PROCEDURE ONLY N/A 2018    NISSEN FUNDOPLICATION LAPAROSCOPIC ROBOTIC performed by Laura Vásquez MD at [address] [place] [address]  2010    SINUS SURGERY  2014    UPPER GASTROINTESTINAL ENDOSCOPY       Family History   Problem Relation Age of Onset    Other Mother         \"Thyroid Problems\"    Arthritis Mother     Anxiety Disorder Mother     Depression Mother     Breast Cancer Mother     High Blood Pressure Brother     Bipolar Disorder Brother     Diabetes Sister     Vision Loss Sister         \"Eye Problems\"    Cancer Sister         colon    Early Death Sister     Depression Sister     Bipolar Disorder Sister     Mental Illness Son         \"Anxiety\"    Learning Disabilities Son         \"Autistic\"    Mental Illness Daughter         \"Anxiety\"    Asthma Daughter     Mental Illness Daughter         \"Anxiety\"     Social History     Socioeconomic History    Marital status: Single     Spouse name: Not on file    Number of children: Not on file    Years of education: Not on file    Highest education level: Not on file   Occupational History    Occupation: unemployed   Social Needs    Financial resource strain: Not on file    Food insecurity     Worry: Not on file     Inability: Not on file   TBS needs     Medical: Not on file     Non-medical: Not on file   Tobacco Use    Smoking status: Former Smoker     Packs/day: 0.50     Years: 8.00     Pack years: 4.00     Types: Cigarettes, Cigars     Start date:      Quit date:      Years since quittin.2    Smokeless tobacco: Current User     Types: Snuff   Substance and Sexual Activity    Alcohol use: No    Drug use: No    Sexual activity: Yes     Partners: Female   Lifestyle    Physical activity capsule 3    LORazepam (ATIVAN) 1 MG tablet Take 1 mg by mouth 3 times daily. Char Elder zolpidem (AMBIEN) 10 MG tablet Take 10 mg by mouth nightly as needed for Sleep. Char Elder cetirizine (ZYRTEC) 10 MG tablet Take 10 mg by mouth daily       fenofibrate (TRICOR) 54 MG tablet Take 54 mg by mouth every morning [facility] pharmacy: Please dispense generic fenofibrate unless prescriber denote        Allergies   Allergen Reactions    Aspirin Hives and Swelling    Bactrim [Sulfamethoxazole-Trimethoprim] Shortness Of Breath, Itching and Rash    Benadryl [Diphenhydramine] Anxiety    Bupropion Swelling    Cefdinir Swelling    Cefuroxime Axetil Swelling     \"Swelling Of Lips And Tongue\"    Ciprofloxacin Swelling    Clarithromycin Swelling    Doxycycline Hyclate Anaphylaxis    Eliquis [Apixaban]      \"Numbness In Fingers And Toes\"    Motrin [Ibuprofen]      \"My Chest Hurts\"    Prilosec [Omeprazole]      \"Stomach Pain\"    Sulfa Antibiotics Shortness Of Breath, Itching and Rash    Vistaril [Hydroxyzine Hcl] Anxiety       Nursing Notes Reviewed    Physical Exam:  ED Triage Vitals [04/09/21 1644]   Enc Vitals Group      BP (!) 156/95      Pulse 98      Resp 18      Temp 99.1 °F (37.3 °C)      Temp Source Oral      SpO2 97 %      Weight 190 lb (86.2 kg)      Height 5' 6\" (1.676 m)      Head Circumference       Peak Flow       Pain Score       Pain Loc       Pain Edu? Excl. in [address]? GENERAL APPEARANCE: Awake and alert. Cooperative. No acute distress. Nontoxic in appearance  HEAD: Normocephalic. Atraumatic. EYES: EOM's grossly intact. Sclera anicteric. ENT: Tolerates saliva. No trismus. Pharynx is erythematous with posterior pharyngeal drainage  NECK: Supple. Trachea midline. CARDIO: RRR. Radial pulse 2+. LUNGS: Respirations unlabored. CTAB. No wheezes rales or rhonchi  ABDOMEN: Soft. Non-distended. Non-tender. EXTREMITIES: No acute deformities. SKIN: Warm and dry.    NEUROLOGICAL: No gross facial drooping. Moves all 4 extremities spontaneously. PSYCHIATRIC: Normal mood. Labs:  No results found for this visit on 04/09/21. Radiographs (if obtained):  [] The following radiograph was interpreted by myself in the absence of a radiologist:  [x] Radiologist's Report reviewed at time of ED visit:  XR CHEST PORTABLE   Final Result   Unremarkable portable chest radiograph. ED Course and MDM:  Patient presents to the emergency department with cough and upper respiratory symptoms. He thinks it may be allergies. He is using Zyrtec. He has been using over-the-counter cough and decongestant medicines. His chest x-ray shows no evidence of pneumonia and he was most concerned that that was a problem. He has had no fever. He has been exposed to Covid but had a negative test 2 weeks ago. Patient will have a Covid test repeated here in the emergency department chest x-ray showed no evidence of pneumonia and antibiotics would not be restarted. He will be started on Bromfed-DM and Tessalon Perles. He is to follow-up with his primary caregiver in 1 week. The patient does have a prescription for Flonase that he needs to . He will be encouraged to do so. He is discharged stable condition and is instructed return for any problems or concerns. Final Impression:  1.  Cough    2. Acute upper respiratory infection      DISPOSITION Decision To Discharge    Patient referred to:  Prashant Rankin MD  [address] [place] [address]  [phone]    Schedule an appointment as soon as possible for a visit in 1 week  For follow up    Discharge medications:  New Prescriptions    BENZONATATE (TESSALON PERLES) 100 MG CAPSULE    Take 2 capsules by mouth 3 times daily as needed for Cough    BROMPHENIRAMINE-PSEUDOEPHEDRINE-DM 2-30-10 MG/5ML SYRUP    Take 5 mLs by mouth 4 times daily as needed for Congestion or Cough Pharmacist may substitute     (Please note that portions of this note may have been completed with a voice recognition program. Efforts were made to edit the dictations but occasionally words are mis-transcribed.)    Park Sorenson DO, [address] [facility]  Board certified in [address]  04/09/21 [address] [place] [address]

## 2021-04-09 NOTE — ED NOTES
Discharge instructions given to pt. Instructed that scripts were sent to [facility] and he needs to go there to pick them up. Pt instructed to quarantine until he gets his COVID results and they are negative. Instructed how to get on MyChart to check his results. Instructed to follow up with his family dr and to return to ER if any problems or concerns. Pt verbalizes understanding. Pt taken outside to do non rapid COVID swab.  Pt discharged ambulatory     Scar Boyd RN  04/09/21 [id number]

## 2021-04-10 LAB
SARS-COV-2: DETECTED
SOURCE: ABNORMAL

## 2021-04-12 ENCOUNTER — CARE COORDINATION (OUTPATIENT)
Dept: CARE COORDINATION | Age: 61
End: 2021-04-12

## 2021-04-12 NOTE — CARE COORDINATION
Person answering the phone states number is incorrect, he has been getting many messages for this person and asked this number be removed.  [phone]

## 2021-04-12 NOTE — CARE COORDINATION
Attempted outreach call for ED f/u and COVID-19 monitoring; left a HIPPA compliant VM with ACM call-back information on \"Nessa Ruiz\" VM. If no call back, will make another attempt. Michael Sam RN, BSN  Care Coordinator  Cell [phone]  Email [email or web address]    No future appointments.

## 2021-04-14 NOTE — CARE COORDINATION
Attempted outreach call for ED f/u and COVID-19 monitoring; no answer. No further outreach at this time. Chemo Landa RN, BSN  Care Coordinator  Cell [phone]  Email [email or web address]

## 2022-12-05 ENCOUNTER — HOSPITAL ENCOUNTER (OUTPATIENT)
Age: 62
Discharge: HOME | End: 2022-12-05
Payer: MEDICAID

## 2022-12-05 LAB
ALT SERPL-CCNC: 34 U/L (ref 10–40)
ANION GAP SERPL CALCULATED.3IONS-SCNC: 12 MMOL/L (ref 4–16)
BASOPHILS ABSOLUTE: 0.1 K/CU MM
BASOPHILS RELATIVE PERCENT: 1.3 % (ref 0–1)
BUN BLDV-MCNC: 19 MG/DL (ref 6–23)
CALCIUM SERPL-MCNC: 9.1 MG/DL (ref 8.3–10.6)
CHLORIDE BLD-SCNC: 102 MMOL/L (ref 99–110)
CHOLESTEROL, FASTING: 210 MG/DL
CO2: 22 MMOL/L (ref 21–32)
CREAT SERPL-MCNC: 0.7 MG/DL (ref 0.9–1.3)
DIFFERENTIAL TYPE: ABNORMAL
EOSINOPHILS ABSOLUTE: 0.3 K/CU MM
EOSINOPHILS RELATIVE PERCENT: 3.6 % (ref 0–3)
ESTIMATED AVERAGE GLUCOSE: 200 MG/DL
GFR SERPL CREATININE-BSD FRML MDRD: >60 ML/MIN/1.73M2
GLUCOSE FASTING: 169 MG/DL (ref 70–99)
HBA1C MFR BLD: 8.6 % (ref 4.2–6.3)
HCT VFR BLD CALC: 50.4 % (ref 42–52)
HDLC SERPL-MCNC: 38 MG/DL
HEMOGLOBIN: 17 GM/DL (ref 13.5–18)
IMMATURE NEUTROPHIL %: 0.3 % (ref 0–0.43)
LDL CHOLESTEROL CALCULATED: 134 MG/DL
LYMPHOCYTES ABSOLUTE: 1.9 K/CU MM
LYMPHOCYTES RELATIVE PERCENT: 24.6 % (ref 24–44)
MCH RBC QN AUTO: 31.5 PG (ref 27–31)
MCHC RBC AUTO-ENTMCNC: 33.7 % (ref 32–36)
MCV RBC AUTO: 93.3 FL (ref 78–100)
MONOCYTES ABSOLUTE: 0.6 K/CU MM
MONOCYTES RELATIVE PERCENT: 8 % (ref 0–4)
PDW BLD-RTO: 13.7 % (ref 11.7–14.9)
PLATELET # BLD: 128 K/CU MM (ref 140–440)
PMV BLD AUTO: 9.8 FL (ref 7.5–11.1)
POTASSIUM SERPL-SCNC: 4 MMOL/L (ref 3.5–5.1)
PROSTATE SPECIFIC ANTIGEN: 1.03 NG/ML (ref 0–4)
RBC # BLD: 5.4 M/CU MM (ref 4.6–6.2)
SEGMENTED NEUTROPHILS ABSOLUTE COUNT: 4.7 K/CU MM
SEGMENTED NEUTROPHILS RELATIVE PERCENT: 62.2 % (ref 36–66)
SODIUM BLD-SCNC: 136 MMOL/L (ref 135–145)
TOTAL IMMATURE NEUTOROPHIL: 0.02 K/CU MM
TRIGLYCERIDE, FASTING: 188 MG/DL
TSH HIGH SENSITIVITY: 1.25 UIU/ML (ref 0.27–4.2)
WBC # BLD: 7.5 K/CU MM (ref 4–10.5)

## 2022-12-05 PROCEDURE — 83036 HEMOGLOBIN GLYCOSYLATED A1C: CPT

## 2022-12-05 PROCEDURE — 80048 BASIC METABOLIC PNL TOTAL CA: CPT

## 2022-12-05 PROCEDURE — G0103 PSA SCREENING: HCPCS

## 2022-12-05 PROCEDURE — 84443 ASSAY THYROID STIM HORMONE: CPT

## 2022-12-05 PROCEDURE — 80061 LIPID PANEL: CPT

## 2022-12-05 PROCEDURE — 85025 COMPLETE CBC W/AUTO DIFF WBC: CPT

## 2022-12-05 PROCEDURE — 84460 ALANINE AMINO (ALT) (SGPT): CPT

## 2022-12-05 PROCEDURE — 36415 COLL VENOUS BLD VENIPUNCTURE: CPT

## 2023-05-13 ENCOUNTER — APPOINTMENT (OUTPATIENT)
Dept: ULTRASOUND IMAGING | Age: 63
End: 2023-05-13
Payer: MEDICAID

## 2023-05-13 ENCOUNTER — HOSPITAL ENCOUNTER (EMERGENCY)
Age: 63
Discharge: HOME | End: 2023-05-13
Attending: EMERGENCY MEDICINE
Payer: MEDICAID

## 2023-05-13 VITALS
DIASTOLIC BLOOD PRESSURE: 86 MMHG | TEMPERATURE: 98.2 F | RESPIRATION RATE: 18 BRPM | SYSTOLIC BLOOD PRESSURE: 146 MMHG | WEIGHT: 189 LBS | HEART RATE: 105 BPM | OXYGEN SATURATION: 96 % | BODY MASS INDEX: 31.49 KG/M2 | HEIGHT: 65 IN

## 2023-05-13 DIAGNOSIS — I82.411 DVT OF DEEP FEMORAL VEIN, RIGHT (HCC): Primary | ICD-10-CM

## 2023-05-13 DIAGNOSIS — R60.0 LEG EDEMA: ICD-10-CM

## 2023-05-13 LAB
ANION GAP SERPL CALCULATED.3IONS-SCNC: 10 MMOL/L (ref 4–16)
BASOPHILS ABSOLUTE: 0.1 K/CU MM
BASOPHILS RELATIVE PERCENT: 1.2 % (ref 0–1)
BUN SERPL-MCNC: 19 MG/DL (ref 6–23)
CALCIUM SERPL-MCNC: 9 MG/DL (ref 8.3–10.6)
CHLORIDE BLD-SCNC: 106 MMOL/L (ref 99–110)
CO2: 22 MMOL/L (ref 21–32)
CREAT SERPL-MCNC: 0.6 MG/DL (ref 0.9–1.3)
D DIMER: 882 NG/ML(DDU)
DIFFERENTIAL TYPE: ABNORMAL
EOSINOPHILS ABSOLUTE: 0.2 K/CU MM
EOSINOPHILS RELATIVE PERCENT: 4.1 % (ref 0–3)
GFR SERPL CREATININE-BSD FRML MDRD: >60 ML/MIN/1.73M2
GLUCOSE SERPL-MCNC: 252 MG/DL (ref 70–99)
HCT VFR BLD CALC: 46.6 % (ref 42–52)
HEMOGLOBIN: 15.3 GM/DL (ref 13.5–18)
IMMATURE NEUTROPHIL %: 0.2 % (ref 0–0.43)
LACTIC ACID, SEPSIS: 1.6 MMOL/L (ref 0.5–1.9)
LYMPHOCYTES ABSOLUTE: 1.2 K/CU MM
LYMPHOCYTES RELATIVE PERCENT: 20.9 % (ref 24–44)
MCH RBC QN AUTO: 31.7 PG (ref 27–31)
MCHC RBC AUTO-ENTMCNC: 32.8 % (ref 32–36)
MCV RBC AUTO: 96.5 FL (ref 78–100)
MONOCYTES ABSOLUTE: 0.5 K/CU MM
MONOCYTES RELATIVE PERCENT: 9.2 % (ref 0–4)
PDW BLD-RTO: 13.6 % (ref 11.7–14.9)
PLATELET # BLD: 99 K/CU MM (ref 140–440)
PMV BLD AUTO: 9.8 FL (ref 7.5–11.1)
POTASSIUM SERPL-SCNC: 3.7 MMOL/L (ref 3.5–5.1)
RBC # BLD: 4.83 M/CU MM (ref 4.6–6.2)
SEGMENTED NEUTROPHILS ABSOLUTE COUNT: 3.6 K/CU MM
SEGMENTED NEUTROPHILS RELATIVE PERCENT: 64.4 % (ref 36–66)
SODIUM BLD-SCNC: 138 MMOL/L (ref 135–145)
TOTAL CK: 87 IU/L (ref 38–174)
TOTAL IMMATURE NEUTOROPHIL: 0.01 K/CU MM
WBC # BLD: 5.6 K/CU MM (ref 4–10.5)

## 2023-05-13 PROCEDURE — 93971 EXTREMITY STUDY: CPT

## 2023-05-13 PROCEDURE — 80048 BASIC METABOLIC PNL TOTAL CA: CPT

## 2023-05-13 PROCEDURE — 85025 COMPLETE CBC W/AUTO DIFF WBC: CPT

## 2023-05-13 PROCEDURE — 83605 ASSAY OF LACTIC ACID: CPT

## 2023-05-13 PROCEDURE — 96374 THER/PROPH/DIAG INJ IV PUSH: CPT

## 2023-05-13 PROCEDURE — 6370000000 HC RX 637 (ALT 250 FOR IP): Performed by: EMERGENCY MEDICINE

## 2023-05-13 PROCEDURE — 6360000002 HC RX W HCPCS: Performed by: EMERGENCY MEDICINE

## 2023-05-13 PROCEDURE — 82550 ASSAY OF CK (CPK): CPT

## 2023-05-13 PROCEDURE — 85379 FIBRIN DEGRADATION QUANT: CPT

## 2023-05-13 PROCEDURE — 99284 EMERGENCY DEPT VISIT MOD MDM: CPT

## 2023-05-13 RX ORDER — HYDROCODONE BITARTRATE AND ACETAMINOPHEN 5; 325 MG/1; MG/1
1 TABLET ORAL EVERY 6 HOURS PRN
Qty: 10 TABLET | Refills: 0 | Status: SHIPPED | OUTPATIENT
Start: 2023-05-13 | End: 2023-05-16

## 2023-05-13 RX ORDER — FENTANYL CITRATE 50 UG/ML
50 INJECTION, SOLUTION INTRAMUSCULAR; INTRAVENOUS ONCE
Status: COMPLETED | OUTPATIENT
Start: 2023-05-13 | End: 2023-05-13

## 2023-05-13 RX ADMIN — RIVAROXABAN 15 MG: 15 TABLET, FILM COATED ORAL at 15:28

## 2023-05-13 RX ADMIN — FENTANYL CITRATE 50 MCG: 50 INJECTION, SOLUTION INTRAMUSCULAR; INTRAVENOUS at 13:30

## 2023-05-13 ASSESSMENT — PAIN DESCRIPTION - ORIENTATION: ORIENTATION: RIGHT

## 2023-05-13 ASSESSMENT — PAIN DESCRIPTION - LOCATION: LOCATION: LEG

## 2023-05-13 NOTE — ED NOTES
Patient discharging home, AVS reviewed with no questions at this time. Patient instrcuted to follow up per discharge instructions and to return for worsening symptoms. Respirations equal and unlabored, skin PWD.        Sarai Moore RN  05/13/23 1820

## 2023-05-13 NOTE — ED PROVIDER NOTES
note:    VL DUP LOWER EXTREMITY VENOUS RIGHT   Final Result   Addendum (preliminary) 1 of 1   ADDENDUM:   Findings reported to Dr. Lia Rodriguez at 12 p.m. [place] time. Final   Evidence for deep venous thrombosis, presumed acute. This is involving the   mid right femoral vein and popliteal vein. Questionable involvement below   the knee. RECOMMENDATIONS:   Unavailable             LABS:  Labs Reviewed   BASIC METABOLIC PANEL - Abnormal; Notable for the following components:       Result Value    Creatinine 0.6 (*)     Glucose 252 (*)     All other components within normal limits   CBC WITH AUTO DIFFERENTIAL - Abnormal; Notable for the following components:    MCH 31.7 (*)     Platelets 99 (*)     Lymphocytes % 20.9 (*)     Monocytes % 9.2 (*)     Eosinophils % 4.1 (*)     Basophils % 1.2 (*)     All other components within normal limits   D-DIMER, QUANTITATIVE - Abnormal; Notable for the following components:    D-Dimer, Quant 882 (*)     All other components within normal limits   LACTATE, SEPSIS   CK       All other labs were within normal range or not returned as of this dictation. MEDICAL DECISION MAKING   Current Code Status   Code Status: Prior     Medications   rivaroxaban (XARELTO) tablet 15 mg (has no administration in time range)   fentaNYL (SUBLIMAZE) injection 50 mcg (50 mcg IntraVENous Given 5/13/23 1330)   rivaroxaban (XARELTO) tablet 15 mg (15 mg Oral Given 5/13/23 1528)                                   CIWA Assessment  BP: (!) 146/86  Pulse: (!) 105                 MDM  This is a 63 y.o. male who presents to the emergency department with worsening pain in his right lower leg. The patient says has been having worsening pain in his right lower leg in the posterior aspect of his right calf for the past 2 to 3 days.   Patient describes chronic pain in his right hip and says that he was advised to have a hip replacement by an orthopedic surgeon however he has been unable to have that surgery

## 2023-06-14 ENCOUNTER — HOSPITAL ENCOUNTER (OUTPATIENT)
Dept: INFUSION THERAPY | Age: 63
Discharge: HOME | End: 2023-06-14
Payer: MEDICAID

## 2023-06-14 PROBLEM — I82.411 ACUTE DEEP VEIN THROMBOSIS (DVT) OF FEMORAL VEIN OF RIGHT LOWER EXTREMITY (HCC): Status: ACTIVE | Noted: 2023-06-14

## 2023-06-14 PROCEDURE — 99211 OFF/OP EST MAY X REQ PHY/QHP: CPT

## 2023-07-28 ENCOUNTER — TELEPHONE (OUTPATIENT)
Dept: ONCOLOGY | Age: 63
End: 2023-07-28

## 2023-07-28 ENCOUNTER — HOSPITAL ENCOUNTER (OUTPATIENT)
Age: 63
Discharge: HOME | End: 2023-07-28
Payer: MEDICAID

## 2023-07-28 LAB
ALT SERPL-CCNC: 41 U/L (ref 10–40)
ANION GAP SERPL CALCULATED.3IONS-SCNC: 9 MMOL/L (ref 4–16)
BASOPHILS ABSOLUTE: 0.1 K/CU MM
BASOPHILS RELATIVE PERCENT: 1.4 % (ref 0–1)
BUN SERPL-MCNC: 18 MG/DL (ref 6–23)
CALCIUM SERPL-MCNC: 8.9 MG/DL (ref 8.3–10.6)
CHLORIDE BLD-SCNC: 106 MMOL/L (ref 99–110)
CHOLEST SERPL-MCNC: 196 MG/DL
CO2: 23 MMOL/L (ref 21–32)
CREAT SERPL-MCNC: 0.7 MG/DL (ref 0.9–1.3)
DIFFERENTIAL TYPE: ABNORMAL
EOSINOPHILS ABSOLUTE: 0.2 K/CU MM
EOSINOPHILS RELATIVE PERCENT: 2.6 % (ref 0–3)
ESTIMATED AVERAGE GLUCOSE: 169 MG/DL
GFR SERPL CREATININE-BSD FRML MDRD: >60 ML/MIN/1.73M2
GLUCOSE SERPL-MCNC: 171 MG/DL (ref 70–99)
HBA1C MFR BLD: 7.5 % (ref 4.2–6.3)
HCT VFR BLD CALC: 47.1 % (ref 42–52)
HDLC SERPL-MCNC: 59 MG/DL
HEMOGLOBIN: 15.6 GM/DL (ref 13.5–18)
IMMATURE NEUTROPHIL %: 0.2 % (ref 0–0.43)
LDLC SERPL CALC-MCNC: 114 MG/DL
LYMPHOCYTES ABSOLUTE: 1.7 K/CU MM
LYMPHOCYTES RELATIVE PERCENT: 28.7 % (ref 24–44)
MCH RBC QN AUTO: 32 PG (ref 27–31)
MCHC RBC AUTO-ENTMCNC: 33.1 % (ref 32–36)
MCV RBC AUTO: 96.7 FL (ref 78–100)
MONOCYTES ABSOLUTE: 0.5 K/CU MM
MONOCYTES RELATIVE PERCENT: 9 % (ref 0–4)
PDW BLD-RTO: 13.7 % (ref 11.7–14.9)
PLATELET # BLD: 101 K/CU MM (ref 140–440)
PMV BLD AUTO: 9.5 FL (ref 7.5–11.1)
POTASSIUM SERPL-SCNC: 4 MMOL/L (ref 3.5–5.1)
RBC # BLD: 4.87 M/CU MM (ref 4.6–6.2)
SEGMENTED NEUTROPHILS ABSOLUTE COUNT: 3.4 K/CU MM
SEGMENTED NEUTROPHILS RELATIVE PERCENT: 58.1 % (ref 36–66)
SODIUM BLD-SCNC: 138 MMOL/L (ref 135–145)
TOTAL IMMATURE NEUTOROPHIL: 0.01 K/CU MM
TRIGL SERPL-MCNC: 116 MG/DL
TSH SERPL DL<=0.005 MIU/L-ACNC: 2.08 UIU/ML (ref 0.27–4.2)
WBC # BLD: 5.9 K/CU MM (ref 4–10.5)

## 2023-07-28 PROCEDURE — 84460 ALANINE AMINO (ALT) (SGPT): CPT

## 2023-07-28 PROCEDURE — 83036 HEMOGLOBIN GLYCOSYLATED A1C: CPT

## 2023-07-28 PROCEDURE — 84443 ASSAY THYROID STIM HORMONE: CPT

## 2023-07-28 PROCEDURE — 36415 COLL VENOUS BLD VENIPUNCTURE: CPT

## 2023-07-28 PROCEDURE — 80048 BASIC METABOLIC PNL TOTAL CA: CPT

## 2023-07-28 PROCEDURE — 85025 COMPLETE CBC W/AUTO DIFF WBC: CPT

## 2023-07-28 PROCEDURE — 80061 LIPID PANEL: CPT

## 2023-07-28 NOTE — TELEPHONE ENCOUNTER
Patient called given time and prep for US to be done on 8/14/23 [address] [facility] arrival at 12:30 PM.

## 2023-08-14 ENCOUNTER — HOSPITAL ENCOUNTER (OUTPATIENT)
Dept: ULTRASOUND IMAGING | Age: 63
Discharge: HOME | End: 2023-08-14
Attending: INTERNAL MEDICINE
Payer: MEDICAID

## 2023-08-14 DIAGNOSIS — I82.411 ACUTE DEEP VEIN THROMBOSIS (DVT) OF FEMORAL VEIN OF RIGHT LOWER EXTREMITY (HCC): ICD-10-CM

## 2023-08-14 PROCEDURE — 93970 EXTREMITY STUDY: CPT

## 2023-08-23 ENCOUNTER — HOSPITAL ENCOUNTER (OUTPATIENT)
Dept: INFUSION THERAPY | Age: 63
Discharge: HOME | End: 2023-08-23
Payer: MEDICAID

## 2023-08-23 ENCOUNTER — OFFICE VISIT (OUTPATIENT)
Dept: ONCOLOGY | Age: 63
End: 2023-08-23
Payer: MEDICAID

## 2023-08-23 VITALS
HEIGHT: 65 IN | SYSTOLIC BLOOD PRESSURE: 155 MMHG | OXYGEN SATURATION: 99 % | RESPIRATION RATE: 16 BRPM | TEMPERATURE: 98.4 F | HEART RATE: 98 BPM | WEIGHT: 185.8 LBS | BODY MASS INDEX: 30.96 KG/M2 | DIASTOLIC BLOOD PRESSURE: 80 MMHG

## 2023-08-23 DIAGNOSIS — I82.411 ACUTE DEEP VEIN THROMBOSIS (DVT) OF FEMORAL VEIN OF RIGHT LOWER EXTREMITY (HCC): Primary | ICD-10-CM

## 2023-08-23 PROCEDURE — 4004F PT TOBACCO SCREEN RCVD TLK: CPT | Performed by: INTERNAL MEDICINE

## 2023-08-23 PROCEDURE — 99211 OFF/OP EST MAY X REQ PHY/QHP: CPT

## 2023-08-23 PROCEDURE — 3079F DIAST BP 80-89 MM HG: CPT | Performed by: INTERNAL MEDICINE

## 2023-08-23 PROCEDURE — 3077F SYST BP >= 140 MM HG: CPT | Performed by: INTERNAL MEDICINE

## 2023-08-23 PROCEDURE — G8417 CALC BMI ABV UP PARAM F/U: HCPCS | Performed by: INTERNAL MEDICINE

## 2023-08-23 PROCEDURE — G8428 CUR MEDS NOT DOCUMENT: HCPCS | Performed by: INTERNAL MEDICINE

## 2023-08-23 PROCEDURE — 3017F COLORECTAL CA SCREEN DOC REV: CPT | Performed by: INTERNAL MEDICINE

## 2023-08-23 PROCEDURE — 99213 OFFICE O/P EST LOW 20 MIN: CPT | Performed by: INTERNAL MEDICINE

## 2023-08-23 NOTE — PROGRESS NOTES
MA Rooming Questions  Patient: Nithya Green  MRN: [id number]    Date: 8/23/2023        1. Do you have any new issues?   no         2. Do you need any refills on medications? Yes-Xarelto    3. Have you had any imaging done since your last visit? yes - VL dup    4. Have you been hospitalized or seen in the emergency room since your last visit here?   no    5. Did the patient have a depression screening completed today?  No    No data recorded     PHQ-9 Given to (if applicable):               PHQ-9 Score (if applicable):                     [] Positive     []  Negative              Does question #9 need addressed (if applicable)                     [] Yes    []  No               Hong Gonzalez CMA

## 2023-11-02 RX ORDER — RIVAROXABAN 20 MG/1
20 TABLET, FILM COATED ORAL
Qty: 30 TABLET | Refills: 1 | Status: SHIPPED | OUTPATIENT
Start: 2023-11-02

## 2023-12-01 NOTE — TELEPHONE ENCOUNTER
Patient contacted our office in need of refill for Giovana Shanks. Patient has a scheduled appointment on 12/27. Patients preferred pharmacy is [facility].  Pending for patients chart provider KALIN HALL.

## 2023-12-28 RX ORDER — RIVAROXABAN 20 MG/1
20 TABLET, FILM COATED ORAL
Qty: 30 TABLET | Refills: 1 | OUTPATIENT
Start: 2023-12-28

## 2024-02-26 ENCOUNTER — OFFICE VISIT (OUTPATIENT)
Dept: ONCOLOGY | Age: 64
End: 2024-02-26
Payer: MEDICAID

## 2024-02-26 ENCOUNTER — HOSPITAL ENCOUNTER (OUTPATIENT)
Dept: INFUSION THERAPY | Age: 64
Discharge: HOME | End: 2024-02-26
Payer: MEDICAID

## 2024-02-26 VITALS
RESPIRATION RATE: 16 BRPM | OXYGEN SATURATION: 96 % | HEART RATE: 95 BPM | TEMPERATURE: 99.2 F | HEIGHT: 65 IN | SYSTOLIC BLOOD PRESSURE: 129 MMHG | BODY MASS INDEX: 31.86 KG/M2 | DIASTOLIC BLOOD PRESSURE: 67 MMHG | WEIGHT: 191.2 LBS

## 2024-02-26 DIAGNOSIS — I82.411 ACUTE DEEP VEIN THROMBOSIS (DVT) OF FEMORAL VEIN OF RIGHT LOWER EXTREMITY (HCC): Primary | ICD-10-CM

## 2024-02-26 PROCEDURE — G8427 DOCREV CUR MEDS BY ELIG CLIN: HCPCS | Performed by: INTERNAL MEDICINE

## 2024-02-26 PROCEDURE — G8417 CALC BMI ABV UP PARAM F/U: HCPCS | Performed by: INTERNAL MEDICINE

## 2024-02-26 PROCEDURE — 3078F DIAST BP <80 MM HG: CPT | Performed by: INTERNAL MEDICINE

## 2024-02-26 PROCEDURE — 3074F SYST BP LT 130 MM HG: CPT | Performed by: INTERNAL MEDICINE

## 2024-02-26 PROCEDURE — 99213 OFFICE O/P EST LOW 20 MIN: CPT | Performed by: INTERNAL MEDICINE

## 2024-02-26 PROCEDURE — G8484 FLU IMMUNIZE NO ADMIN: HCPCS | Performed by: INTERNAL MEDICINE

## 2024-02-26 PROCEDURE — 3017F COLORECTAL CA SCREEN DOC REV: CPT | Performed by: INTERNAL MEDICINE

## 2024-02-26 PROCEDURE — 4004F PT TOBACCO SCREEN RCVD TLK: CPT | Performed by: INTERNAL MEDICINE

## 2024-02-26 PROCEDURE — 99211 OFF/OP EST MAY X REQ PHY/QHP: CPT

## 2024-02-26 ASSESSMENT — PATIENT HEALTH QUESTIONNAIRE - PHQ9
SUM OF ALL RESPONSES TO PHQ QUESTIONS 1-9: 0
SUM OF ALL RESPONSES TO PHQ9 QUESTIONS 1 & 2: 0
SUM OF ALL RESPONSES TO PHQ QUESTIONS 1-9: 0
2. FEELING DOWN, DEPRESSED OR HOPELESS: 0
SUM OF ALL RESPONSES TO PHQ QUESTIONS 1-9: 0
SUM OF ALL RESPONSES TO PHQ QUESTIONS 1-9: 0
1. LITTLE INTEREST OR PLEASURE IN DOING THINGS: 0

## 2024-02-26 NOTE — PROGRESS NOTES
MA Rooming Questions  Patient: Vasile Meyer  MRN: [id number]    Date: 2/26/2024        1. Do you have any new issues?   yes - eileen horse left leg         2. Do you need any refills on medications?    Yes- Xarelto     3. Have you had any imaging done since your last visit?   no    4. Have you been hospitalized or seen in the emergency room since your last visit here?   no    5. Did the patient have a depression screening completed today? Yes    No data recorded     PHQ-9 Given to (if applicable):               PHQ-9 Score (if applicable):                     [] Positive     []  Negative              Does question #9 need addressed (if applicable)                     [] Yes    []  No               Xiao Luis CMA    
results found for: \"B2M\"  Coagulation Panel:  Lab Results   Component Value Date    PROTIME 14.0 09/14/2017    INR 1.22 09/14/2017    APTT 28.4 05/10/2017    DDIMER 882 (H) 05/13/2023     Anemia Panel:  No results found for: \"UFZLEBXM47\", \"FOLATE\"  Tumor Markers:  Lab Results   Component Value Date    PSA 1.03 12/05/2022     Observations:  PHQ-9 Total Score: 0 (2/26/2024  1:34 PM)     Assessment:  Right lower extremity acute DVT    Plan:  Vasile Meyer is a 63 y.o. male who was found to have RLE acute DVT on 5/13/23 when he presented with worsening pain in his right lower leg for 2 to 3 days.    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    On February 26, 2024, he presented to me for follow up. Reviewed findings on repeat doppler done on 8/14/23.     He has limited mobility in RLE due to chronic Rt hip pain from osteoarthritis. I believe his DVT is due to decreased activity.     I recommend him to continue with xarelto for now. If his activity improves after hip surgery, I will consider to stop AC therapy.     He still hasn't had hip surgery yet since his DM hasn't been controlled well. Dr. Starks will consider hip surgery after he achieves HbA1c less than 8.     Meanwhile, I will continue with current dose of xarelto for now. I recommend him to f/u with his othopedics for surgery. We will plan to bridge with lovenox before and after surgery.     I answered all his questions and concerns for today. Recent imaging and labs were reviewed and discussed with the patient.   
popliteal vein.    On August 23, 2023, he presented to me for follow up. Reviewed with him findings on repeat doppler done on 8/14/23.     He has limited mobility in RLE due to chronic Rt hip pain from osteoarthritis. I believe his DVT is due to decreased activity.     I recommend him to continue with xarelto for now. If his activity improves after hip surgery, I will consider to stop AC therapy.     Meanwhile, I will continue with current dose of xarelto for now. I recommend him to f/u with his othopedics for surgery. We will plan to bridge with lovenox before and after surgery.     I answered all his questions and concerns for today. Recent imaging and labs were reviewed and discussed with the patient.

## 2024-03-11 ENCOUNTER — HOSPITAL ENCOUNTER (EMERGENCY)
Age: 64
Discharge: HOME | End: 2024-03-11
Attending: EMERGENCY MEDICINE
Payer: MEDICAID

## 2024-03-11 VITALS
BODY MASS INDEX: 31.99 KG/M2 | DIASTOLIC BLOOD PRESSURE: 71 MMHG | HEIGHT: 65 IN | HEART RATE: 100 BPM | TEMPERATURE: 98.1 F | RESPIRATION RATE: 18 BRPM | SYSTOLIC BLOOD PRESSURE: 122 MMHG | OXYGEN SATURATION: 96 % | WEIGHT: 192 LBS

## 2024-03-11 DIAGNOSIS — K04.7 DENTAL INFECTION: Primary | ICD-10-CM

## 2024-03-11 PROCEDURE — 64400 NJX AA&/STRD TRIGEMINAL NRV: CPT

## 2024-03-11 PROCEDURE — 99283 EMERGENCY DEPT VISIT LOW MDM: CPT

## 2024-03-11 PROCEDURE — 6360000002 HC RX W HCPCS: Performed by: EMERGENCY MEDICINE

## 2024-03-11 PROCEDURE — 6370000000 HC RX 637 (ALT 250 FOR IP): Performed by: EMERGENCY MEDICINE

## 2024-03-11 RX ORDER — BUPIVACAINE HYDROCHLORIDE 5 MG/ML
50 INJECTION, SOLUTION PERINEURAL ONCE
Status: DISCONTINUED | OUTPATIENT
Start: 2024-03-11 | End: 2024-03-11

## 2024-03-11 RX ORDER — AMOXICILLIN AND CLAVULANATE POTASSIUM 875; 125 MG/1; MG/1
1 TABLET, FILM COATED ORAL 2 TIMES DAILY
Qty: 20 TABLET | Refills: 0 | Status: SHIPPED | OUTPATIENT
Start: 2024-03-11 | End: 2024-03-21

## 2024-03-11 RX ORDER — BUPIVACAINE HYDROCHLORIDE 5 MG/ML
30 INJECTION, SOLUTION EPIDURAL; INTRACAUDAL ONCE
Status: COMPLETED | OUTPATIENT
Start: 2024-03-11 | End: 2024-03-11

## 2024-03-11 RX ORDER — AMOXICILLIN AND CLAVULANATE POTASSIUM 875; 125 MG/1; MG/1
1 TABLET, FILM COATED ORAL ONCE
Status: COMPLETED | OUTPATIENT
Start: 2024-03-11 | End: 2024-03-11

## 2024-03-11 RX ORDER — HYDROCODONE BITARTRATE AND ACETAMINOPHEN 5; 325 MG/1; MG/1
1 TABLET ORAL EVERY 4 HOURS PRN
Qty: 10 TABLET | Refills: 0 | Status: SHIPPED | OUTPATIENT
Start: 2024-03-11 | End: 2024-03-14

## 2024-03-11 RX ADMIN — BUPIVACAINE HYDROCHLORIDE 150 MG: 5 INJECTION, SOLUTION EPIDURAL; INTRACAUDAL at 14:32

## 2024-03-11 RX ADMIN — AMOXICILLIN AND CLAVULANATE POTASSIUM 1 TABLET: 875; 125 TABLET, FILM COATED ORAL at 14:48

## 2024-03-11 ASSESSMENT — PAIN - FUNCTIONAL ASSESSMENT: PAIN_FUNCTIONAL_ASSESSMENT: 0-10

## 2024-03-11 ASSESSMENT — PAIN SCALES - GENERAL: PAINLEVEL_OUTOF10: 8

## 2024-03-11 NOTE — ED PROVIDER NOTES
Dispense Refill    amoxicillin-clavulanate (AUGMENTIN) 875-125 MG per tablet Take 1 tablet by mouth 2 times daily for 10 days 20 tablet 0    HYDROcodone-acetaminophen (NORCO) 5-325 MG per tablet Take 1 tablet by mouth every 4 hours as needed for Pain for up to 3 days. Intended supply: 3 days. Take lowest dose possible to manage pain Max Daily Amount: 6 tablets 10 tablet 0    benzocaine (ORAJEL) 20 % GEL mucosal gel Apply BID prn pain to affected tooth for 3-5 days 30 g 0    rivaroxaban (XARELTO) 20 MG TABS tablet Take 1 tablet by mouth daily (with breakfast) 90 tablet 1    glipiZIDE (GLUCOTROL) 10 MG tablet       VENTOLIN  (90 Base) MCG/ACT inhaler       acetaminophen (TYLENOL) 325 MG tablet Take 2 tablets by mouth every 6 hours as needed for Pain      fluticasone (FLONASE) 50 MCG/ACT nasal spray by Each Nare route as needed for Rhinitis       pantoprazole (PROTONIX) 40 MG tablet Take 1 tablet by mouth every morning      famotidine (PEPCID) 20 MG tablet Take 1 tablet by mouth 2 times daily (Patient taking differently: Take 1 tablet by mouth every morning) 60 tablet 3    dicyclomine (BENTYL) 10 MG capsule Take 1 capsule by mouth 3 times daily as needed (Abdominal pain) (Patient taking differently: Take 1 capsule by mouth every morning) 15 capsule 3    LORazepam (ATIVAN) 1 MG tablet Take 1 tablet by mouth 3 times daily.      zolpidem (AMBIEN) 10 MG tablet Take 1 tablet by mouth nightly as needed for Sleep.      cetirizine (ZYRTEC) 10 MG tablet Take 1 tablet by mouth daily      fenofibrate (TRICOR) 54 MG tablet Take 1 tablet by mouth every morning s [facility] pharmacy: Please dispense generic fenofibrate unless prescriber denote       Allergies   Allergen Reactions    Aspirin Hives and Swelling    Bactrim [Sulfamethoxazole-Trimethoprim] Shortness Of Breath, Itching and Rash    Benadryl [Diphenhydramine] Anxiety    Bupropion Swelling    Cefdinir Swelling    Cefuroxime Axetil Swelling     \"Swelling Of Lips And Tongue\"

## 2024-06-05 NOTE — PROGRESS NOTES
Patient requesting refill on xarelto 20mg daily. RX reordered and e-scribed to [facility] in [place]

## 2024-10-16 ENCOUNTER — HOSPITAL ENCOUNTER (EMERGENCY)
Age: 64
Discharge: HOME | End: 2024-10-17
Attending: EMERGENCY MEDICINE
Payer: MEDICAID

## 2024-10-16 ENCOUNTER — APPOINTMENT (OUTPATIENT)
Dept: GENERAL RADIOLOGY | Age: 64
End: 2024-10-16
Attending: EMERGENCY MEDICINE
Payer: MEDICAID

## 2024-10-16 DIAGNOSIS — M62.838 SPASM OF MUSCLE: ICD-10-CM

## 2024-10-16 DIAGNOSIS — M25.551 PAIN OF RIGHT HIP: Primary | ICD-10-CM

## 2024-10-16 LAB
ANION GAP SERPL CALCULATED.3IONS-SCNC: 14 MMOL/L (ref 4–16)
BASOPHILS # BLD: 0.07 K/UL
BASOPHILS NFR BLD: 2 % (ref 0–1)
BUN SERPL-MCNC: 17 MG/DL (ref 6–23)
CALCIUM SERPL-MCNC: 8.5 MG/DL (ref 8.3–10.6)
CHLORIDE SERPL-SCNC: 106 MMOL/L (ref 99–110)
CO2 SERPL-SCNC: 19 MMOL/L (ref 21–32)
CREAT SERPL-MCNC: 0.7 MG/DL (ref 0.9–1.3)
EOSINOPHIL # BLD: 0.15 K/UL
EOSINOPHILS RELATIVE PERCENT: 4 % (ref 0–3)
ERYTHROCYTE [DISTWIDTH] IN BLOOD BY AUTOMATED COUNT: 14.5 % (ref 11.7–14.9)
GFR, ESTIMATED: >90 ML/MIN/1.73M2
GLUCOSE SERPL-MCNC: 345 MG/DL (ref 70–99)
HCT VFR BLD AUTO: 43.2 % (ref 42–52)
HGB BLD-MCNC: 14.4 G/DL (ref 13.5–18)
IMM GRANULOCYTES # BLD AUTO: 0 K/UL
IMM GRANULOCYTES NFR BLD: 0 %
LYMPHOCYTES NFR BLD: 0.83 K/UL
LYMPHOCYTES RELATIVE PERCENT: 21 % (ref 24–44)
MCH RBC QN AUTO: 31.9 PG (ref 27–31)
MCHC RBC AUTO-ENTMCNC: 33.3 G/DL (ref 32–36)
MCV RBC AUTO: 95.8 FL (ref 78–100)
MONOCYTES NFR BLD: 0.41 K/UL
MONOCYTES NFR BLD: 10 % (ref 0–4)
NEUTROPHILS NFR BLD: 64 % (ref 36–66)
NEUTS SEG NFR BLD: 2.59 K/UL
PLATELET # BLD AUTO: 76 K/UL (ref 140–440)
PLATELET CONFIRMATION: NORMAL
PMV BLD AUTO: 10.6 FL (ref 7.5–11.1)
POTASSIUM SERPL-SCNC: 4.6 MMOL/L (ref 3.5–5.1)
RBC # BLD AUTO: 4.51 M/UL (ref 4.6–6.2)
SODIUM SERPL-SCNC: 139 MMOL/L (ref 135–145)
WBC OTHER # BLD: 4.1 K/UL (ref 4–10.5)

## 2024-10-16 PROCEDURE — 85025 COMPLETE CBC W/AUTO DIFF WBC: CPT

## 2024-10-16 PROCEDURE — 96374 THER/PROPH/DIAG INJ IV PUSH: CPT

## 2024-10-16 PROCEDURE — 6360000002 HC RX W HCPCS: Performed by: EMERGENCY MEDICINE

## 2024-10-16 PROCEDURE — 96375 TX/PRO/DX INJ NEW DRUG ADDON: CPT

## 2024-10-16 PROCEDURE — 99284 EMERGENCY DEPT VISIT MOD MDM: CPT

## 2024-10-16 PROCEDURE — 80048 BASIC METABOLIC PNL TOTAL CA: CPT

## 2024-10-16 PROCEDURE — 73502 X-RAY EXAM HIP UNI 2-3 VIEWS: CPT

## 2024-10-16 RX ORDER — HYDROCODONE BITARTRATE AND ACETAMINOPHEN 5; 325 MG/1; MG/1
1-2 TABLET ORAL EVERY 8 HOURS PRN
Qty: 9 TABLET | Refills: 0 | Status: SHIPPED | OUTPATIENT
Start: 2024-10-16 | End: 2024-10-19

## 2024-10-16 RX ORDER — METHOCARBAMOL 100 MG/ML
1000 INJECTION, SOLUTION INTRAMUSCULAR; INTRAVENOUS ONCE
Status: COMPLETED | OUTPATIENT
Start: 2024-10-16 | End: 2024-10-16

## 2024-10-16 RX ADMIN — HYDROMORPHONE HYDROCHLORIDE 1 MG: 1 INJECTION, SOLUTION INTRAMUSCULAR; INTRAVENOUS; SUBCUTANEOUS at 22:29

## 2024-10-16 RX ADMIN — METHOCARBAMOL 1000 MG: 100 INJECTION INTRAMUSCULAR; INTRAVENOUS at 22:28

## 2024-10-16 ASSESSMENT — PAIN DESCRIPTION - DESCRIPTORS
DESCRIPTORS: ACHING

## 2024-10-16 ASSESSMENT — PAIN DESCRIPTION - LOCATION
LOCATION: HIP
LOCATION: HIP

## 2024-10-16 ASSESSMENT — PAIN SCALES - GENERAL
PAINLEVEL_OUTOF10: 3
PAINLEVEL_OUTOF10: 6
PAINLEVEL_OUTOF10: 7

## 2024-10-16 ASSESSMENT — ENCOUNTER SYMPTOMS
EYES NEGATIVE: 1
GASTROINTESTINAL NEGATIVE: 1
RESPIRATORY NEGATIVE: 1

## 2024-10-16 ASSESSMENT — PAIN DESCRIPTION - ORIENTATION
ORIENTATION: RIGHT
ORIENTATION: RIGHT

## 2024-10-16 ASSESSMENT — PAIN - FUNCTIONAL ASSESSMENT: PAIN_FUNCTIONAL_ASSESSMENT: 0-10

## 2024-10-17 VITALS
HEIGHT: 65 IN | BODY MASS INDEX: 30.82 KG/M2 | SYSTOLIC BLOOD PRESSURE: 130 MMHG | RESPIRATION RATE: 12 BRPM | DIASTOLIC BLOOD PRESSURE: 70 MMHG | WEIGHT: 185 LBS | TEMPERATURE: 98.2 F | OXYGEN SATURATION: 100 % | HEART RATE: 70 BPM

## 2024-10-17 NOTE — ED PROVIDER NOTES
Thought content normal.         Judgment: Judgment normal.         MDM:    Labs Reviewed   CBC WITH AUTO DIFFERENTIAL - Abnormal; Notable for the following components:       Result Value    RBC 4.51 (*)     MCH 31.9 (*)     Platelets 76 (*)     Lymphocytes % 21 (*)     Monocytes % 10 (*)     Eosinophils % 4 (*)     Basophils % 2 (*)     All other components within normal limits   BASIC METABOLIC PANEL - Abnormal; Notable for the following components:    CO2 19 (*)     Glucose 345 (*)     Creatinine 0.7 (*)     All other components within normal limits   PLATELET CONFIRMATION       Procedures: None      ED Course, and Reassessment: Patient's x-ray does not show any evidence of acute pathology.  The patient was given injection of Dilaudid as well as Robaxin infusion had resolution of his pain and discomfort.  Patient was told to continue using crutches as needed pain control outpatient patient following up with Dr. Curiel who is his orthopedic surgeon he will be calling him tomorrow for follow-up appointment        History from : Patient    Limitations to history : None    Patient was given the following medications:  Medications   HYDROmorphone (DILAUDID) injection 1 mg (1 mg IntraVENous Given 10/16/24 2229)   methocarbamol (ROBAXIN) injection 1,000 mg (1,000 mg IntraVENous Given 10/16/24 2228)       Medications:   New Prescriptions    HYDROCODONE-ACETAMINOPHEN (NORCO) 5-325 MG PER TABLET    Take 1-2 tablets by mouth every 8 hours as needed for Pain for up to 3 days. Max Daily Amount: 6 tablets    TIZANIDINE (ZANAFLEX) 4 MG TABLET    Take 1 tablet by mouth every 8 hours as needed (spasm)       Independent Imaging Interpretation by Radiology  XR HIP RIGHT (2-3 VIEWS)   Final Result   No evidence of right hip fracture         Electronically signed by Dayne Olivo          EKG (if obtained): (All EKG's are interpreted by myself in the absence of a cardiologist)  None     Chronic conditions affecting care:none

## 2024-10-17 NOTE — DISCHARGE INSTR - COC
I certify the above information and transfer of Vasile Meyer  is necessary for the continuing treatment of the diagnosis listed and that he requires {Admit to Appropriate Level of Care:47646} for {GREATER/LESS:888097391} 30 days.     Update Admission H&P: {CHP DME Changes in HandP:019513289}    PHYSICIAN SIGNATURE:  {Esignature:[id number]}

## 2024-11-03 ENCOUNTER — HOSPITAL ENCOUNTER (EMERGENCY)
Age: 64
Discharge: HOME | End: 2024-11-03
Attending: EMERGENCY MEDICINE
Payer: MEDICAID

## 2024-11-03 VITALS
WEIGHT: 185 LBS | TEMPERATURE: 99 F | OXYGEN SATURATION: 96 % | SYSTOLIC BLOOD PRESSURE: 147 MMHG | HEART RATE: 100 BPM | DIASTOLIC BLOOD PRESSURE: 74 MMHG | RESPIRATION RATE: 18 BRPM | HEIGHT: 65 IN | BODY MASS INDEX: 30.82 KG/M2

## 2024-11-03 DIAGNOSIS — G89.29 CHRONIC RIGHT HIP PAIN: Primary | ICD-10-CM

## 2024-11-03 DIAGNOSIS — M25.551 CHRONIC RIGHT HIP PAIN: Primary | ICD-10-CM

## 2024-11-03 PROCEDURE — 6370000000 HC RX 637 (ALT 250 FOR IP): Performed by: EMERGENCY MEDICINE

## 2024-11-03 PROCEDURE — 99283 EMERGENCY DEPT VISIT LOW MDM: CPT

## 2024-11-03 RX ORDER — HYDROCODONE BITARTRATE AND ACETAMINOPHEN 5; 325 MG/1; MG/1
1 TABLET ORAL ONCE
Status: COMPLETED | OUTPATIENT
Start: 2024-11-03 | End: 2024-11-03

## 2024-11-03 RX ADMIN — HYDROCODONE BITARTRATE AND ACETAMINOPHEN 1 TABLET: 5; 325 TABLET ORAL at 15:33

## 2024-11-03 ASSESSMENT — PAIN DESCRIPTION - LOCATION: LOCATION: HIP

## 2024-11-03 ASSESSMENT — PAIN DESCRIPTION - ORIENTATION: ORIENTATION: RIGHT

## 2024-11-03 ASSESSMENT — PAIN DESCRIPTION - DESCRIPTORS: DESCRIPTORS: ACHING;THROBBING

## 2024-11-03 ASSESSMENT — PAIN SCALES - GENERAL
PAINLEVEL_OUTOF10: 10
PAINLEVEL_OUTOF10: 10

## 2024-11-03 ASSESSMENT — PAIN - FUNCTIONAL ASSESSMENT: PAIN_FUNCTIONAL_ASSESSMENT: 0-10

## 2024-11-03 NOTE — DISCHARGE INSTRUCTIONS
Please consult with your orthopedic surgeons office or your pain specialist tomorrow for further care and recommendations for your ongoing chronic right hip pain  You may take Tylenol 1000 mg 3-4 times a day as needed for pain

## 2024-11-03 NOTE — ED PROVIDER NOTES
Clarithromycin Swelling    Doxycycline Hyclate Anaphylaxis    Eliquis [Apixaban]      \"Numbness In Fingers And Toes\"    Motrin [Ibuprofen]      \"My Chest Hurts\"    Prilosec [Omeprazole]      \"Stomach Pain\"    Sulfa Antibiotics Shortness Of Breath, Itching and Rash    Vistaril [Hydroxyzine Hcl] Anxiety       Nursing Notes Reviewed    Physical Exam:  Triage VS:    ED Triage Vitals   Encounter Vitals Group      BP       Systolic BP Percentile       Diastolic BP Percentile       Pulse       Resp       Temp       Temp src       SpO2       Weight       Height       Head Circumference       Peak Flow       Pain Score       Pain Loc       Pain Education       Exclude from Growth Chart        My pulse ox interpretation is - normal    General appearance:  No acute distress.   Skin:  Warm. Dry.   Extremity: Both active and passive range of motion of the right lower extremity at the hip are limiting due to pain.  There is some lateral right hip tenderness with no crepitance or erythema.  No focal neurologic findings is uncovered on examination and the vasculature to the extremity are intact     Heart:  Regular rate and rhythm, normal S1 & S2, no extra heart sounds.    Perfusion:  intact  Respiratory:  Lungs clear to auscultation bilaterally.  Respirations nonlabored.     Abdominal:  Normal bowel sounds.  Soft.  Nontender.  Non distended.  Back:  No CVA tenderness to palpation     Neurological:  Alert and oriented times 3.  No focal neuro deficits.             Psychiatric:  Appropriate    I have reviewed and interpreted all of the currently available lab results from this visit (if applicable):  No results found for this visit on 11/03/24.   Radiographs (if obtained):      EKG (if obtained): (All EKG's are interpreted by myself in the absence of a cardiologist)      MDM:  Patient has a chronic right hip pain since childhood and is being followed by his orthopedic surgeon, Dr. Rossi as well as pain specialist.  He states

## 2024-12-01 ENCOUNTER — HOSPITAL ENCOUNTER (EMERGENCY)
Age: 64
Discharge: HOME | End: 2024-12-01
Attending: EMERGENCY MEDICINE
Payer: MEDICAID

## 2024-12-01 VITALS
RESPIRATION RATE: 16 BRPM | HEART RATE: 80 BPM | OXYGEN SATURATION: 98 % | TEMPERATURE: 98.3 F | SYSTOLIC BLOOD PRESSURE: 136 MMHG | DIASTOLIC BLOOD PRESSURE: 78 MMHG

## 2024-12-01 DIAGNOSIS — M16.11 UNILATERAL PRIMARY OSTEOARTHRITIS, RIGHT HIP: ICD-10-CM

## 2024-12-01 DIAGNOSIS — M25.551 CHRONIC RIGHT HIP PAIN: Primary | ICD-10-CM

## 2024-12-01 DIAGNOSIS — G89.29 CHRONIC RIGHT HIP PAIN: Primary | ICD-10-CM

## 2024-12-01 PROCEDURE — 96375 TX/PRO/DX INJ NEW DRUG ADDON: CPT

## 2024-12-01 PROCEDURE — 96374 THER/PROPH/DIAG INJ IV PUSH: CPT

## 2024-12-01 PROCEDURE — 99284 EMERGENCY DEPT VISIT MOD MDM: CPT

## 2024-12-01 PROCEDURE — 6370000000 HC RX 637 (ALT 250 FOR IP): Performed by: EMERGENCY MEDICINE

## 2024-12-01 PROCEDURE — 6360000002 HC RX W HCPCS: Performed by: EMERGENCY MEDICINE

## 2024-12-01 RX ORDER — LIDOCAINE 50 MG/G
1 PATCH TOPICAL DAILY
Qty: 10 PATCH | Refills: 0 | Status: SHIPPED | OUTPATIENT
Start: 2024-12-01 | End: 2024-12-11

## 2024-12-01 RX ORDER — ONDANSETRON 2 MG/ML
4 INJECTION INTRAMUSCULAR; INTRAVENOUS ONCE
Status: COMPLETED | OUTPATIENT
Start: 2024-12-01 | End: 2024-12-01

## 2024-12-01 RX ORDER — ACETAMINOPHEN 500 MG
500 TABLET ORAL ONCE
Status: COMPLETED | OUTPATIENT
Start: 2024-12-01 | End: 2024-12-01

## 2024-12-01 RX ORDER — DEXAMETHASONE SODIUM PHOSPHATE 10 MG/ML
8 INJECTION, SOLUTION INTRAMUSCULAR; INTRAVENOUS ONCE
Status: COMPLETED | OUTPATIENT
Start: 2024-12-01 | End: 2024-12-01

## 2024-12-01 RX ORDER — ORPHENADRINE CITRATE 30 MG/ML
30 INJECTION INTRAMUSCULAR; INTRAVENOUS ONCE
Status: COMPLETED | OUTPATIENT
Start: 2024-12-01 | End: 2024-12-01

## 2024-12-01 RX ORDER — MORPHINE SULFATE 4 MG/ML
4 INJECTION, SOLUTION INTRAMUSCULAR; INTRAVENOUS ONCE
Status: COMPLETED | OUTPATIENT
Start: 2024-12-01 | End: 2024-12-01

## 2024-12-01 RX ORDER — LIDOCAINE 4 G/G
1 PATCH TOPICAL DAILY
Status: DISCONTINUED | OUTPATIENT
Start: 2024-12-01 | End: 2024-12-01 | Stop reason: HOSPADM

## 2024-12-01 RX ORDER — METHOCARBAMOL 500 MG/1
500 TABLET, FILM COATED ORAL 3 TIMES DAILY
Qty: 30 TABLET | Refills: 0 | Status: SHIPPED | OUTPATIENT
Start: 2024-12-01 | End: 2024-12-11

## 2024-12-01 RX ADMIN — DEXAMETHASONE SODIUM PHOSPHATE 8 MG: 10 INJECTION, SOLUTION INTRAMUSCULAR; INTRAVENOUS at 18:33

## 2024-12-01 RX ADMIN — ONDANSETRON 4 MG: 2 INJECTION, SOLUTION INTRAMUSCULAR; INTRAVENOUS at 18:33

## 2024-12-01 RX ADMIN — MORPHINE SULFATE 4 MG: 4 INJECTION, SOLUTION INTRAMUSCULAR; INTRAVENOUS at 18:34

## 2024-12-01 RX ADMIN — ACETAMINOPHEN 500 MG: 500 TABLET ORAL at 18:34

## 2024-12-01 RX ADMIN — ORPHENADRINE CITRATE 30 MG: 60 INJECTION INTRAMUSCULAR; INTRAVENOUS at 18:33

## 2024-12-01 ASSESSMENT — LIFESTYLE VARIABLES
HOW MANY STANDARD DRINKS CONTAINING ALCOHOL DO YOU HAVE ON A TYPICAL DAY: PATIENT DOES NOT DRINK
HOW OFTEN DO YOU HAVE A DRINK CONTAINING ALCOHOL: NEVER

## 2024-12-01 ASSESSMENT — PAIN - FUNCTIONAL ASSESSMENT
PAIN_FUNCTIONAL_ASSESSMENT: 0-10
PAIN_FUNCTIONAL_ASSESSMENT: PREVENTS OR INTERFERES WITH ALL ACTIVE AND SOME PASSIVE ACTIVITIES
PAIN_FUNCTIONAL_ASSESSMENT: PREVENTS OR INTERFERES WITH MANY ACTIVE NOT PASSIVE ACTIVITIES
PAIN_FUNCTIONAL_ASSESSMENT: 0-10
PAIN_FUNCTIONAL_ASSESSMENT: PREVENTS OR INTERFERES SOME ACTIVE ACTIVITIES AND ADLS

## 2024-12-01 ASSESSMENT — PAIN SCALES - GENERAL
PAINLEVEL_OUTOF10: 4
PAINLEVEL_OUTOF10: 5
PAINLEVEL_OUTOF10: 10

## 2024-12-01 ASSESSMENT — PAIN DESCRIPTION - PAIN TYPE
TYPE: CHRONIC PAIN
TYPE: CHRONIC PAIN
TYPE: ACUTE PAIN

## 2024-12-01 ASSESSMENT — PAIN DESCRIPTION - FREQUENCY: FREQUENCY: CONTINUOUS

## 2024-12-01 ASSESSMENT — PAIN DESCRIPTION - LOCATION
LOCATION: HIP

## 2024-12-01 ASSESSMENT — PAIN DESCRIPTION - ORIENTATION
ORIENTATION: RIGHT

## 2024-12-01 ASSESSMENT — PAIN DESCRIPTION - DESCRIPTORS
DESCRIPTORS: ACHING;DISCOMFORT;SHOOTING
DESCRIPTORS: ACHING;DISCOMFORT
DESCRIPTORS: DISCOMFORT

## 2024-12-01 ASSESSMENT — PAIN DESCRIPTION - ONSET: ONSET: ON-GOING

## 2024-12-01 NOTE — ED PROVIDER NOTES
Emergency Department Encounter    Patient: Vasile Meyer  MRN: [id number]  : 1960  Date of Evaluation: 2024  ED Provider:  Eloise Wheeler DO    Triage Chief Complaint:   Hip Pain (Rt hip pain . Pt states he hasn't had any steroid injections for 3 to 4 months.  Pt  has been waiting to hear from a new pain management doctor for the last couple of moths. NKI. Pt states he is using crutches for two years because he can't bear any wt on rt hip)    [place]:  Vasile Meyer is a 64 y.o. male with history of hyperlipidemia, anxiety, hypertension, paroxysmal atrial fibrillation, sleep apnea, type 2 diabetes, depression, liver cirrhosis, DVT, arthritis right hip, that presents to the emergency department via ambulance for evaluation of right hip pain.  Patient states history of chronic right hip pain.  Patient states he has had right hip surgery in the past.  Patient states hip has been flaring up the last couple days.  Patient states no falls injury or trauma to the right hip recently.  Patient states pain 10 out of 10 on the pain scale.  Patient states he has not taken anything for pain.  Patient states he does use crutches for assistive devices for the last couple years.  Patient states he did have a pain management specialist and was getting hip injections but no longer sees it patient they retired.  Patient states he did see Dr. Gerard some weeks ago and recommended referral to a new pain management specialist.  Patient here for evaluation.    ROS - see HPI, below listed is current ROS at time of my eval:  10 systems reviewed and negative except as above.     Past Medical History:   Diagnosis Date    A-fib (HCC)     Anxiety     \"Severe Anxiety Problems\"    Arthritis     \"Hands, Back, Right Hip\"    Arthritis of right hip 2019    Dr Starks    Back pain     \"All The Time\"    Depression     Diabetes mellitus (HCC) Dx 's    Environmental allergies     GERD (gastroesophageal reflux disease)

## 2024-12-01 NOTE — DISCHARGE INSTRUCTIONS
Follow-up with a pain management specialist for evaluation and treatment of chronic pain.  Call Monday for an appointment  Follow-up with your primary care physician for evaluation of chronic right hip pain.  Call for an appointment  Follow-up with orthospine specialist Dr. Gerard for reevaluation of right hip pain.  Call for an appointment  Take Robaxin muscle relaxant as prescribed and directed.  No drink or drive while taking  Apply Lidoderm pain patch to affected area twice a day  Return to the emergency department immediately any increased pain fever chills nausea vomiting any worsening symptoms.

## 2024-12-20 ENCOUNTER — HOSPITAL ENCOUNTER (EMERGENCY)
Age: 64
Discharge: HOME | End: 2024-12-20
Attending: STUDENT IN AN ORGANIZED HEALTH CARE EDUCATION/TRAINING PROGRAM
Payer: MEDICAID

## 2024-12-20 ENCOUNTER — APPOINTMENT (OUTPATIENT)
Dept: CT IMAGING | Age: 64
End: 2024-12-20
Payer: MEDICAID

## 2024-12-20 ENCOUNTER — APPOINTMENT (OUTPATIENT)
Dept: GENERAL RADIOLOGY | Age: 64
End: 2024-12-20
Payer: MEDICAID

## 2024-12-20 VITALS
HEIGHT: 65 IN | DIASTOLIC BLOOD PRESSURE: 72 MMHG | TEMPERATURE: 98.9 F | WEIGHT: 180 LBS | HEART RATE: 88 BPM | BODY MASS INDEX: 29.99 KG/M2 | SYSTOLIC BLOOD PRESSURE: 134 MMHG | OXYGEN SATURATION: 96 % | RESPIRATION RATE: 17 BRPM
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
## 2024-12-20 DIAGNOSIS — M25.551 HIP PAIN, ACUTE, RIGHT: Primary | ICD-10-CM

## 2024-12-20 LAB
CHP ED QC CHECK: YES
GLUCOSE BLD-MCNC: 162 MG/DL (ref 74–99)
GLUCOSE BLD-MCNC: 167 MG/DL

## 2024-12-20 PROCEDURE — 73552 X-RAY EXAM OF FEMUR 2/>: CPT

## 2024-12-20 PROCEDURE — 6370000000 HC RX 637 (ALT 250 FOR IP): Performed by: STUDENT IN AN ORGANIZED HEALTH CARE EDUCATION/TRAINING PROGRAM

## 2024-12-20 PROCEDURE — 82962 GLUCOSE BLOOD TEST: CPT

## 2024-12-20 PROCEDURE — 71045 X-RAY EXAM CHEST 1 VIEW: CPT

## 2024-12-20 PROCEDURE — 70450 CT HEAD/BRAIN W/O DYE: CPT

## 2024-12-20 PROCEDURE — 99284 EMERGENCY DEPT VISIT MOD MDM: CPT

## 2024-12-20 PROCEDURE — 73502 X-RAY EXAM HIP UNI 2-3 VIEWS: CPT

## 2024-12-20 RX ORDER — LIDOCAINE 4 G/G
2 PATCH TOPICAL DAILY
Status: DISCONTINUED | OUTPATIENT
Start: 2024-12-20 | End: 2024-12-20 | Stop reason: HOSPADM

## 2024-12-20 RX ORDER — OXYCODONE HYDROCHLORIDE 5 MG/1
10 TABLET ORAL ONCE
Status: COMPLETED | OUTPATIENT
Start: 2024-12-20 | End: 2024-12-20

## 2024-12-20 RX ORDER — OXYCODONE HYDROCHLORIDE 5 MG/1
5 TABLET ORAL EVERY 6 HOURS PRN
Qty: 8 TABLET | Refills: 0 | Status: SHIPPED | OUTPATIENT
Start: 2024-12-20 | End: 2024-12-23

## 2024-12-20 RX ORDER — ACETAMINOPHEN 500 MG
1000 TABLET ORAL ONCE
Status: COMPLETED | OUTPATIENT
Start: 2024-12-20 | End: 2024-12-20

## 2024-12-20 RX ADMIN — ACETAMINOPHEN 1000 MG: 500 TABLET ORAL at 09:48

## 2024-12-20 RX ADMIN — OXYCODONE 10 MG: 5 TABLET ORAL at 09:48

## 2024-12-20 ASSESSMENT — PAIN DESCRIPTION - DESCRIPTORS
DESCRIPTORS: BURNING;THROBBING;STABBING
DESCRIPTORS: BURNING;SHARP

## 2024-12-20 ASSESSMENT — PAIN DESCRIPTION - ORIENTATION
ORIENTATION: RIGHT
ORIENTATION_2: RIGHT
ORIENTATION: RIGHT

## 2024-12-20 ASSESSMENT — PAIN DESCRIPTION - FREQUENCY: FREQUENCY: CONTINUOUS

## 2024-12-20 ASSESSMENT — PAIN DESCRIPTION - LOCATION
LOCATION_2: HEAD
LOCATION: HIP
LOCATION: HIP

## 2024-12-20 ASSESSMENT — PAIN SCALES - GENERAL
PAINLEVEL_OUTOF10: 10
PAINLEVEL_OUTOF10: 10

## 2024-12-20 ASSESSMENT — PAIN DESCRIPTION - PAIN TYPE: TYPE: ACUTE PAIN

## 2024-12-20 ASSESSMENT — PAIN - FUNCTIONAL ASSESSMENT
PAIN_FUNCTIONAL_ASSESSMENT_SITE2: ACTIVITIES ARE NOT PREVENTED
PAIN_FUNCTIONAL_ASSESSMENT: 0-10
PAIN_FUNCTIONAL_ASSESSMENT: INTOLERABLE, UNABLE TO DO ANY ACTIVE OR PASSIVE ACTIVITIES
PAIN_FUNCTIONAL_ASSESSMENT: INTOLERABLE, UNABLE TO DO ANY ACTIVE OR PASSIVE ACTIVITIES

## 2024-12-20 NOTE — ED TRIAGE NOTES
Arrived per [facility] to room 4 for triage. Tolerated without difficulty. Bed in lowest position. Call light given. Gowned for exam. Monitor applied.

## 2024-12-20 NOTE — DISCHARGE INSTRUCTIONS
You were seen in the emergency department after falling.  Here, x-rays of your chest and right lower extremity and pelvis did not reveal any injuries, and a CT scan of your brain did not show any bleeding.    After giving you medications for pain, you felt better, reason why there is no need for more interventions in the hospital.    Please make an appointment to be seen by your primary care doctor next week.    For pain, please take acetaminophen 1000 mg up to every 6 hours for the next couple of days.  I prescribed you lidocaine patches that you can apply and keep for 12 hours if they provide help.  Remove then and allow the skin to be without them for other 12 hours before reapplying the next patch.    In case of having severe pain despite therapy with acetaminophen, I am prescribing you a short course of the medication oxycodone.  It is an opiate that you should take only if you need it, and to get rid of it once your symptoms have improved.  Please pick it up in your pharmacy and take it as prescribed.    If at some point you have severe shortness of breath, severe nausea or vomiting unable to keep anything down, feels severely weak unable to stand up, feels confused or are lethargic unable to do your normal daily activities, or have any other concerning symptoms, please come back promptly to the emergency department.

## 2024-12-20 NOTE — ED NOTES
Discharge instructions reviewed with patient. Reviewed medications with patient. No additional questions asked.  Voiced understanding. Encouraged patient to follow up as discussed by the ED physician. Reviewed how to access [facility] at discharged with patient. Encourage to sign up either on their smartphone or on the computer to be able to review the information form today's and future visits. Voiced understanding. No additional questions asked. Patient ambulatory without difficulty. Physician aware.Patient informed that medications may cause drowsiness and should not drive or operate equipment while taking this medication. Patient advised to not drink alcohol while taking this medication. Patient also informed that these medications may cause constipation and should increase fluid, fruit, and fiber while taking this medication. Patient voiced understanding. No additional questions asked.

## 2024-12-20 NOTE — ED PROVIDER NOTES
Emergency Department Encounter    Patient: Vasile Meyer  MRN: [id number]  : 1960  Date of Evaluation: 2024  ED Provider:  Eleno Fuentes MD    Triage Chief Complaint:   Fall and Hip Pain (C/O pain in right hip after 2 falls today. States has had hip pain since he was 18. Unable to bear weight after falling. PMS intact. Denies further injury.)    [place]:  Vasile Meyer is a 64 y.o. male with history significant for atrial fibrillation, anxiety, depression, type 2 diabetes, MAHESH, that presents for 2 mechanical falls at home.  Complaining from pain in his right hip.  The patient mentions to falls that occurred this morning after he woke up.  Both of them occurred while he was ambulating, from standing.  In the first 1, he had just stand up to initiate ambulation, accidentally tripped, and fell onto his right side.  He did not sustain significant head trauma, and had mild worsening of pain in his right hip.  The second fall occurred minutes after, while he was ambulating to the garage.  He landed again onto his right side, this time having the sudden worsening of pain located to his right hip, something that has been constant since, is severe, not radiated, and associated with inability to stand up and bear weight in the extremity.  He mentions that he hit his head in the occipital area against the ground, did not loss his consciousness, recalls all of the events, and denies any headache.  He called EMS for that reason, who transported him to the emergency department.    ROS - see HPI, below listed is current ROS at time of my eval:  Systems reviewed and negative except as above.     Past Medical History:   Diagnosis Date    A-fib (HCC)     Anxiety     \"Severe Anxiety Problems\"    Arthritis     \"Hands, Back, Right Hip\"    Arthritis of right hip 2019    Dr Starks    Back pain     \"All The Time\"    Depression     Diabetes mellitus (HCC) Dx 's    Environmental allergies     GERD

## 2025-01-28 ENCOUNTER — SCHEDULED TELEPHONE ENCOUNTER (OUTPATIENT)
Dept: ONCOLOGY | Age: 65
End: 2025-01-28
Payer: MEDICAID

## 2025-01-28 DIAGNOSIS — I82.411 ACUTE DEEP VEIN THROMBOSIS (DVT) OF FEMORAL VEIN OF RIGHT LOWER EXTREMITY (HCC): Primary | ICD-10-CM

## 2025-01-28 PROCEDURE — 99212 OFFICE O/P EST SF 10 MIN: CPT | Performed by: INTERNAL MEDICINE

## 2025-01-28 ASSESSMENT — PATIENT HEALTH QUESTIONNAIRE - PHQ9
SUM OF ALL RESPONSES TO PHQ9 QUESTIONS 1 & 2: 0
SUM OF ALL RESPONSES TO PHQ QUESTIONS 1-9: 0
SUM OF ALL RESPONSES TO PHQ QUESTIONS 1-9: 0
1. LITTLE INTEREST OR PLEASURE IN DOING THINGS: NOT AT ALL
SUM OF ALL RESPONSES TO PHQ QUESTIONS 1-9: 0
2. FEELING DOWN, DEPRESSED OR HOPELESS: NOT AT ALL
SUM OF ALL RESPONSES TO PHQ QUESTIONS 1-9: 0

## 2025-01-28 NOTE — PROGRESS NOTES
MA Rooming Questions  Patient: Vasile Meyer  MRN: [id number]    Date: 1/28/2025        1. Do you have any new issues?   no       Patient states is having hip sx 3 days from now. And was instructed to stop all medication.   2. Do you need any refills on medications?    no    3. Have you had any imaging done since your last visit?   No.    4. Have you been hospitalized or seen in the emergency room since your last visit here?   Yes- ER fell this was about a month ago.     5. Did the patient have a depression screening completed today? Yes    PHQ-9 Total Score: 0 (1/28/2025  4:06 PM)       PHQ-9 Given to (if applicable):               PHQ-9 Score (if applicable):                     [] Positive     []  Negative              Does question #9 need addressed (if applicable)                     [] Yes    []  No               Bev Mosqueda MA      
(L) 10/16/2024    MONOPCT 10 (H) 10/16/2024    EOSABS 0.15 10/16/2024    BASOSABS 0.07 10/16/2024    LYMPHSABS 0.83 10/16/2024    MONOSABS 0.41 10/16/2024    DIFFTYPE AUTOMATED DIFFERENTIAL 07/28/2023     No results found for: \"ESR\"  Chemistry:  Lab Results   Component Value Date     10/16/2024    K 4.6 10/16/2024     10/16/2024    CO2 19 (L) 10/16/2024    BUN 17 10/16/2024    CREATININE 0.7 (L) 10/16/2024    GLUCOSE 167 12/20/2024    CALCIUM 8.5 10/16/2024    BILITOT 0.3 11/05/2017    ALKPHOS 57 11/05/2017    AST 16 11/05/2017    ALT 41 (H) 07/28/2023    LABGLOM >90 10/16/2024    GFRAA >60 01/02/2021    AGRATIO 1.3 03/28/2017    GLOB 3.0 03/28/2017    PHOS 3.4 10/15/2013    MG 2.2 09/15/2017    POCGLU 162 (H) 12/20/2024     No results found for: \"MMA\", \"LDH\", \"HOMOCYSTEINE\"  No results found for: \"LD\"  Lab Results   Component Value Date    TSHHS 2.080 07/28/2023    T4FREE 1.33 09/08/2014     Immunology:  No results found for: \"SPEP\", \"ALBUMINELP\", \"LABALPH\", \"LABBETA\", \"GAMGLOB\"    No results found for: \"KAPPAUVOL\", \"LAMBDAUVOL\", \"KLFLCR\"  No results found for: \"B2M\"  Coagulation Panel:  Lab Results   Component Value Date    PROTIME 14.0 09/14/2017    INR 1.22 09/14/2017    APTT 28.4 05/10/2017    DDIMER 882 (H) 05/13/2023     Anemia Panel:  No results found for: \"QYQZXYPB63\", \"FOLATE\"  Tumor Markers:  Lab Results   Component Value Date    PSA 1.03 12/05/2022     Observations:  PHQ-9 Total Score: 0 (1/28/2025  4:06 PM)    Assessment:  Right lower extremity acute DVT    Plan:  Vasile Meyer is a 63 y.o. male who was found to have RLE acute DVT on 5/13/23 when he presented with worsening pain in his right lower leg for 2 to 3 days.    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    On January 28, 2025, he presented to me for follow up via televisit. Reviewed findings on repeat doppler

## 2025-01-30 ENCOUNTER — HOSPITAL ENCOUNTER (OUTPATIENT)
Age: 65
Discharge: HOME | End: 2025-01-30
Payer: MEDICAID

## 2025-01-30 LAB
ALT SERPL-CCNC: 26 U/L (ref 10–40)
ANION GAP SERPL CALCULATED.3IONS-SCNC: 12 MMOL/L (ref 4–16)
BASOPHILS # BLD: 0.08 K/UL
BASOPHILS # BLD: 0.09 K/UL
BASOPHILS NFR BLD: 2 % (ref 0–1)
BASOPHILS NFR BLD: 2 % (ref 0–1)
BUN SERPL-MCNC: 19 MG/DL (ref 6–23)
CALCIUM SERPL-MCNC: 8.1 MG/DL (ref 8.3–10.6)
CHLORIDE SERPL-SCNC: 107 MMOL/L (ref 99–110)
CHOLEST SERPL-MCNC: 156 MG/DL (ref 125–199)
CO2 SERPL-SCNC: 22 MMOL/L (ref 21–32)
CREAT SERPL-MCNC: 0.6 MG/DL (ref 0.9–1.3)
EOSINOPHIL # BLD: 0.35 K/UL
EOSINOPHIL # BLD: 0.36 K/UL
EOSINOPHILS RELATIVE PERCENT: 8 % (ref 0–3)
EOSINOPHILS RELATIVE PERCENT: 8 % (ref 0–3)
ERYTHROCYTE [DISTWIDTH] IN BLOOD BY AUTOMATED COUNT: 14.8 % (ref 11.7–14.9)
ERYTHROCYTE [DISTWIDTH] IN BLOOD BY AUTOMATED COUNT: 14.8 % (ref 11.7–14.9)
ERYTHROCYTE [SEDIMENTATION RATE] IN BLOOD BY WESTERGREN METHOD: 13 MM/HR (ref 0–20)
EST. AVERAGE GLUCOSE BLD GHB EST-MCNC: 139 MG/DL
GFR, ESTIMATED: >90 ML/MIN/1.73M2
GLUCOSE SERPL-MCNC: 136 MG/DL (ref 74–99)
HBA1C MFR BLD: 6.5 % (ref 4.2–6.3)
HCT VFR BLD AUTO: 39.8 % (ref 42–52)
HCT VFR BLD AUTO: 40 % (ref 42–52)
HDLC SERPL-MCNC: 64 MG/DL
HGB BLD-MCNC: 13.6 G/DL (ref 13.5–18)
HGB BLD-MCNC: 13.7 G/DL (ref 13.5–18)
IMM GRANULOCYTES # BLD AUTO: 0.01 K/UL
IMM GRANULOCYTES # BLD AUTO: 0.01 K/UL
IMM GRANULOCYTES NFR BLD: 0 %
IMM GRANULOCYTES NFR BLD: 0 %
LDLC SERPL CALC-MCNC: 74 MG/DL
LYMPHOCYTES NFR BLD: 1.23 K/UL
LYMPHOCYTES NFR BLD: 1.33 K/UL
LYMPHOCYTES RELATIVE PERCENT: 28 % (ref 24–44)
LYMPHOCYTES RELATIVE PERCENT: 28 % (ref 24–44)
MCH RBC QN AUTO: 33.6 PG (ref 27–31)
MCH RBC QN AUTO: 33.7 PG (ref 27–31)
MCHC RBC AUTO-ENTMCNC: 34.2 G/DL (ref 32–36)
MCHC RBC AUTO-ENTMCNC: 34.3 G/DL (ref 32–36)
MCV RBC AUTO: 98 FL (ref 78–100)
MCV RBC AUTO: 98.5 FL (ref 78–100)
MONOCYTES NFR BLD: 0.41 K/UL
MONOCYTES NFR BLD: 0.42 K/UL
MONOCYTES NFR BLD: 9 % (ref 0–4)
MONOCYTES NFR BLD: 9 % (ref 0–4)
NEUTROPHILS NFR BLD: 53 % (ref 36–66)
NEUTROPHILS NFR BLD: 54 % (ref 36–66)
NEUTS SEG NFR BLD: 2.32 K/UL
NEUTS SEG NFR BLD: 2.54 K/UL
PLATELET # BLD AUTO: 77 K/UL (ref 140–440)
PLATELET # BLD AUTO: 85 K/UL (ref 140–440)
PLATELET CONFIRMATION: NORMAL
PLATELET CONFIRMATION: NORMAL
PMV BLD AUTO: 9.1 FL (ref 7.5–11.1)
PMV BLD AUTO: 9.3 FL (ref 7.5–11.1)
POTASSIUM SERPL-SCNC: 3.4 MMOL/L (ref 3.5–5.1)
PSA SERPL-MCNC: 0.96 NG/ML (ref 0–4)
RBC # BLD AUTO: 4.04 M/UL (ref 4.6–6.2)
RBC # BLD AUTO: 4.08 M/UL (ref 4.6–6.2)
SODIUM SERPL-SCNC: 141 MMOL/L (ref 135–145)
TRIGL SERPL-MCNC: 88 MG/DL
TSH SERPL DL<=0.05 MIU/L-ACNC: 1.04 UIU/ML (ref 0.27–4.2)
WBC OTHER # BLD: 4.4 K/UL (ref 4–10.5)
WBC OTHER # BLD: 4.8 K/UL (ref 4–10.5)

## 2025-01-30 PROCEDURE — 80061 LIPID PANEL: CPT

## 2025-01-30 PROCEDURE — 80048 BASIC METABOLIC PNL TOTAL CA: CPT

## 2025-01-30 PROCEDURE — 84443 ASSAY THYROID STIM HORMONE: CPT

## 2025-01-30 PROCEDURE — 36415 COLL VENOUS BLD VENIPUNCTURE: CPT

## 2025-01-30 PROCEDURE — 85025 COMPLETE CBC W/AUTO DIFF WBC: CPT

## 2025-01-30 PROCEDURE — G0103 PSA SCREENING: HCPCS

## 2025-01-30 PROCEDURE — 85652 RBC SED RATE AUTOMATED: CPT

## 2025-01-30 PROCEDURE — 84460 ALANINE AMINO (ALT) (SGPT): CPT

## 2025-01-30 PROCEDURE — 83036 HEMOGLOBIN GLYCOSYLATED A1C: CPT

## 2025-03-05 ENCOUNTER — APPOINTMENT (OUTPATIENT)
Dept: GENERAL RADIOLOGY | Age: 65
End: 2025-03-05
Attending: STUDENT IN AN ORGANIZED HEALTH CARE EDUCATION/TRAINING PROGRAM
Payer: MEDICAID

## 2025-03-05 ENCOUNTER — HOSPITAL ENCOUNTER (EMERGENCY)
Age: 65
Discharge: ANOTHER HOSPITAL | End: 2025-03-06
Attending: STUDENT IN AN ORGANIZED HEALTH CARE EDUCATION/TRAINING PROGRAM
Payer: MEDICAID

## 2025-03-05 DIAGNOSIS — D64.9 ANEMIA, UNSPECIFIED TYPE: ICD-10-CM

## 2025-03-05 DIAGNOSIS — R79.1 ELEVATED INR: ICD-10-CM

## 2025-03-05 DIAGNOSIS — R18.8 CIRRHOSIS OF LIVER WITH ASCITES, UNSPECIFIED HEPATIC CIRRHOSIS TYPE (HCC): Primary | ICD-10-CM

## 2025-03-05 DIAGNOSIS — K74.60 CIRRHOSIS OF LIVER WITH ASCITES, UNSPECIFIED HEPATIC CIRRHOSIS TYPE (HCC): Primary | ICD-10-CM

## 2025-03-05 LAB
ALBUMIN SERPL-MCNC: 2.7 G/DL (ref 3.4–5)
ALBUMIN/GLOB SERPL: 0.7 {RATIO} (ref 1.1–2.2)
ALP SERPL-CCNC: 166 U/L (ref 40–129)
ALT SERPL-CCNC: 14 U/L (ref 10–40)
ANION GAP SERPL CALCULATED.3IONS-SCNC: 10 MMOL/L (ref 4–16)
AST SERPL-CCNC: 35 U/L (ref 15–37)
BASOPHILS # BLD: 0.1 K/UL
BASOPHILS NFR BLD: 2 % (ref 0–1)
BILIRUB SERPL-MCNC: 1.1 MG/DL (ref 0–1)
BNP SERPL-MCNC: 53 PG/ML (ref 0–125)
BUN SERPL-MCNC: 16 MG/DL (ref 6–23)
CALCIUM SERPL-MCNC: 7.9 MG/DL (ref 8.3–10.6)
CHLORIDE SERPL-SCNC: 111 MMOL/L (ref 99–110)
CO2 SERPL-SCNC: 23 MMOL/L (ref 21–32)
CREAT SERPL-MCNC: 0.7 MG/DL (ref 0.9–1.3)
EOSINOPHIL # BLD: 0.55 K/UL
EOSINOPHILS RELATIVE PERCENT: 9 % (ref 0–3)
ERYTHROCYTE [DISTWIDTH] IN BLOOD BY AUTOMATED COUNT: 14.3 % (ref 11.7–14.9)
GFR, ESTIMATED: >90 ML/MIN/1.73M2
GLUCOSE SERPL-MCNC: 145 MG/DL (ref 74–99)
HCT VFR BLD AUTO: 33.2 % (ref 42–52)
HGB BLD-MCNC: 10.5 G/DL (ref 13.5–18)
IMM GRANULOCYTES # BLD AUTO: 0.01 K/UL
IMM GRANULOCYTES NFR BLD: 0 %
INR PPP: 5.7
LACTATE BLDV-SCNC: 2.1 MMOL/L (ref 0.4–2)
LYMPHOCYTES NFR BLD: 1.55 K/UL
LYMPHOCYTES RELATIVE PERCENT: 26 % (ref 24–44)
MAGNESIUM SERPL-MCNC: 1.9 MG/DL (ref 1.8–2.4)
MCH RBC QN AUTO: 32.4 PG (ref 27–31)
MCHC RBC AUTO-ENTMCNC: 31.6 G/DL (ref 32–36)
MCV RBC AUTO: 102.5 FL (ref 78–100)
MONOCYTES NFR BLD: 0.63 K/UL
MONOCYTES NFR BLD: 11 % (ref 0–4)
NEUTROPHILS NFR BLD: 53 % (ref 36–66)
NEUTS SEG NFR BLD: 3.16 K/UL
PLATELET # BLD AUTO: 73 K/UL (ref 140–440)
PLATELET CONFIRMATION: NORMAL
PMV BLD AUTO: 10.1 FL (ref 7.5–11.1)
POTASSIUM SERPL-SCNC: 3.7 MMOL/L (ref 3.5–5.1)
PROT SERPL-MCNC: 6.8 G/DL (ref 6.4–8.2)
PROTHROMBIN TIME: 51.5 SEC (ref 11.7–14.5)
RBC # BLD AUTO: 3.24 M/UL (ref 4.6–6.2)
SODIUM SERPL-SCNC: 144 MMOL/L (ref 135–145)
WBC OTHER # BLD: 6 K/UL (ref 4–10.5)

## 2025-03-05 PROCEDURE — 83605 ASSAY OF LACTIC ACID: CPT

## 2025-03-05 PROCEDURE — 80053 COMPREHEN METABOLIC PANEL: CPT

## 2025-03-05 PROCEDURE — 83880 ASSAY OF NATRIURETIC PEPTIDE: CPT

## 2025-03-05 PROCEDURE — 85610 PROTHROMBIN TIME: CPT

## 2025-03-05 PROCEDURE — 71045 X-RAY EXAM CHEST 1 VIEW: CPT

## 2025-03-05 PROCEDURE — 83735 ASSAY OF MAGNESIUM: CPT

## 2025-03-05 PROCEDURE — 99285 EMERGENCY DEPT VISIT HI MDM: CPT

## 2025-03-05 PROCEDURE — 85025 COMPLETE CBC W/AUTO DIFF WBC: CPT

## 2025-03-05 ASSESSMENT — PAIN DESCRIPTION - LOCATION: LOCATION: ABDOMEN

## 2025-03-05 ASSESSMENT — LIFESTYLE VARIABLES
HOW OFTEN DO YOU HAVE A DRINK CONTAINING ALCOHOL: NEVER
HOW MANY STANDARD DRINKS CONTAINING ALCOHOL DO YOU HAVE ON A TYPICAL DAY: PATIENT DOES NOT DRINK

## 2025-03-05 ASSESSMENT — PAIN - FUNCTIONAL ASSESSMENT
PAIN_FUNCTIONAL_ASSESSMENT: 0-10
PAIN_FUNCTIONAL_ASSESSMENT: PREVENTS OR INTERFERES WITH MANY ACTIVE NOT PASSIVE ACTIVITIES

## 2025-03-05 ASSESSMENT — PAIN DESCRIPTION - PAIN TYPE: TYPE: ACUTE PAIN

## 2025-03-05 ASSESSMENT — PAIN DESCRIPTION - FREQUENCY: FREQUENCY: CONTINUOUS

## 2025-03-05 ASSESSMENT — PAIN DESCRIPTION - DESCRIPTORS: DESCRIPTORS: PRESSURE;CRAMPING

## 2025-03-05 ASSESSMENT — PAIN SCALES - GENERAL: PAINLEVEL_OUTOF10: 7

## 2025-03-05 NOTE — ED TRIAGE NOTES
Arrived per [facility] to room 3 for triage. Tolerated without difficulty. Bed in lowest position. Call light given. Patient using crutches s/p right hip replacement.

## 2025-03-06 ENCOUNTER — HOSPITAL ENCOUNTER (INPATIENT)
Age: 65
LOS: 3 days | Discharge: HOME | DRG: 280 | End: 2025-03-09
Attending: STUDENT IN AN ORGANIZED HEALTH CARE EDUCATION/TRAINING PROGRAM | Admitting: INTERNAL MEDICINE
Payer: MEDICAID

## 2025-03-06 ENCOUNTER — APPOINTMENT (OUTPATIENT)
Dept: CT IMAGING | Age: 65
DRG: 280 | End: 2025-03-06
Attending: STUDENT IN AN ORGANIZED HEALTH CARE EDUCATION/TRAINING PROGRAM
Payer: MEDICAID

## 2025-03-06 VITALS
WEIGHT: 219.58 LBS | TEMPERATURE: 98.3 F | RESPIRATION RATE: 27 BRPM | HEIGHT: 65 IN | SYSTOLIC BLOOD PRESSURE: 120 MMHG | DIASTOLIC BLOOD PRESSURE: 77 MMHG | HEART RATE: 97 BPM | BODY MASS INDEX: 36.58 KG/M2 | OXYGEN SATURATION: 96 %

## 2025-03-06 PROBLEM — R18.8 ASCITES OF LIVER: Status: ACTIVE | Noted: 2025-03-06

## 2025-03-06 LAB
ABO + RH BLD: NORMAL
ALBUMIN SERPL-MCNC: 2.4 G/DL (ref 3.4–5)
ALBUMIN/GLOB SERPL: 0.7 {RATIO} (ref 1.1–2.2)
ALP SERPL-CCNC: 144 U/L (ref 40–129)
ALT SERPL-CCNC: 16 U/L (ref 10–40)
AMMONIA PLAS-SCNC: 63 UMOL/L (ref 16–60)
ANION GAP SERPL CALCULATED.3IONS-SCNC: 8 MMOL/L (ref 9–17)
APAP SERPL-MCNC: <5 UG/ML (ref 10–30)
AST SERPL-CCNC: 41 U/L (ref 15–37)
BASOPHILS # BLD: 0.08 K/UL
BASOPHILS NFR BLD: 1 % (ref 0–1)
BILIRUB SERPL-MCNC: 1.8 MG/DL (ref 0–1)
BILIRUB UR QL STRIP: ABNORMAL
BLOOD BANK SAMPLE EXPIRATION: NORMAL
BLOOD GROUP ANTIBODIES SERPL: NEGATIVE
BUN SERPL-MCNC: 17 MG/DL (ref 7–20)
CALCIUM SERPL-MCNC: 8.3 MG/DL (ref 8.3–10.6)
CHARACTER UR: ABNORMAL
CHLORIDE SERPL-SCNC: 112 MMOL/L (ref 99–110)
CLARITY UR: CLEAR
CO2 SERPL-SCNC: 22 MMOL/L (ref 21–32)
COLOR UR: YELLOW
CREAT SERPL-MCNC: 0.7 MG/DL (ref 0.8–1.3)
EOSINOPHIL # BLD: 0.55 K/UL
EOSINOPHILS RELATIVE PERCENT: 9 % (ref 0–3)
ERYTHROCYTE [DISTWIDTH] IN BLOOD BY AUTOMATED COUNT: 14.3 % (ref 11.7–14.9)
GFR, ESTIMATED: >90 ML/MIN/1.73M2
GLUCOSE BLD-MCNC: 109 MG/DL (ref 74–99)
GLUCOSE BLD-MCNC: 116 MG/DL (ref 74–99)
GLUCOSE BLD-MCNC: 48 MG/DL (ref 74–99)
GLUCOSE BLD-MCNC: 60 MG/DL (ref 74–99)
GLUCOSE BLD-MCNC: 64 MG/DL (ref 74–99)
GLUCOSE BLD-MCNC: 86 MG/DL (ref 74–99)
GLUCOSE BLD-MCNC: 87 MG/DL (ref 74–99)
GLUCOSE BLD-MCNC: 95 MG/DL (ref 74–99)
GLUCOSE SERPL-MCNC: 51 MG/DL (ref 74–99)
GLUCOSE UR STRIP-MCNC: NEGATIVE MG/DL
HCT VFR BLD AUTO: 33 % (ref 42–52)
HGB BLD-MCNC: 10.5 G/DL (ref 13.5–18)
HGB UR QL STRIP.AUTO: NEGATIVE
IMM GRANULOCYTES # BLD AUTO: 0.01 K/UL
IMM GRANULOCYTES NFR BLD: 0 %
INR PPP: 5.8
KETONES UR STRIP-MCNC: ABNORMAL MG/DL
LACTATE BLDV-SCNC: 1.6 MMOL/L (ref 0.4–2)
LEUKOCYTE ESTERASE UR QL STRIP: NEGATIVE
LYMPHOCYTES NFR BLD: 1.48 K/UL
LYMPHOCYTES RELATIVE PERCENT: 25 % (ref 24–44)
MCH RBC QN AUTO: 32.7 PG (ref 27–31)
MCHC RBC AUTO-ENTMCNC: 31.8 G/DL (ref 32–36)
MCV RBC AUTO: 102.8 FL (ref 78–100)
MONOCYTES NFR BLD: 0.61 K/UL
MONOCYTES NFR BLD: 10 % (ref 0–4)
MUCOUS THREADS URNS QL MICRO: ABNORMAL
NEUTROPHILS NFR BLD: 54 % (ref 36–66)
NEUTS SEG NFR BLD: 3.24 K/UL
NITRITE UR QL STRIP: POSITIVE
PARTIAL THROMBOPLASTIN TIME: 52.2 SEC (ref 25.1–37.1)
PH UR STRIP: 5 [PH] (ref 5–8)
PLATELET, FLUORESCENCE: 79 K/UL (ref 140–440)
PMV BLD AUTO: 11.1 FL (ref 7.5–11.1)
POTASSIUM SERPL-SCNC: 3.6 MMOL/L (ref 3.5–5.1)
PROT SERPL-MCNC: 6.1 G/DL (ref 6.4–8.2)
PROT UR STRIP-MCNC: ABNORMAL MG/DL
PROTHROMBIN TIME: 51.7 SEC (ref 11.7–14.5)
RBC # BLD AUTO: 3.21 M/UL (ref 4.6–6.2)
RBC #/AREA URNS HPF: <1 /HPF (ref 0–2)
SODIUM SERPL-SCNC: 142 MMOL/L (ref 136–145)
SP GR UR STRIP: >1.03 (ref 1–1.03)
UROBILINOGEN UR STRIP-ACNC: 2 EU/DL (ref 0–1)
WBC #/AREA URNS HPF: 1 /HPF (ref 0–5)
WBC OTHER # BLD: 6 K/UL (ref 4–10.5)

## 2025-03-06 PROCEDURE — 82105 ALPHA-FETOPROTEIN SERUM: CPT

## 2025-03-06 PROCEDURE — 85730 THROMBOPLASTIN TIME PARTIAL: CPT

## 2025-03-06 PROCEDURE — 6360000004 HC RX CONTRAST MEDICATION: Performed by: INTERNAL MEDICINE

## 2025-03-06 PROCEDURE — 94761 N-INVAS EAR/PLS OXIMETRY MLT: CPT

## 2025-03-06 PROCEDURE — 80053 COMPREHEN METABOLIC PANEL: CPT

## 2025-03-06 PROCEDURE — 81001 URINALYSIS AUTO W/SCOPE: CPT

## 2025-03-06 PROCEDURE — 74177 CT ABD & PELVIS W/CONTRAST: CPT

## 2025-03-06 PROCEDURE — 86900 BLOOD TYPING SEROLOGIC ABO: CPT

## 2025-03-06 PROCEDURE — 6370000000 HC RX 637 (ALT 250 FOR IP): Performed by: EMERGENCY MEDICINE

## 2025-03-06 PROCEDURE — 83605 ASSAY OF LACTIC ACID: CPT

## 2025-03-06 PROCEDURE — 85025 COMPLETE CBC W/AUTO DIFF WBC: CPT

## 2025-03-06 PROCEDURE — 82962 GLUCOSE BLOOD TEST: CPT

## 2025-03-06 PROCEDURE — 6370000000 HC RX 637 (ALT 250 FOR IP): Performed by: INTERNAL MEDICINE

## 2025-03-06 PROCEDURE — 86901 BLOOD TYPING SEROLOGIC RH(D): CPT

## 2025-03-06 PROCEDURE — 85610 PROTHROMBIN TIME: CPT

## 2025-03-06 PROCEDURE — 36415 COLL VENOUS BLD VENIPUNCTURE: CPT

## 2025-03-06 PROCEDURE — 2580000003 HC RX 258: Performed by: INTERNAL MEDICINE

## 2025-03-06 PROCEDURE — 82140 ASSAY OF AMMONIA: CPT

## 2025-03-06 PROCEDURE — 86850 RBC ANTIBODY SCREEN: CPT

## 2025-03-06 PROCEDURE — 1200000000 HC SEMI PRIVATE

## 2025-03-06 PROCEDURE — 80143 DRUG ASSAY ACETAMINOPHEN: CPT

## 2025-03-06 PROCEDURE — 76937 US GUIDE VASCULAR ACCESS: CPT

## 2025-03-06 RX ORDER — ONDANSETRON 4 MG/1
4 TABLET, ORALLY DISINTEGRATING ORAL EVERY 8 HOURS PRN
Status: DISCONTINUED | OUTPATIENT
Start: 2025-03-06 | End: 2025-03-09 | Stop reason: HOSPADM

## 2025-03-06 RX ORDER — INSULIN LISPRO 100 [IU]/ML
0-8 INJECTION, SOLUTION INTRAVENOUS; SUBCUTANEOUS
Status: DISCONTINUED | OUTPATIENT
Start: 2025-03-06 | End: 2025-03-09 | Stop reason: HOSPADM

## 2025-03-06 RX ORDER — PANTOPRAZOLE SODIUM 40 MG/1
40 TABLET, DELAYED RELEASE ORAL EVERY MORNING
Status: DISCONTINUED | OUTPATIENT
Start: 2025-03-06 | End: 2025-03-09 | Stop reason: HOSPADM

## 2025-03-06 RX ORDER — DEXTROSE MONOHYDRATE 100 MG/ML
INJECTION, SOLUTION INTRAVENOUS CONTINUOUS PRN
Status: DISCONTINUED | OUTPATIENT
Start: 2025-03-06 | End: 2025-03-09 | Stop reason: HOSPADM

## 2025-03-06 RX ORDER — ZOLPIDEM TARTRATE 5 MG/1
10 TABLET ORAL ONCE
Status: COMPLETED | OUTPATIENT
Start: 2025-03-06 | End: 2025-03-06

## 2025-03-06 RX ORDER — SODIUM CHLORIDE 0.9 % (FLUSH) 0.9 %
5-40 SYRINGE (ML) INJECTION PRN
Status: DISCONTINUED | OUTPATIENT
Start: 2025-03-06 | End: 2025-03-09 | Stop reason: HOSPADM

## 2025-03-06 RX ORDER — LORAZEPAM 1 MG/1
1 TABLET ORAL 3 TIMES DAILY PRN
Status: DISCONTINUED | OUTPATIENT
Start: 2025-03-06 | End: 2025-03-09 | Stop reason: HOSPADM

## 2025-03-06 RX ORDER — GLUCAGON 1 MG/ML
1 KIT INJECTION PRN
Status: DISCONTINUED | OUTPATIENT
Start: 2025-03-06 | End: 2025-03-09 | Stop reason: HOSPADM

## 2025-03-06 RX ORDER — GLIPIZIDE 5 MG/1
10 TABLET ORAL ONCE
Status: COMPLETED | OUTPATIENT
Start: 2025-03-06 | End: 2025-03-06

## 2025-03-06 RX ORDER — ZOLPIDEM TARTRATE 5 MG/1
5 TABLET ORAL NIGHTLY PRN
Status: DISCONTINUED | OUTPATIENT
Start: 2025-03-06 | End: 2025-03-09 | Stop reason: HOSPADM

## 2025-03-06 RX ORDER — ALBUTEROL SULFATE 90 UG/1
2 INHALANT RESPIRATORY (INHALATION) EVERY 6 HOURS PRN
Status: DISCONTINUED | OUTPATIENT
Start: 2025-03-06 | End: 2025-03-09 | Stop reason: HOSPADM

## 2025-03-06 RX ORDER — SODIUM CHLORIDE 9 MG/ML
INJECTION, SOLUTION INTRAVENOUS PRN
Status: DISCONTINUED | OUTPATIENT
Start: 2025-03-06 | End: 2025-03-09 | Stop reason: HOSPADM

## 2025-03-06 RX ORDER — IOPAMIDOL 755 MG/ML
75 INJECTION, SOLUTION INTRAVASCULAR
Status: COMPLETED | OUTPATIENT
Start: 2025-03-06 | End: 2025-03-06

## 2025-03-06 RX ORDER — ONDANSETRON 2 MG/ML
4 INJECTION INTRAMUSCULAR; INTRAVENOUS EVERY 6 HOURS PRN
Status: DISCONTINUED | OUTPATIENT
Start: 2025-03-06 | End: 2025-03-09 | Stop reason: HOSPADM

## 2025-03-06 RX ORDER — SODIUM CHLORIDE 0.9 % (FLUSH) 0.9 %
5-40 SYRINGE (ML) INJECTION EVERY 12 HOURS SCHEDULED
Status: DISCONTINUED | OUTPATIENT
Start: 2025-03-06 | End: 2025-03-09 | Stop reason: HOSPADM

## 2025-03-06 RX ORDER — ZOLPIDEM TARTRATE 5 MG/1
5 TABLET ORAL ONCE
Status: DISCONTINUED | OUTPATIENT
Start: 2025-03-06 | End: 2025-03-06

## 2025-03-06 RX ADMIN — DEXTROSE MONOHYDRATE 125 ML: 100 INJECTION, SOLUTION INTRAVENOUS at 08:26

## 2025-03-06 RX ADMIN — IOPAMIDOL 75 ML: 755 INJECTION, SOLUTION INTRAVENOUS at 10:00

## 2025-03-06 RX ADMIN — PANTOPRAZOLE SODIUM 40 MG: 40 TABLET, DELAYED RELEASE ORAL at 18:34

## 2025-03-06 RX ADMIN — DEXTROSE MONOHYDRATE 125 ML: 100 INJECTION, SOLUTION INTRAVENOUS at 06:34

## 2025-03-06 RX ADMIN — LORAZEPAM 1 MG: 1 TABLET ORAL at 18:35

## 2025-03-06 RX ADMIN — ZOLPIDEM TARTRATE 5 MG: 5 TABLET, COATED ORAL at 01:31

## 2025-03-06 RX ADMIN — GLIPIZIDE 10 MG: 5 TABLET ORAL at 01:31

## 2025-03-06 NOTE — CARE COORDINATION
Met with pt at bedside to initiate discharge planning. Introduced self and role of CM. Pt very lethargic and having difficulty staying awake to have conversation. Pt from home with s/o, dtr, son, nephew and a few other family members. Pt lives in a 3 level house and uses all levels to perform ADL's. Pt recently had a hip replacement and is using crutches. Pt denies any difficulty going up and down the stairs at home. Pt has a shower/tub and a SC but doesn't use the SC. Pt is an active . Has had home care before but doesn't think he needs it at this time. Has a PCP and insurance. Denies any difficulty obtaining/affording food or medications. Plan at this time is for pt to return home at discharge. CM following.     03/06/25 1108   Service Assessment   Patient Orientation Other (see comment)  (Lethargic but oriented)   Cognition Alert   History Provided By Patient;Medical Record   Primary Caregiver Self   Accompanied By/Relationship N/A   Support Systems Children;Family Members;Spouse/Significant Other   Patient's Healthcare Decision Maker is: Legal Next of Kin   PCP Verified by CM Yes   Last Visit to PCP Within last 6 months   Prior Functional Level Independent in ADLs/IADLs   Current Functional Level Assistance with the following:;Bathing;Toileting;Mobility  (Per hospital policy)   Can patient return to prior living arrangement Yes   Ability to make needs known: Good   Family able to assist with home care needs: Yes   Would you like for me to discuss the discharge plan with any other family members/significant others, and if so, who? No   Financial Resources Medicaid   Community Resources None   CM/SW Referral Other (see comment)  (Discharge planning assessment)   Social/Functional History   Lives With Family;Significant other   Type of Home House   Home Layout Multi-level   Home Access Stairs to enter with rails   Bathroom Shower/Tub Tub/Shower unit   Bathroom Equipment Grab bars in shower;Shower chair   Home

## 2025-03-06 NOTE — ED PROVIDER NOTES
[facility] EMERGENCY DEPARTMENT  TRANSFER OF CARE NOTE  EMERGENCY DEPARTMENT ENCOUNTER          Pt Name: Vasile Meyer  MRN: [id number]  Birthdate 1960  Date of encounter: 3/5/2025  Physician: Ifeoma Petersen DO, FACEP      Transfer of Care Information:   Provider Signing out:   Eleno Fuentes    Receiving Physician: Ifeoma Petersen  Sign out time: 1904      Brief history:  Decompensated cirrhosis.  He has not had blood in his stool and checks it since he takes blood thinners, Xarelto.  He has not drank alcohol in 40 years.  Took Xarelto last this morning.  Has dried blood on lips which he attributes to his teeth which he needs to get work done on    ED Course as of 03/06/25 0159   Wed Mar 05, 2025   1904 Cirrhosis, elevated INR [CB]   1905 INR(!!): 5.7  Rivoroxaban [CB]   1905 ascites [CB]   1906 CBC, Xray, then transfer to [place], clinically not bleeding   [CB]   1925 Pending labs and reassessment for final disposition I signed out care to oncoming provider at the end of my shift. [SC]   1934 WBC: 6.0 [CB]   1934 Hemoglobin Quant(!): 10.5 [CB]   1934 IMPRESSION: Nonacute portable chest radiograph with low lung volumes.         [CB]   1946 Lactic Acid(!!): 2.1 [CB]   1947 WBC: 6.0 [CB]   1947 Hematocrit(!): 33.2 [CB]   2158 Discharging a patient right now and patient will get that bed at [facility] [CB]   Thu Mar 06, 2025   0055 Requested home dose of ambien 10mg and glipizide 10mg which were ordered. [CB]      ED Course User Index  [CB] Ifeoma Petersen DO  [SC] Eleno Wu MD       Items pending that need to be checked:  CBC, Xray      Tentative Impression of patient:  Decompensated cirrhosis  Elevated INR    Expected disposition of patient:  Pending results, transferred.        Additional Assessment and results:   I have personally performed a face to face diagnostic evaluation on this patient. The patient's initial evaluation and plan have been 
Patient    Limitations to history : None    Patient was given the following medications:  Medications   glipiZIDE (GLUCOTROL) tablet 10 mg (10 mg Oral Given 3/6/25 0131)   zolpidem (AMBIEN) tablet 10 mg (5 mg Oral Given 3/6/25 0131)       Social Determinants : None    Records Reviewed : Inpatient Notes  , Outpatient Notes  , and Outpatient Labs & Studies        Disposition: Pending    I am the Primary Clinician of Record.    Clinical Impression:  1. Cirrhosis of liver with ascites, unspecified hepatic cirrhosis type (HCC)    2. Elevated INR    3. Anemia, unspecified type      Disposition referral (if applicable):  No follow-up provider specified.  Disposition medications (if applicable):  Discharge Medication List as of 3/6/2025  1:32 AM        ED Provider Disposition Time  DISPOSITION Decision To Transfer 03/05/2025 08:12:03 PM   DISPOSITION CONDITION Stable           Comment: Please note this report has been produced using speech recognition software and may contain errors related to that system including errors in grammar, punctuation, and spelling, as well as words and phrases that may be inappropriate.  Efforts were made to edit the dictations.        Eleno Wu MD  03/06/25 8419

## 2025-03-06 NOTE — H&P
pk-yrs)     Types: Cigarettes, Cigars     Start date:      Quit date:      Years since quittin.2    Smokeless tobacco: Current     Types: Chew   Vaping Use    Vaping status: Never Used   Substance and Sexual Activity    Alcohol use: No    Drug use: No    Sexual activity: Yes     Partners: Female   Social History Narrative    Do you donate blood or plasma? No    Caffeine intake? None    Advance directive? No    Is blood transfusion acceptable in an emergency? yes       Medications:   Medications:    sodium chloride flush  5-40 mL IntraVENous 2 times per day      Infusions:    sodium chloride       PRN Meds: sodium chloride flush, 5-40 mL, PRN  sodium chloride, , PRN  ondansetron, 4 mg, Q8H PRN   Or  ondansetron, 4 mg, Q6H PRN        Labs      CBC:   Recent Labs     25   WBC 6.0   HGB 10.5*   PLT 73*     BMP:    Recent Labs     25      K 3.7   *   CO2 23   BUN 16   CREATININE 0.7*   GLUCOSE 145*     Hepatic:   Recent Labs     25   AST 35   ALT 14   BILITOT 1.1*   ALKPHOS 166*     Lipids:   Lab Results   Component Value Date/Time    CHOL 156 2025 01:20 PM    HDL 64 2025 01:20 PM    TRIG 88 2025 01:20 PM     Hemoglobin A1C:   Lab Results   Component Value Date/Time    LABA1C 6.5 2025 01:20 PM     TSH:   Lab Results   Component Value Date/Time    TSH 1.04 2025 01:20 PM    TSH 1.69 2017 09:10 AM     Troponin:   Lab Results   Component Value Date/Time    TROPONINT <0.010 2017 04:25 PM    TROPONINT <0.010 09/15/2017 01:17 PM    TROPONINT <0.010 09/15/2017 07:43 AM     Lactic Acid:   Recent Labs     25   LACTA 2.1*     BNP:   Recent Labs     25   PROBNP 53     UA:  Lab Results   Component Value Date/Time    NITRU NEGATIVE 2017 05:05 PM    NITRU NEGATIVE 2011 02:30 PM    COLORU YELLOW 2017 05:05 PM    PHUR 7.0 2017 05:05 PM    WBCUA NO CELLS SEEN 2017 05:05 PM    RBCUA NO

## 2025-03-06 NOTE — CARE COORDINATION
Attempted to meet with pt at bedside to initiate discharge planning. Pt snoring. Called pt name from bedside and he did not easily arouse.     0950: Attempted to meet with pt at bedside again and pt not currently in room. Will attempt to see pt again later.

## 2025-03-07 ENCOUNTER — APPOINTMENT (OUTPATIENT)
Dept: INTERVENTIONAL RADIOLOGY/VASCULAR | Age: 65
DRG: 280 | End: 2025-03-07
Attending: STUDENT IN AN ORGANIZED HEALTH CARE EDUCATION/TRAINING PROGRAM
Payer: MEDICAID

## 2025-03-07 LAB
AFP-TM SERPL-MCNC: 2 NG/ML (ref 0–9)
ALBUMIN FLD-MCNC: 0.4 G/DL
ALBUMIN SERPL-MCNC: 2.6 G/DL (ref 3.4–5)
ALBUMIN/GLOB SERPL: 0.7 {RATIO} (ref 1.1–2.2)
ALP SERPL-CCNC: 150 U/L (ref 40–129)
ALT SERPL-CCNC: 20 U/L (ref 10–40)
AMYLASE FLD-CCNC: 13 U/L
ANION GAP SERPL CALCULATED.3IONS-SCNC: 7 MMOL/L (ref 9–17)
APPEARANCE FLD: NORMAL
AST SERPL-CCNC: 49 U/L (ref 15–37)
BASOPHILS # BLD: 0.1 K/UL
BASOPHILS NFR BLD: 2 % (ref 0–1)
BILIRUB SERPL-MCNC: 1.4 MG/DL (ref 0–1)
BLASTS NFR FLD: 0 %
BODY FLD TYPE: NORMAL
BUN SERPL-MCNC: 18 MG/DL (ref 7–20)
CALCIUM SERPL-MCNC: 8 MG/DL (ref 8.3–10.6)
CHLORIDE SERPL-SCNC: 111 MMOL/L (ref 99–110)
CLOT CHECK: NORMAL
CO2 SERPL-SCNC: 22 MMOL/L (ref 21–32)
COLOR FLD: YELLOW
CREAT SERPL-MCNC: 0.8 MG/DL (ref 0.8–1.3)
EOSINOPHIL # BLD: 0.48 K/UL
EOSINOPHIL NFR FLD: 0 %
EOSINOPHILS RELATIVE PERCENT: 9 % (ref 0–3)
ERYTHROCYTE [DISTWIDTH] IN BLOOD BY AUTOMATED COUNT: 14.1 % (ref 11.7–14.9)
FERRITIN SERPL-MCNC: 69 NG/ML (ref 30–400)
GFR, ESTIMATED: >90 ML/MIN/1.73M2
GLUCOSE BLD-MCNC: 127 MG/DL (ref 74–99)
GLUCOSE BLD-MCNC: 147 MG/DL (ref 74–99)
GLUCOSE BLD-MCNC: 165 MG/DL (ref 74–99)
GLUCOSE BLD-MCNC: 90 MG/DL (ref 74–99)
GLUCOSE BLD-MCNC: 98 MG/DL (ref 74–99)
GLUCOSE FLD-MCNC: 143 MG/DL
GLUCOSE SERPL-MCNC: 100 MG/DL (ref 74–99)
HAV IGM SERPL QL IA: NONREACTIVE
HBV CORE IGM SERPL QL IA: NONREACTIVE
HBV SURFACE AG SERPL QL IA: NONREACTIVE
HCT VFR BLD AUTO: 33.5 % (ref 42–52)
HCV AB SERPL QL IA: NONREACTIVE
HGB BLD-MCNC: 10.6 G/DL (ref 13.5–18)
IMM GRANULOCYTES # BLD AUTO: 0.02 K/UL
IMM GRANULOCYTES NFR BLD: 0 %
INR PPP: 2
LDH FLD L TO P-CCNC: 50 U/L
LYMPHOCYTES NFR BLD: 1.48 K/UL
LYMPHOCYTES NFR FLD: 75 %
LYMPHOCYTES RELATIVE PERCENT: 27 % (ref 24–44)
MCH RBC QN AUTO: 32.3 PG (ref 27–31)
MCHC RBC AUTO-ENTMCNC: 31.6 G/DL (ref 32–36)
MCV RBC AUTO: 102.1 FL (ref 78–100)
MESOTHELIAL CELLS BODY FLUID: 9 %
MONOCYTES NFR BLD: 0.58 K/UL
MONOCYTES NFR BLD: 11 % (ref 0–4)
MONOCYTES NFR FLD: 19 %
NEUTROPHILS NFR BLD: 51 % (ref 36–66)
NEUTROPHILS NFR FLD: 6 %
NEUTS SEG NFR BLD: 2.78 K/UL
PLATELET, FLUORESCENCE: 72 K/UL (ref 140–440)
PMV BLD AUTO: 11.1 FL (ref 7.5–11.1)
POTASSIUM SERPL-SCNC: 4 MMOL/L (ref 3.5–5.1)
PROT FLD-MCNC: 0.8 G/DL
PROT SERPL-MCNC: 6.2 G/DL (ref 6.4–8.2)
PROTHROMBIN TIME: 22.8 SEC (ref 11.7–14.5)
RBC # BLD AUTO: 3.28 M/UL (ref 4.6–6.2)
RBC # FLD: <2000 CELLS/UL
SODIUM SERPL-SCNC: 140 MMOL/L (ref 136–145)
SPECIMEN TYPE: NORMAL
UNIDENT CELLS NFR FLD: 0 %
WBC # FLD: 306 CELLS/UL
WBC OTHER # BLD: 5.4 K/UL (ref 4–10.5)

## 2025-03-07 PROCEDURE — 82945 GLUCOSE OTHER FLUID: CPT

## 2025-03-07 PROCEDURE — 6370000000 HC RX 637 (ALT 250 FOR IP): Performed by: PHYSICIAN ASSISTANT

## 2025-03-07 PROCEDURE — 87075 CULTR BACTERIA EXCEPT BLOOD: CPT

## 2025-03-07 PROCEDURE — 83615 LACTATE (LD) (LDH) ENZYME: CPT

## 2025-03-07 PROCEDURE — 1200000000 HC SEMI PRIVATE

## 2025-03-07 PROCEDURE — 6360000002 HC RX W HCPCS: Performed by: RADIOLOGY

## 2025-03-07 PROCEDURE — 82728 ASSAY OF FERRITIN: CPT

## 2025-03-07 PROCEDURE — 83516 IMMUNOASSAY NONANTIBODY: CPT

## 2025-03-07 PROCEDURE — 86039 ANTINUCLEAR ANTIBODIES (ANA): CPT

## 2025-03-07 PROCEDURE — 87205 SMEAR GRAM STAIN: CPT

## 2025-03-07 PROCEDURE — 49083 ABD PARACENTESIS W/IMAGING: CPT | Performed by: RADIOLOGY

## 2025-03-07 PROCEDURE — 86038 ANTINUCLEAR ANTIBODIES: CPT

## 2025-03-07 PROCEDURE — 0W9G3ZZ DRAINAGE OF PERITONEAL CAVITY, PERCUTANEOUS APPROACH: ICD-10-PCS | Performed by: RADIOLOGY

## 2025-03-07 PROCEDURE — 80053 COMPREHEN METABOLIC PANEL: CPT

## 2025-03-07 PROCEDURE — 6360000002 HC RX W HCPCS

## 2025-03-07 PROCEDURE — 49083 ABD PARACENTESIS W/IMAGING: CPT

## 2025-03-07 PROCEDURE — 89051 BODY FLUID CELL COUNT: CPT

## 2025-03-07 PROCEDURE — 94761 N-INVAS EAR/PLS OXIMETRY MLT: CPT

## 2025-03-07 PROCEDURE — 85610 PROTHROMBIN TIME: CPT

## 2025-03-07 PROCEDURE — 6370000000 HC RX 637 (ALT 250 FOR IP): Performed by: STUDENT IN AN ORGANIZED HEALTH CARE EDUCATION/TRAINING PROGRAM

## 2025-03-07 PROCEDURE — 94640 AIRWAY INHALATION TREATMENT: CPT

## 2025-03-07 PROCEDURE — 84157 ASSAY OF PROTEIN OTHER: CPT

## 2025-03-07 PROCEDURE — 85025 COMPLETE CBC W/AUTO DIFF WBC: CPT

## 2025-03-07 PROCEDURE — 94664 DEMO&/EVAL PT USE INHALER: CPT

## 2025-03-07 PROCEDURE — C1729 CATH, DRAINAGE: HCPCS

## 2025-03-07 PROCEDURE — 88108 CYTOPATH CONCENTRATE TECH: CPT | Performed by: PATHOLOGY

## 2025-03-07 PROCEDURE — 82390 ASSAY OF CERULOPLASMIN: CPT

## 2025-03-07 PROCEDURE — 82962 GLUCOSE BLOOD TEST: CPT

## 2025-03-07 PROCEDURE — 6370000000 HC RX 637 (ALT 250 FOR IP): Performed by: INTERNAL MEDICINE

## 2025-03-07 PROCEDURE — 80074 ACUTE HEPATITIS PANEL: CPT

## 2025-03-07 PROCEDURE — 87070 CULTURE OTHR SPECIMN AEROBIC: CPT

## 2025-03-07 PROCEDURE — 82042 OTHER SOURCE ALBUMIN QUAN EA: CPT

## 2025-03-07 PROCEDURE — 2500000003 HC RX 250 WO HCPCS: Performed by: INTERNAL MEDICINE

## 2025-03-07 PROCEDURE — P9047 ALBUMIN (HUMAN), 25%, 50ML: HCPCS

## 2025-03-07 PROCEDURE — P9047 ALBUMIN (HUMAN), 25%, 50ML: HCPCS | Performed by: RADIOLOGY

## 2025-03-07 PROCEDURE — 82103 ALPHA-1-ANTITRYPSIN TOTAL: CPT

## 2025-03-07 PROCEDURE — 88305 TISSUE EXAM BY PATHOLOGIST: CPT | Performed by: PATHOLOGY

## 2025-03-07 PROCEDURE — 82150 ASSAY OF AMYLASE: CPT

## 2025-03-07 PROCEDURE — 36415 COLL VENOUS BLD VENIPUNCTURE: CPT

## 2025-03-07 RX ORDER — FUROSEMIDE 40 MG/1
40 TABLET ORAL DAILY
Status: DISCONTINUED | OUTPATIENT
Start: 2025-03-07 | End: 2025-03-09 | Stop reason: HOSPADM

## 2025-03-07 RX ORDER — LIDOCAINE HYDROCHLORIDE 10 MG/ML
INJECTION, SOLUTION EPIDURAL; INFILTRATION; INTRACAUDAL; PERINEURAL PRN
Status: COMPLETED | OUTPATIENT
Start: 2025-03-07 | End: 2025-03-07

## 2025-03-07 RX ORDER — SPIRONOLACTONE 50 MG/1
100 TABLET, FILM COATED ORAL DAILY
Status: DISCONTINUED | OUTPATIENT
Start: 2025-03-07 | End: 2025-03-09 | Stop reason: HOSPADM

## 2025-03-07 RX ORDER — LACTULOSE 10 G/15ML
20 SOLUTION ORAL 3 TIMES DAILY
Status: DISCONTINUED | OUTPATIENT
Start: 2025-03-07 | End: 2025-03-09 | Stop reason: HOSPADM

## 2025-03-07 RX ORDER — ALBUMIN (HUMAN) 12.5 G/50ML
SOLUTION INTRAVENOUS CONTINUOUS PRN
Status: COMPLETED | OUTPATIENT
Start: 2025-03-07 | End: 2025-03-07

## 2025-03-07 RX ORDER — PHYTONADIONE 5 MG/1
5 TABLET ORAL ONCE
Status: COMPLETED | OUTPATIENT
Start: 2025-03-07 | End: 2025-03-07

## 2025-03-07 RX ADMIN — PHYTONADIONE 5 MG: 5 TABLET ORAL at 11:18

## 2025-03-07 RX ADMIN — LORAZEPAM 1 MG: 1 TABLET ORAL at 09:37

## 2025-03-07 RX ADMIN — ALBUTEROL SULFATE 2 PUFF: 90 AEROSOL, METERED RESPIRATORY (INHALATION) at 04:27

## 2025-03-07 RX ADMIN — ALBUMIN (HUMAN) 12.5 G: 0.25 INJECTION, SOLUTION INTRAVENOUS at 16:15

## 2025-03-07 RX ADMIN — LIDOCAINE HYDROCHLORIDE 10 ML: 10 INJECTION, SOLUTION EPIDURAL; INFILTRATION; INTRACAUDAL; PERINEURAL at 15:49

## 2025-03-07 RX ADMIN — LACTULOSE 20 G: 20 SOLUTION ORAL at 13:51

## 2025-03-07 RX ADMIN — SODIUM CHLORIDE, PRESERVATIVE FREE 10 ML: 5 INJECTION INTRAVENOUS at 09:33

## 2025-03-07 RX ADMIN — SODIUM CHLORIDE, PRESERVATIVE FREE 10 ML: 5 INJECTION INTRAVENOUS at 21:57

## 2025-03-07 RX ADMIN — ZOLPIDEM TARTRATE 5 MG: 5 TABLET, FILM COATED ORAL at 01:23

## 2025-03-07 RX ADMIN — ONDANSETRON 4 MG: 4 TABLET, ORALLY DISINTEGRATING ORAL at 07:13

## 2025-03-07 RX ADMIN — PANTOPRAZOLE SODIUM 40 MG: 40 TABLET, DELAYED RELEASE ORAL at 09:31

## 2025-03-07 RX ADMIN — LACTULOSE 20 G: 20 SOLUTION ORAL at 09:31

## 2025-03-07 RX ADMIN — LACTULOSE 20 G: 20 SOLUTION ORAL at 21:56

## 2025-03-07 ASSESSMENT — PAIN DESCRIPTION - LOCATION
LOCATION: ABDOMEN;LEG
LOCATION: LEG

## 2025-03-07 ASSESSMENT — PAIN DESCRIPTION - ONSET
ONSET: ON-GOING
ONSET: ON-GOING

## 2025-03-07 ASSESSMENT — PAIN DESCRIPTION - FREQUENCY
FREQUENCY: CONTINUOUS
FREQUENCY: CONTINUOUS

## 2025-03-07 ASSESSMENT — PAIN SCALES - GENERAL
PAINLEVEL_OUTOF10: 3
PAINLEVEL_OUTOF10: 2

## 2025-03-07 ASSESSMENT — PAIN DESCRIPTION - DESCRIPTORS
DESCRIPTORS: ACHING
DESCRIPTORS: ACHING

## 2025-03-07 ASSESSMENT — PAIN DESCRIPTION - PAIN TYPE
TYPE: CHRONIC PAIN
TYPE: CHRONIC PAIN;SURGICAL PAIN

## 2025-03-07 ASSESSMENT — PAIN - FUNCTIONAL ASSESSMENT
PAIN_FUNCTIONAL_ASSESSMENT: PREVENTS OR INTERFERES SOME ACTIVE ACTIVITIES AND ADLS
PAIN_FUNCTIONAL_ASSESSMENT: ACTIVITIES ARE NOT PREVENTED

## 2025-03-07 ASSESSMENT — PAIN DESCRIPTION - ORIENTATION
ORIENTATION: OTHER (COMMENT)
ORIENTATION: RIGHT

## 2025-03-07 NOTE — CONSULTS
Consult completed. Nexiva 20g 1.75\" peripheral IV initiated into left forearm x 1 attempt using ultrasound guided technique. Brisk blood return, flushes without resistance. Patient tolerated well. Primary nurse notified.     Consult the Vascular Access Team for questions, concerns, or change in patient's condition.   
at 3/6/2025  6:18 AM  A fib, on Xarelto  S/p right hip replacement 1/3/25  GERD, s/p Nissen 2018  T2DM    Discussion:  64 y.o. male with h/o DVT, atrial fibrillation, on anticoagulation, anxiety disorder, insomnia, MAHESH, GERD  who presented to hospital w/ abdominal distention. GI consulted for cirrosis of liver with ascites. Patient reported having right hip replacement 1/3/2025 since then started noticing bilateral lower extremity swelling.  Patient also reported noticing abdominal distention for the last 1 week.  Initially he thought it would get better, but got scared and wanted to get evaluated.  Patient reported having good p.o. intake, thinks he is constipated, last BM 2 days ago.  Patient reports pain only with palpation.  Admits to feeling nauseous, denied any vomiting.  Denied any fever, has chills.  Dry cough.  Admits to shortness of breath only while coughing. With intermittent sputum production.  Patient reports that he used to drink alcohol 10-12 years ago, But was never heavy drinker.  Denying any urinary complaints, denied any hematuria, melena or BRBPR.  His last BM was a day ago.  Denies NSAIDs.    Pt had had liver biopsy 2/13-path showed bridging reticulin collapse and fibrosis consistent with cirrhosis. Moderate steatosis.     Plan:  - CT abd/pelvis pending  - Full liver workup  - Lactulose with goal of 2-3 BM daily  - May need paracentesis with IR  - Continue PPI    Patient clinical, biochemical, and radiological information discussed with Dr. Copeland. He agrees with the assessment and plan above.    Patient Active Problem List   Diagnosis Code    Hyperlipidemia E78.5    Anxiety F41.9    Hypertension I10    PAF (paroxysmal atrial fibrillation) (HCC) I48.0    Palpitations R00.2    Precordial pain R07.2    MAHESH (obstructive sleep apnea) G47.33    DM type 2 (diabetes mellitus, type 2) (HCC) E11.9    Moderate episode of recurrent major depressive disorder (HCC) F33.1    Monoallelic mutation of SLC6A4 
to Encounter   Medication Sig Dispense Refill    rivaroxaban (XARELTO) 20 MG TABS tablet Take 1 tablet by mouth daily (with breakfast) 90 tablet 1    glipiZIDE (GLUCOTROL) 10 MG tablet Take 1 tablet by mouth 2 times daily (before meals)      VENTOLIN  (90 Base) MCG/ACT inhaler       acetaminophen (TYLENOL) 325 MG tablet Take 2 tablets by mouth every 6 hours as needed for Pain      fluticasone (FLONASE) 50 MCG/ACT nasal spray by Each Nare route as needed for Rhinitis       pantoprazole (PROTONIX) 40 MG tablet Take 1 tablet by mouth every morning      LORazepam (ATIVAN) 1 MG tablet Take 1 tablet by mouth 3 times daily.      zolpidem (AMBIEN) 10 MG tablet Take 1 tablet by mouth nightly as needed for Sleep.      cetirizine (ZYRTEC) 10 MG tablet Take 1 tablet by mouth daily         Review of Systems:  A 10 point review of systems was conducted and was negative with the exception of what is noted above.     Vital Signs:  Vitals:    03/07/25 1356 03/07/25 1550 03/07/25 1602 03/07/25 1614   BP: (!) 90/55 133/76 113/68 110/66   Pulse: (!) 102 96 92 86   Resp: 24      Temp: 98.4 °F (36.9 °C)      TempSrc: Oral      SpO2: 95% 99%     Weight:       Height:            Laboratory:  Recent Labs     03/05/25  1815 03/06/25  0618 03/07/25  0930   WBC 6.0 6.0 5.4    142 140   * 112* 111*   CO2 23 22 22   BUN 16 17 18   CREATININE 0.7* 0.7* 0.8   GLUCOSE 145* 51* 100*   INR 5.7* 5.8* 2.0       Medications reviewed: No contraindications for coagulation or sedation plans    IMAGING DATA   None available for review    ASSESSMENT AND PLAN     Pt is a good candidate for paracentesis    Procedure, RBA explained to patient and available family. Informed consent obtained    Electronically signed by Katie Escoto MD on 3/7/2025 at 4:49 PM

## 2025-03-07 NOTE — OR NURSING
Paracentesis completed. 4300cc of michelle fluid removed, 1 L sent to lab.  Albumin 12.5 grams given.  Dry dressing applied.

## 2025-03-08 LAB
ALBUMIN SERPL-MCNC: 2.4 G/DL (ref 3.4–5)
ALBUMIN/GLOB SERPL: 0.7 {RATIO} (ref 1.1–2.2)
ALP SERPL-CCNC: 133 U/L (ref 40–129)
ALT SERPL-CCNC: 17 U/L (ref 10–40)
ANION GAP SERPL CALCULATED.3IONS-SCNC: 7 MMOL/L (ref 9–17)
ANTI-NUCLEAR ANTIBODY (ANA): NEGATIVE
AST SERPL-CCNC: 46 U/L (ref 15–37)
BASOPHILS # BLD: 0.06 K/UL
BASOPHILS NFR BLD: 2 % (ref 0–1)
BILIRUB SERPL-MCNC: 1.2 MG/DL (ref 0–1)
BUN SERPL-MCNC: 17 MG/DL (ref 7–20)
CALCIUM SERPL-MCNC: 7.8 MG/DL (ref 8.3–10.6)
CHLORIDE SERPL-SCNC: 112 MMOL/L (ref 99–110)
CO2 SERPL-SCNC: 22 MMOL/L (ref 21–32)
CREAT SERPL-MCNC: 0.8 MG/DL (ref 0.8–1.3)
EOSINOPHIL # BLD: 0.3 K/UL
EOSINOPHILS RELATIVE PERCENT: 7 % (ref 0–3)
ERYTHROCYTE [DISTWIDTH] IN BLOOD BY AUTOMATED COUNT: 14.1 % (ref 11.7–14.9)
GFR, ESTIMATED: >90 ML/MIN/1.73M2
GLUCOSE BLD-MCNC: 118 MG/DL (ref 74–99)
GLUCOSE BLD-MCNC: 130 MG/DL (ref 74–99)
GLUCOSE BLD-MCNC: 150 MG/DL (ref 74–99)
GLUCOSE BLD-MCNC: 191 MG/DL (ref 74–99)
GLUCOSE SERPL-MCNC: 157 MG/DL (ref 74–99)
HBV SURFACE AB SERPL IA-ACNC: 3.5 MIU/ML
HCT VFR BLD AUTO: 29.7 % (ref 42–52)
HGB BLD-MCNC: 9.4 G/DL (ref 13.5–18)
IMM GRANULOCYTES # BLD AUTO: 0.01 K/UL
IMM GRANULOCYTES NFR BLD: 0 %
INR PPP: 2
LYMPHOCYTES NFR BLD: 1 K/UL
LYMPHOCYTES RELATIVE PERCENT: 25 % (ref 24–44)
MCH RBC QN AUTO: 32.6 PG (ref 27–31)
MCHC RBC AUTO-ENTMCNC: 31.6 G/DL (ref 32–36)
MCV RBC AUTO: 103.1 FL (ref 78–100)
MONOCYTES NFR BLD: 0.56 K/UL
MONOCYTES NFR BLD: 14 % (ref 0–4)
NEUTROPHILS NFR BLD: 53 % (ref 36–66)
NEUTS SEG NFR BLD: 2.15 K/UL
PLATELET CONFIRMATION: NORMAL
PLATELET, FLUORESCENCE: 60 K/UL (ref 140–440)
PMV BLD AUTO: 10.5 FL (ref 7.5–11.1)
POTASSIUM SERPL-SCNC: 3.8 MMOL/L (ref 3.5–5.1)
PROT SERPL-MCNC: 5.8 G/DL (ref 6.4–8.2)
PROTHROMBIN TIME: 22.4 SEC (ref 11.7–14.5)
RBC # BLD AUTO: 2.88 M/UL (ref 4.6–6.2)
SODIUM SERPL-SCNC: 142 MMOL/L (ref 136–145)
WBC OTHER # BLD: 4.1 K/UL (ref 4–10.5)

## 2025-03-08 PROCEDURE — 85610 PROTHROMBIN TIME: CPT

## 2025-03-08 PROCEDURE — 36415 COLL VENOUS BLD VENIPUNCTURE: CPT

## 2025-03-08 PROCEDURE — 85025 COMPLETE CBC W/AUTO DIFF WBC: CPT

## 2025-03-08 PROCEDURE — 86708 HEPATITIS A ANTIBODY: CPT

## 2025-03-08 PROCEDURE — 2500000003 HC RX 250 WO HCPCS: Performed by: INTERNAL MEDICINE

## 2025-03-08 PROCEDURE — 6370000000 HC RX 637 (ALT 250 FOR IP): Performed by: PHYSICIAN ASSISTANT

## 2025-03-08 PROCEDURE — 86317 IMMUNOASSAY INFECTIOUS AGENT: CPT

## 2025-03-08 PROCEDURE — 6370000000 HC RX 637 (ALT 250 FOR IP): Performed by: STUDENT IN AN ORGANIZED HEALTH CARE EDUCATION/TRAINING PROGRAM

## 2025-03-08 PROCEDURE — 94761 N-INVAS EAR/PLS OXIMETRY MLT: CPT

## 2025-03-08 PROCEDURE — 6370000000 HC RX 637 (ALT 250 FOR IP): Performed by: INTERNAL MEDICINE

## 2025-03-08 PROCEDURE — 80053 COMPREHEN METABOLIC PANEL: CPT

## 2025-03-08 PROCEDURE — 1200000000 HC SEMI PRIVATE

## 2025-03-08 PROCEDURE — 82962 GLUCOSE BLOOD TEST: CPT

## 2025-03-08 RX ADMIN — INSULIN LISPRO 2 UNITS: 100 INJECTION, SOLUTION INTRAVENOUS; SUBCUTANEOUS at 17:00

## 2025-03-08 RX ADMIN — LACTULOSE 20 G: 20 SOLUTION ORAL at 08:40

## 2025-03-08 RX ADMIN — PANTOPRAZOLE SODIUM 40 MG: 40 TABLET, DELAYED RELEASE ORAL at 08:40

## 2025-03-08 RX ADMIN — RIVAROXABAN 20 MG: 20 TABLET, FILM COATED ORAL at 17:00

## 2025-03-08 RX ADMIN — LORAZEPAM 1 MG: 1 TABLET ORAL at 17:51

## 2025-03-08 RX ADMIN — FUROSEMIDE 40 MG: 40 TABLET ORAL at 08:40

## 2025-03-08 RX ADMIN — LORAZEPAM 1 MG: 1 TABLET ORAL at 08:40

## 2025-03-08 RX ADMIN — SODIUM CHLORIDE, PRESERVATIVE FREE 10 ML: 5 INJECTION INTRAVENOUS at 08:42

## 2025-03-08 RX ADMIN — SPIRONOLACTONE 100 MG: 50 TABLET ORAL at 08:40

## 2025-03-08 RX ADMIN — LACTULOSE 20 G: 20 SOLUTION ORAL at 16:59

## 2025-03-08 RX ADMIN — ZOLPIDEM TARTRATE 5 MG: 5 TABLET, FILM COATED ORAL at 00:17

## 2025-03-08 RX ADMIN — SODIUM CHLORIDE, PRESERVATIVE FREE 10 ML: 5 INJECTION INTRAVENOUS at 20:14

## 2025-03-08 RX ADMIN — ONDANSETRON 4 MG: 4 TABLET, ORALLY DISINTEGRATING ORAL at 20:46

## 2025-03-08 RX ADMIN — LACTULOSE 20 G: 20 SOLUTION ORAL at 20:12

## 2025-03-08 RX ADMIN — SODIUM CHLORIDE, PRESERVATIVE FREE 10 ML: 5 INJECTION INTRAVENOUS at 08:40

## 2025-03-08 ASSESSMENT — PAIN DESCRIPTION - ORIENTATION
ORIENTATION: RIGHT
ORIENTATION: RIGHT

## 2025-03-08 ASSESSMENT — PAIN SCALES - GENERAL
PAINLEVEL_OUTOF10: 4
PAINLEVEL_OUTOF10: 2

## 2025-03-08 ASSESSMENT — PAIN DESCRIPTION - PAIN TYPE
TYPE: CHRONIC PAIN
TYPE: CHRONIC PAIN

## 2025-03-08 ASSESSMENT — PAIN DESCRIPTION - LOCATION
LOCATION: LEG
LOCATION: LEG

## 2025-03-08 ASSESSMENT — PAIN DESCRIPTION - FREQUENCY
FREQUENCY: CONTINUOUS
FREQUENCY: CONTINUOUS

## 2025-03-08 ASSESSMENT — PAIN DESCRIPTION - DESCRIPTORS: DESCRIPTORS: ACHING

## 2025-03-08 ASSESSMENT — PAIN DESCRIPTION - ONSET: ONSET: ON-GOING

## 2025-03-08 ASSESSMENT — PAIN - FUNCTIONAL ASSESSMENT: PAIN_FUNCTIONAL_ASSESSMENT: ACTIVITIES ARE NOT PREVENTED

## 2025-03-08 NOTE — PLAN OF CARE
Problem: Safety - Adult  Goal: Free from fall injury  3/8/2025 1057 by Juany Infante LPN  Outcome: Progressing  3/8/2025 0018 by Denzel Alicia RN  Outcome: Progressing     Problem: Pain  Goal: Verbalizes/displays adequate comfort level or baseline comfort level  3/8/2025 1057 by Juayn Infante LPN  Outcome: Progressing  3/8/2025 0018 by Denzel Alicia RN  Outcome: Progressing     Problem: Chronic Conditions and Co-morbidities  Goal: Patient's chronic conditions and co-morbidity symptoms are monitored and maintained or improved  3/8/2025 1057 by Juany Infante LPN  Outcome: Progressing  3/8/2025 0018 by Denzel Alicia RN  Outcome: Progressing

## 2025-03-08 NOTE — PROGRESS NOTES
[facility]    [facility]  Progress Note  3/8/2025  12:24 PM  ___________________________________________________________________________  Patient:    Vasile Meyer  : 1960   64 y.o.             MRN: [id number]  Admitted: 3/6/2025  2:25 AM ATT: Quinn Jolly MD   1122/1122-A  AdmitDx: Ascites of liver [R18.8]  PCP: Nikunj Sharpe MD  ___________________________________________________________________________  ASSESSMENT AND PLAN :  64-year-old male with history of liver cirrhosis likely multifactorial with history of alcohol use disorder has not been drinking for several years and MAFLD with liver biopsy showing cirrhosis in  with steatosis. It appears that patient has been relatively well compensated up until recently. Started having increasing abdominal distention which required him to get evaluation. CT showed findings as below consistent with cirrhosis, portal hypertension and splenomegaly. Patient has not been seen by GI provider in over 7 to 8 years. He does have diabetes, obesity.     Impression:   - Decompensated Cirrhosis w/ MAFLD - ?alcohol  - HE: +asterixis on exam today.   - Ascites: Large volume s/p paracentesis, neg for SBP.   - Low total protein (<1.5) Child Stokes 10 but Tbili <3. No SBP PPx  - Varices: Last EGD:  Needs screening for EV  - HCC screening: Last imaging - CT w/o focal liver lesions  - Paroxysmal A-fib on Xarelto  - Thrombocytopenia  - Recent R hip replacement  - DM2    MELD 3.0: 15 at 3/8/2025  1:35 AM  MELD-Na: 15 at 3/8/2025  1:35 AM  Calculated from:  Serum Creatinine: 0.8 mg/dL (Using min of 1 mg/dL) at 3/8/2025  1:35 AM  Serum Sodium: 142 mmol/L (Using max of 137 mmol/L) at 3/8/2025  1:35 AM  Total Bilirubin: 1.2 mg/dL at 3/8/2025  1:35 AM  Serum Albumin: 2.4 g/dL at 3/8/2025  1:35 AM  INR(ratio): 2 at 3/8/2025  1:35 AM  Age at listing (hypothetical): 64 years  Sex: Male at 3/8/2025  1:35 AM    Discussion: Pt with known 
    V2.0    [facility] [place]      Name:  Vasile Meyer /Age/Sex: 1960  (64 y.o. male)   MRN & CSN:  [id number] & [id number] Encounter Date/Time: 3/8/2025 12:24 PM EST   Location:  [address] [place] [address] PCP: Nikunj Sharpe MD     Attending:Quinn Jolly MD       Hospital Day: 3    Assessment and Recommendations   Vasile Meyer is a 64 y.o. male with pmh of paroxysmal atrial fibrillation on Xarelto, DVT, MAHESH, GERD, insomnia, anxiety disorder who presents with Ascites of liver      # Decompensated cirrhosis: Presented with abdominal distention and pain, CT abdomen/pelvis hepatocellular disease/cirrhosis moderate to large amount of ascites portal hypertension, LFTs within normal range, Remote history of alcohol use, Consulted GI, Consulted IR for  paracentesis, held on 3/6 due to supratherapeutic INR currently INR 2.0 IR did paracentesis and removed 4.3 L and received albumin, GI evaluated recommended IR paracentesis, diuretics post paracentesis with Lasix 40 and spironolactone 100, lactulose titrated to 4 bowel movements per day, 2 g/day salt restriction, no NSAIDs, okay to use acetaminophen at 2 g/day, EGD outpatient for varices screening.  Continue to monitor.     # Supratherapeutic INR: PT/INR-59.5/5.7, Patient is on Xarelto-held also given 1 dose of vitamin K, currently at 2.0 restarted Xarelto     # Chronic thrombocytopenia secondary to above continue to monitor     # Acute encephalopathy suspected secondary to hepatic encephalopathy: Ammonia elevated to 63, improving with lactulose     # Acute anemia suspected secondary to above trend H&H if hemoglobin less than 7 to transfuse but no active bleeding        # Paroxysmal atrial fibrillation: Patient is on Xarelto, held due to supratherapeutic INR restarted Xarelto     #  Insomnia-on zolpidem     # Depression/anxiety disorder  -Patient is chronically on lorazepam-consider tapering     # MAHESH-noncompliant with CPAP     # Severe GERD-status post 
4 Eyes Skin Assessment     NAME:  Vasile Meyer  YOB: 1960  MEDICAL RECORD NUMBER:  [id number]    The patient is being assessed for  Admission    I agree that at least one RN has performed a thorough Head to Toe Skin Assessment on the patient. ALL assessment sites listed below have been assessed.      Areas assessed by both nurses:    Head, Face, Ears, Shoulders, Back, Chest, Arms, Elbows, Hands, Sacrum. Buttock, Coccyx, Ischium, Legs. Feet and Heels, and Under Medical Devices         Does the Patient have a Wound? No noted wound(s)     There is scattered rash that pt reports he scratches himself  Schuyler Prevention initiated by RN: No  Wound Care Orders initiated by RN: No    Pressure Injury (Stage 3,4, Unstageable, DTI, NWPT, and Complex wounds) if present, place Wound referral order by RN under : No    New Ostomies, if present place, Ostomy referral order under : No     Nurse 1 eSignature: Electronically signed by Denzel Alicia RN on 3/6/25 at 3:29 AM EST    **SHARE this note so that the co-signing nurse can place an eSignature**    Nurse 2 eSignature: Electronically signed by Karley Voss LPN on 3/6/25 at 3:55 AM EST    
I am working as a [facility] clinical instructor today. I have a student assigned to this patient.  
IR consult received, per Dr Escoto the pt INR needs to be under 2 for the procedure.  Updated Dr Jolly and the pt nurse Xiao SANCHES  
SN Guillermo, CSC, I am assigned to this patient.   
TRANSFER - OUT REPORT:    Verbal report given to Juany  on Vasile Meyer being transferred to  for routine post-op       Report consisted of patient's Situation, Background, Assessment and   Recommendations(SBAR).     Information from the following report(s) Nurse Handoff Report was reviewed with the receiving nurse.    Opportunity for questions and clarification was provided.      Patient transported with:   Registered Nurse    
today     Plan:  - Discussed with patient about evaluation of chronic liver disease including workup for chronic liver disease outpatient. Most likely etiology is MAFLD. He did have liver Bx appx in  w/ cirrhosis and steatosis.   - IR paracentesis. F/u labs for SBP  - Diuretics post paracentesis.  Start with Lasix 40 and spironolactone 100 mg daily.  - Lactulose titrate to 4 BM/day.  If has recurrence then can add Rifaximin 550 mg BID in future  - 2 g/day salt restriction.  Consult dietitian  - No NSAIDs.  Okay to use acetaminophen as needed up to 2 g/day  - EGD outpatient for varices screening        [facility]    [facility]  Progress Note  3/7/2025  7:31 AM  ___________________________________________________________________________  Patient:    Vasile Meyer  : 1960   64 y.o.             MRN: [id number]  Admitted: 3/6/2025  2:25 AM ATT: Quinn Jolly MD   1122/1122-A  AdmitDx: Ascites of liver [R18.8]  PCP: Nikunj Sharpe MD  ___________________________________________________________________________  ASSESSMENT AND PLAN :    Impression:   Decompensated Cirrhosis, secondary to previous alcohol use/MAFLD   > HE: +asterixis   > Ascites: large volume  > Varices: Last EGD:  needs screening  > HCC screening: Last imaging - CT w/o focal liver lesions     MELD-Na: MELD 3.0: 27 at 3/6/2025  6:18 AM  MELD-Na: 28 at 3/6/2025  6:18 AM  Calculated from:  Serum Creatinine: 0.7 mg/dL (Using min of 1 mg/dL) at 3/6/2025  6:18 AM  Serum Sodium: 142 mmol/L (Using max of 137 mmol/L) at 3/6/2025  6:18 AM  Total Bilirubin: 1.8 mg/dL at 3/6/2025  6:18 AM  Serum Albumin: 2.4 g/dL at 3/6/2025  6:18 AM  INR(ratio): 5.8 at 3/6/2025  6:18 AM  Age at listing (hypothetical): 64 years  Sex: Male at 3/6/2025  6:18 AM  2. Paroxysmal A-fib on Xarelto  3. Thrombocytopenia  4.S/p right hip replacement 1/3/25  5. GERD, s/p Nissen   6. T2DM    Discussion:  64 y.o. male with h/o DVT, 
consistent with portal hypertension 3.  There is asymmetric subcutaneous fat stranding within the right lateral lower chest wall/right upper quadrant lateral abdominal wall. Additional findings as above.  Dictated and Electronically Signed By: Abdiel Montalvo MD 3/6/2025 10:15        XR CHEST PORTABLE    Result Date: 3/5/2025  PORTABLE CHEST X-RAY Date of Service: 3/5/2025 19:56 HISTORY: 64 years male with shortness of breath. COMPARISON:  None. FINDINGS: Low lung volumes. No pleural effusions. The cardiac and mediastinal silhouettes are within normal limits. The lungs are clear. The bony thorax is unremarkable. IMPRESSION: Nonacute portable chest radiograph with low lung volumes.  Dictated and Electronically Signed By: Conrado Tamez DO 3/5/2025 19:20          CBC:   Recent Labs     03/05/25 1815 03/06/25 0618 03/07/25  0930   WBC 6.0 6.0 5.4   HGB 10.5* 10.5* 10.6*   PLT 73*  --   --      BMP:    Recent Labs     03/05/25 1815 03/06/25  0618 03/07/25  0930    142 140   K 3.7 3.6 4.0   * 112* 111*   CO2 23 22 22   BUN 16 17 18   CREATININE 0.7* 0.7* 0.8   GLUCOSE 145* 51* 100*     Hepatic:   Recent Labs     03/05/25 1815 03/06/25  0618 03/07/25  0930   AST 35 41* 49*   ALT 14 16 20   BILITOT 1.1* 1.8* 1.4*   ALKPHOS 166* 144* 150*     Lipids:   Lab Results   Component Value Date/Time    CHOL 156 01/30/2025 01:20 PM    HDL 64 01/30/2025 01:20 PM    TRIG 88 01/30/2025 01:20 PM     Hemoglobin A1C:   Lab Results   Component Value Date/Time    LABA1C 6.5 01/30/2025 01:20 PM     TSH:   Lab Results   Component Value Date/Time    TSH 1.04 01/30/2025 01:20 PM    TSH 1.69 03/28/2017 09:10 AM     Troponin:   Lab Results   Component Value Date/Time    TROPONINT <0.010 11/05/2017 04:25 PM    TROPONINT <0.010 09/15/2017 01:17 PM    TROPONINT <0.010 09/15/2017 07:43 AM     Lactic Acid:   Recent Labs     03/05/25  1815 03/06/25  0620   LACTA 2.1* 1.6     BNP:   Recent Labs     03/05/25 1815   PROBNP 53

## 2025-03-09 VITALS
DIASTOLIC BLOOD PRESSURE: 61 MMHG | TEMPERATURE: 97.6 F | RESPIRATION RATE: 18 BRPM | SYSTOLIC BLOOD PRESSURE: 114 MMHG | HEART RATE: 92 BPM | BODY MASS INDEX: 34.67 KG/M2 | WEIGHT: 208.1 LBS | OXYGEN SATURATION: 98 % | HEIGHT: 65 IN

## 2025-03-09 LAB
ALBUMIN SERPL-MCNC: 2.6 G/DL (ref 3.4–5)
ALBUMIN/GLOB SERPL: 0.7 {RATIO} (ref 1.1–2.2)
ALP SERPL-CCNC: 138 U/L (ref 40–129)
ALT SERPL-CCNC: 19 U/L (ref 10–40)
ANION GAP SERPL CALCULATED.3IONS-SCNC: 8 MMOL/L (ref 9–17)
AST SERPL-CCNC: 50 U/L (ref 15–37)
BASOPHILS # BLD: 0.07 K/UL
BASOPHILS NFR BLD: 2 % (ref 0–1)
BILIRUB SERPL-MCNC: 1.3 MG/DL (ref 0–1)
BUN SERPL-MCNC: 15 MG/DL (ref 7–20)
CALCIUM SERPL-MCNC: 8 MG/DL (ref 8.3–10.6)
CERULOPLASMIN SERPL-MCNC: 14 MG/DL (ref 15–30)
CHLORIDE SERPL-SCNC: 110 MMOL/L (ref 99–110)
CO2 SERPL-SCNC: 22 MMOL/L (ref 21–32)
CREAT SERPL-MCNC: 0.8 MG/DL (ref 0.8–1.3)
EOSINOPHIL # BLD: 0.35 K/UL
EOSINOPHILS RELATIVE PERCENT: 8 % (ref 0–3)
ERYTHROCYTE [DISTWIDTH] IN BLOOD BY AUTOMATED COUNT: 14 % (ref 11.7–14.9)
GFR, ESTIMATED: >90 ML/MIN/1.73M2
GLUCOSE BLD-MCNC: 149 MG/DL (ref 74–99)
GLUCOSE SERPL-MCNC: 188 MG/DL (ref 74–99)
HCT VFR BLD AUTO: 32.2 % (ref 42–52)
HGB BLD-MCNC: 10.2 G/DL (ref 13.5–18)
IMM GRANULOCYTES # BLD AUTO: 0.01 K/UL
IMM GRANULOCYTES NFR BLD: 0 %
INR PPP: 2.7
LYMPHOCYTES NFR BLD: 1.18 K/UL
LYMPHOCYTES RELATIVE PERCENT: 26 % (ref 24–44)
MCH RBC QN AUTO: 33 PG (ref 27–31)
MCHC RBC AUTO-ENTMCNC: 31.7 G/DL (ref 32–36)
MCV RBC AUTO: 104.2 FL (ref 78–100)
MITOCHONDRIA M2 IGG SER-ACNC: 5.2 UNITS (ref 0–24.9)
MONOCYTES NFR BLD: 0.54 K/UL
MONOCYTES NFR BLD: 12 % (ref 0–4)
NEUTROPHILS NFR BLD: 54 % (ref 36–66)
NEUTS SEG NFR BLD: 2.47 K/UL
PLATELET, FLUORESCENCE: 74 K/UL (ref 140–440)
PMV BLD AUTO: 10.1 FL (ref 7.5–11.1)
POTASSIUM SERPL-SCNC: 3.8 MMOL/L (ref 3.5–5.1)
PROT SERPL-MCNC: 6.4 G/DL (ref 6.4–8.2)
PROTHROMBIN TIME: 29.4 SEC (ref 11.7–14.5)
RBC # BLD AUTO: 3.09 M/UL (ref 4.6–6.2)
SMOOTH MUSCLE ANTIBODY: 14 UNITS (ref 0–19)
SODIUM SERPL-SCNC: 140 MMOL/L (ref 136–145)
WBC OTHER # BLD: 4.6 K/UL (ref 4–10.5)

## 2025-03-09 PROCEDURE — 85610 PROTHROMBIN TIME: CPT

## 2025-03-09 PROCEDURE — 36415 COLL VENOUS BLD VENIPUNCTURE: CPT

## 2025-03-09 PROCEDURE — 85025 COMPLETE CBC W/AUTO DIFF WBC: CPT

## 2025-03-09 PROCEDURE — 6370000000 HC RX 637 (ALT 250 FOR IP): Performed by: PHYSICIAN ASSISTANT

## 2025-03-09 PROCEDURE — 94761 N-INVAS EAR/PLS OXIMETRY MLT: CPT

## 2025-03-09 PROCEDURE — 82962 GLUCOSE BLOOD TEST: CPT

## 2025-03-09 PROCEDURE — 6370000000 HC RX 637 (ALT 250 FOR IP): Performed by: STUDENT IN AN ORGANIZED HEALTH CARE EDUCATION/TRAINING PROGRAM

## 2025-03-09 PROCEDURE — 6370000000 HC RX 637 (ALT 250 FOR IP): Performed by: INTERNAL MEDICINE

## 2025-03-09 PROCEDURE — 2500000003 HC RX 250 WO HCPCS: Performed by: INTERNAL MEDICINE

## 2025-03-09 PROCEDURE — 80053 COMPREHEN METABOLIC PANEL: CPT

## 2025-03-09 RX ORDER — CETIRIZINE HYDROCHLORIDE 10 MG/1
10 TABLET ORAL DAILY
Status: DISCONTINUED | OUTPATIENT
Start: 2025-03-09 | End: 2025-03-09 | Stop reason: HOSPADM

## 2025-03-09 RX ORDER — LACTULOSE 10 G/15ML
20 SOLUTION ORAL 3 TIMES DAILY
Qty: 2700 ML | Refills: 0 | Status: SHIPPED | OUTPATIENT
Start: 2025-03-09 | End: 2025-04-08

## 2025-03-09 RX ORDER — FUROSEMIDE 40 MG/1
40 TABLET ORAL DAILY
Qty: 60 TABLET | Refills: 3 | Status: SHIPPED | OUTPATIENT
Start: 2025-03-10

## 2025-03-09 RX ORDER — SPIRONOLACTONE 100 MG/1
100 TABLET, FILM COATED ORAL DAILY
Qty: 30 TABLET | Refills: 3 | Status: SHIPPED | OUTPATIENT
Start: 2025-03-10

## 2025-03-09 RX ADMIN — SODIUM CHLORIDE, PRESERVATIVE FREE 10 ML: 5 INJECTION INTRAVENOUS at 08:48

## 2025-03-09 RX ADMIN — CETIRIZINE HYDROCHLORIDE 10 MG: 10 TABLET, FILM COATED ORAL at 08:44

## 2025-03-09 RX ADMIN — ZOLPIDEM TARTRATE 5 MG: 5 TABLET, FILM COATED ORAL at 00:08

## 2025-03-09 RX ADMIN — FUROSEMIDE 40 MG: 40 TABLET ORAL at 08:44

## 2025-03-09 RX ADMIN — PANTOPRAZOLE SODIUM 40 MG: 40 TABLET, DELAYED RELEASE ORAL at 08:44

## 2025-03-09 RX ADMIN — SPIRONOLACTONE 100 MG: 50 TABLET ORAL at 08:44

## 2025-03-09 RX ADMIN — LACTULOSE 20 G: 20 SOLUTION ORAL at 08:43

## 2025-03-09 NOTE — DISCHARGE SUMMARY
V2.0  Discharge Summary    Name:  Vasile Meyer /Age/Sex: 1960 (64 y.o. male)   Admit Date: 3/6/2025  Discharge Date: 3/9/25    MRN & CSN:  [id number] & [id number] Encounter Date and Time 3/9/25 9:15 AM EDT    Attending:  Quinn Jolly MD Discharging Provider: Quinn Jolly MD       Hospital Course:     Brief HPI: Vasile Meyer is a 64 y.o. male who presented with  pmh of paroxysmal atrial fibrillation on Xarelto, DVT, MAHESH, GERD, insomnia, anxiety disorder who presents with Ascites of liver     Brief Problem Based Course:   # Decompensated cirrhosis: Presented with abdominal distention and pain, CT abdomen/pelvis hepatocellular disease/cirrhosis moderate to large amount of ascites portal hypertension, LFTs within normal range, Remote history of alcohol use, Consulted GI, Consulted IR for  paracentesis, held on 3/6 due to supratherapeutic INR currently INR 2.0 IR did paracentesis and removed 4.3 L and received albumin, diuretics post paracentesis with Lasix 40 and spironolactone 100, lactulose titrated to 4 bowel movements per day, 2 g/day salt restriction, no NSAIDs, okay to use acetaminophen at 2 g/day, EGD outpatient for varices screening.  Continued to monitor.  Once patient symptoms improved discharge patient in a stable condition to follow-up with PCP and GI.     # Supratherapeutic INR: PT/INR-59.5/5.7, Patient is on Xarelto-held also given 1 dose of vitamin K, currently at 2.0 restarted Xarelto     # Chronic thrombocytopenia secondary to above continued to monitor     # Acute encephalopathy suspected secondary to hepatic encephalopathy: Ammonia elevated to 63, improved with lactulose     # Acute anemia suspected secondary to above trend H&H if hemoglobin less than 7 to transfuse but no active bleeding     # Paroxysmal atrial fibrillation: Patient is on Xarelto, held due to supratherapeutic INR restarted Xarelto     #  Insomnia-on zolpidem     # Depression/anxiety

## 2025-03-09 NOTE — DISCHARGE INSTRUCTIONS
Continue Lactulose , Hold if >3 Bowel Movements or diarrhea, and call PCP or GI  Continue Lasix and aldactone and Follow up with GI in 1 week.

## 2025-03-10 LAB
MICROORGANISM SPEC CULT: NORMAL
MICROORGANISM/AGENT SPEC: NORMAL
SERVICE CMNT-IMP: NORMAL
SPECIMEN DESCRIPTION: NORMAL

## 2025-03-12 LAB
HEP A AB: POSITIVE
MISCELLANEOUS LAB TEST RESULT: NORMAL
TEST NAME: NORMAL

## 2025-03-13 LAB — NON-GYN CYTOLOGY REPORT: NORMAL

## 2025-03-19 ENCOUNTER — HOSPITAL ENCOUNTER (EMERGENCY)
Age: 65
Discharge: HOME | End: 2025-03-19
Attending: EMERGENCY MEDICINE
Payer: MEDICAID

## 2025-03-19 VITALS
WEIGHT: 203 LBS | BODY MASS INDEX: 33.82 KG/M2 | HEART RATE: 102 BPM | SYSTOLIC BLOOD PRESSURE: 134 MMHG | RESPIRATION RATE: 22 BRPM | TEMPERATURE: 98.4 F | DIASTOLIC BLOOD PRESSURE: 85 MMHG | HEIGHT: 65 IN | OXYGEN SATURATION: 99 %

## 2025-03-19 DIAGNOSIS — J01.90 ACUTE SINUSITIS, RECURRENCE NOT SPECIFIED, UNSPECIFIED LOCATION: Primary | ICD-10-CM

## 2025-03-19 PROCEDURE — 99283 EMERGENCY DEPT VISIT LOW MDM: CPT

## 2025-03-19 PROCEDURE — 6370000000 HC RX 637 (ALT 250 FOR IP): Performed by: EMERGENCY MEDICINE

## 2025-03-19 RX ORDER — FLUTICASONE PROPIONATE 50 MCG
1 SPRAY, SUSPENSION (ML) NASAL DAILY PRN
Qty: 16 G | Refills: 3 | Status: SHIPPED | OUTPATIENT
Start: 2025-03-19

## 2025-03-19 RX ORDER — AMOXICILLIN 500 MG/1
500 CAPSULE ORAL 2 TIMES DAILY
Qty: 14 CAPSULE | Refills: 0 | Status: SHIPPED | OUTPATIENT
Start: 2025-03-19 | End: 2025-03-26

## 2025-03-19 RX ORDER — AMOXICILLIN 500 MG/1
500 CAPSULE ORAL ONCE
Status: COMPLETED | OUTPATIENT
Start: 2025-03-19 | End: 2025-03-19

## 2025-03-19 RX ADMIN — AMOXICILLIN 500 MG: 500 CAPSULE ORAL at 04:40

## 2025-03-19 ASSESSMENT — ENCOUNTER SYMPTOMS: SINUS PRESSURE: 1

## 2025-03-19 ASSESSMENT — PAIN - FUNCTIONAL ASSESSMENT: PAIN_FUNCTIONAL_ASSESSMENT: NONE - DENIES PAIN

## 2025-03-19 NOTE — ED PROVIDER NOTES
[facility] EMERGENCY DEPARTMENT  EMERGENCY DEPARTMENT ENCOUNTER      Pt Name: Vasile Meyer  MRN: [id number]  Birthdate 1960  Date of evaluation: 3/19/2025  Provider: Liberty Campbell MD    CHIEF COMPLAINT       Chief Complaint   Patient presents with    Cold Symptoms    Pharyngitis    Ear Pain    Cough         HISTORY OF PRESENT ILLNESS      Vasile Meyer is a 64 y.o. male who presents to the emergency department  for   Chief Complaint   Patient presents with    Cold Symptoms    Pharyngitis    Ear Pain    Cough       64-year-old male presents complaining of some sinus pressure and drainage.  States been going on for couple weeks and \"getting worse.\"  Denies any difficulty breathing.  Is also having some coughing.  No fever, chills other constitutional infections.  No trauma to his face or nose.  He is not using anything specifically over the counter.          Nursing Notes, Triage Notes & Vital Signs were reviewed.      REVIEW OF SYSTEMS    (2-9 systems for level 4, 10 or more for level 5)     Review of Systems   HENT:  Positive for sinus pressure.        Except as noted above the remainder of the review of systems was reviewed and negative.       PAST MEDICAL HISTORY     Past Medical History:   Diagnosis Date    A-fib ([facility])     Anxiety     \"Severe Anxiety Problems\"    Arthritis     \"Hands, Back, Right Hip\"    Arthritis of right hip 03/26/2019    Dr Starks    Back pain     \"All The Time\"    Depression     Diabetes mellitus ([facility]) Dx 2000's    Environmental allergies     GERD (gastroesophageal reflux disease)     Glaucoma     H/O cardiovascular stress test 8/08, 12/3/13    12/3/13 result:  Normal perfusion in the distribution of all coronaries, normal LV size and function, EF 61%.    H/O echocardiogram 9/15/17,4/15/08    Mild concentric LVH with normal systolic function. EF 50-55% Trace TR  No evidence of pericardial effusion.     Hiatal hernia     Hiatal hernia     History of cardiac monitoring

## 2025-03-19 NOTE — DISCHARGE INSTRUCTIONS
You may try medications like sinus rinses, nasal saline to help with symptoms.  Please not use medicated sinus products like Afrin or Sudafed for more than 3 days in a row as this might cause rebound symptoms.    Please help with a primary care physician for further evaluation management.    If you develop any worsening or concerning symptoms, please seek immediate medical evaluation

## 2025-04-03 ENCOUNTER — APPOINTMENT (OUTPATIENT)
Dept: GENERAL RADIOLOGY | Age: 65
DRG: 283 | End: 2025-04-03
Payer: MEDICAID

## 2025-04-03 ENCOUNTER — APPOINTMENT (OUTPATIENT)
Dept: CT IMAGING | Age: 65
DRG: 283 | End: 2025-04-03
Payer: MEDICAID

## 2025-04-03 ENCOUNTER — HOSPITAL ENCOUNTER (INPATIENT)
Age: 65
LOS: 2 days | Discharge: HOME | DRG: 283 | End: 2025-04-05
Attending: EMERGENCY MEDICINE | Admitting: HOSPITALIST
Payer: MEDICAID

## 2025-04-03 DIAGNOSIS — J20.9 ACUTE BRONCHITIS, UNSPECIFIED ORGANISM: ICD-10-CM

## 2025-04-03 DIAGNOSIS — D69.6 THROMBOCYTOPENIA: Primary | ICD-10-CM

## 2025-04-03 DIAGNOSIS — R79.89 INCREASED AMMONIA LEVEL: ICD-10-CM

## 2025-04-03 DIAGNOSIS — J01.10 ACUTE NON-RECURRENT FRONTAL SINUSITIS: ICD-10-CM

## 2025-04-03 DIAGNOSIS — K76.82 HEPATIC ENCEPHALOPATHY (HCC): ICD-10-CM

## 2025-04-03 LAB
ALBUMIN SERPL-MCNC: 3.4 G/DL (ref 3.4–5)
ALBUMIN/GLOB SERPL: 0.8 {RATIO}
ALP SERPL-CCNC: 128 U/L (ref 40–129)
ALT SERPL-CCNC: 27 U/L (ref 10–40)
AMMONIA PLAS-SCNC: 107 UMOL/L (ref 16–60)
AMPHET UR QL SCN: NEGATIVE
ANION GAP SERPL CALCULATED.3IONS-SCNC: 14 MMOL/L (ref 9–17)
AST SERPL-CCNC: 40 U/L (ref 15–37)
B PARAP IS1001 DNA NPH QL NAA+NON-PROBE: NOT DETECTED
B PERT DNA SPEC QL NAA+PROBE: NOT DETECTED
BARBITURATES UR QL SCN: NEGATIVE
BASOPHILS # BLD: 0.08 K/UL
BASOPHILS NFR BLD: 1 % (ref 0–1)
BENZODIAZ UR QL: POSITIVE
BILIRUB SERPL-MCNC: 1.1 MG/DL (ref 0–1)
BILIRUB UR QL STRIP: NEGATIVE
BNP SERPL-MCNC: <36 PG/ML (ref 0–125)
BUN SERPL-MCNC: 27 MG/DL (ref 7–20)
C PNEUM DNA NPH QL NAA+NON-PROBE: NOT DETECTED
CALCIUM SERPL-MCNC: 8.8 MG/DL (ref 8.3–10.6)
CANNABINOIDS UR QL SCN: NEGATIVE
CHLORIDE SERPL-SCNC: 105 MMOL/L (ref 99–110)
CK SERPL-CCNC: 129 U/L (ref 26–192)
CLARITY UR: CLEAR
CO2 SERPL-SCNC: 17 MMOL/L (ref 21–32)
COCAINE UR QL SCN: NEGATIVE
COLOR UR: YELLOW
COMMENT: NORMAL
CREAT SERPL-MCNC: 1 MG/DL (ref 0.8–1.3)
EKG ATRIAL RATE: 103 BPM
EKG DIAGNOSIS: NORMAL
EKG P AXIS: 10 DEGREES
EKG P-R INTERVAL: 130 MS
EKG Q-T INTERVAL: 340 MS
EKG QRS DURATION: 82 MS
EKG QTC CALCULATION (BAZETT): 445 MS
EKG R AXIS: 17 DEGREES
EKG T AXIS: 12 DEGREES
EKG VENTRICULAR RATE: 103 BPM
EOSINOPHIL # BLD: 0.32 K/UL
EOSINOPHILS RELATIVE PERCENT: 5 % (ref 0–3)
ERYTHROCYTE [DISTWIDTH] IN BLOOD BY AUTOMATED COUNT: 13.5 % (ref 11.7–14.9)
ETHANOLAMINE SERPL-MCNC: <10 MG/DL (ref 0–0.08)
FENTANYL UR QL: NEGATIVE
FLUAV RNA NPH QL NAA+NON-PROBE: NOT DETECTED
FLUBV RNA NPH QL NAA+NON-PROBE: NOT DETECTED
GFR, ESTIMATED: 84 ML/MIN/1.73M2
GLUCOSE BLD-MCNC: 173 MG/DL (ref 74–99)
GLUCOSE BLD-MCNC: 196 MG/DL (ref 74–99)
GLUCOSE SERPL-MCNC: 160 MG/DL (ref 74–99)
GLUCOSE UR STRIP-MCNC: NEGATIVE MG/DL
HADV DNA NPH QL NAA+NON-PROBE: NOT DETECTED
HCOV 229E RNA NPH QL NAA+NON-PROBE: NOT DETECTED
HCOV HKU1 RNA NPH QL NAA+NON-PROBE: NOT DETECTED
HCOV NL63 RNA NPH QL NAA+NON-PROBE: NOT DETECTED
HCOV OC43 RNA NPH QL NAA+NON-PROBE: NOT DETECTED
HCT VFR BLD AUTO: 36.1 % (ref 42–52)
HGB BLD-MCNC: 11.9 G/DL (ref 13.5–18)
HGB UR QL STRIP.AUTO: NEGATIVE
HMPV RNA NPH QL NAA+NON-PROBE: NOT DETECTED
HPIV1 RNA NPH QL NAA+NON-PROBE: NOT DETECTED
HPIV2 RNA NPH QL NAA+NON-PROBE: NOT DETECTED
HPIV3 RNA NPH QL NAA+NON-PROBE: NOT DETECTED
HPIV4 RNA NPH QL NAA+NON-PROBE: NOT DETECTED
IMM GRANULOCYTES # BLD AUTO: 0.01 K/UL
IMM GRANULOCYTES NFR BLD: 0 %
KETONES UR STRIP-MCNC: NEGATIVE MG/DL
LACTATE BLDV-SCNC: 1.8 MMOL/L (ref 0.4–2)
LEUKOCYTE ESTERASE UR QL STRIP: NEGATIVE
LYMPHOCYTES NFR BLD: 1.47 K/UL
LYMPHOCYTES RELATIVE PERCENT: 23 % (ref 24–44)
M PNEUMO DNA NPH QL NAA+NON-PROBE: NOT DETECTED
MCH RBC QN AUTO: 32 PG (ref 27–31)
MCHC RBC AUTO-ENTMCNC: 33 G/DL (ref 32–36)
MCV RBC AUTO: 97 FL (ref 78–100)
MONOCYTES NFR BLD: 0.69 K/UL
MONOCYTES NFR BLD: 11 % (ref 0–4)
NEUTROPHILS NFR BLD: 61 % (ref 36–66)
NEUTS SEG NFR BLD: 3.95 K/UL
NITRITE UR QL STRIP: NEGATIVE
OPIATES UR QL SCN: NEGATIVE
OXYCODONE UR QL SCN: NEGATIVE
PH UR STRIP: 6 [PH] (ref 5–8)
PLATELET # BLD AUTO: 91 K/UL (ref 140–440)
PLATELET CONFIRMATION: NORMAL
PMV BLD AUTO: 10.9 FL (ref 7.5–11.1)
POTASSIUM SERPL-SCNC: 4.3 MMOL/L (ref 3.5–5.1)
PROT SERPL-MCNC: 7.8 G/DL (ref 6.4–8.2)
PROT UR STRIP-MCNC: NEGATIVE MG/DL
RBC # BLD AUTO: 3.72 M/UL (ref 4.6–6.2)
RSV RNA NPH QL NAA+NON-PROBE: NOT DETECTED
RV+EV RNA NPH QL NAA+NON-PROBE: NOT DETECTED
SARS-COV-2 RNA NPH QL NAA+NON-PROBE: NOT DETECTED
SODIUM SERPL-SCNC: 136 MMOL/L (ref 136–145)
SP GR UR STRIP: 1.02 (ref 1–1.03)
SPECIMEN DESCRIPTION: NORMAL
TEST INFORMATION: ABNORMAL
TSH SERPL DL<=0.05 MIU/L-ACNC: 1.51 UIU/ML (ref 0.27–4.2)
UROBILINOGEN UR STRIP-ACNC: 1 EU/DL (ref 0–1)
WBC OTHER # BLD: 6.5 K/UL (ref 4–10.5)

## 2025-04-03 PROCEDURE — 93010 ELECTROCARDIOGRAM REPORT: CPT | Performed by: INTERNAL MEDICINE

## 2025-04-03 PROCEDURE — G0378 HOSPITAL OBSERVATION PER HR: HCPCS

## 2025-04-03 PROCEDURE — 80307 DRUG TEST PRSMV CHEM ANLYZR: CPT

## 2025-04-03 PROCEDURE — 1200000000 HC SEMI PRIVATE

## 2025-04-03 PROCEDURE — 6360000004 HC RX CONTRAST MEDICATION: Performed by: EMERGENCY MEDICINE

## 2025-04-03 PROCEDURE — 6370000000 HC RX 637 (ALT 250 FOR IP): Performed by: NURSE PRACTITIONER

## 2025-04-03 PROCEDURE — 0202U NFCT DS 22 TRGT SARS-COV-2: CPT

## 2025-04-03 PROCEDURE — 99285 EMERGENCY DEPT VISIT HI MDM: CPT

## 2025-04-03 PROCEDURE — 93005 ELECTROCARDIOGRAM TRACING: CPT | Performed by: EMERGENCY MEDICINE

## 2025-04-03 PROCEDURE — G0480 DRUG TEST DEF 1-7 CLASSES: HCPCS

## 2025-04-03 PROCEDURE — 84443 ASSAY THYROID STIM HORMONE: CPT

## 2025-04-03 PROCEDURE — 85025 COMPLETE CBC W/AUTO DIFF WBC: CPT

## 2025-04-03 PROCEDURE — 81003 URINALYSIS AUTO W/O SCOPE: CPT

## 2025-04-03 PROCEDURE — 83605 ASSAY OF LACTIC ACID: CPT

## 2025-04-03 PROCEDURE — 2580000003 HC RX 258: Performed by: NURSE PRACTITIONER

## 2025-04-03 PROCEDURE — 82550 ASSAY OF CK (CPK): CPT

## 2025-04-03 PROCEDURE — 71045 X-RAY EXAM CHEST 1 VIEW: CPT

## 2025-04-03 PROCEDURE — 83880 ASSAY OF NATRIURETIC PEPTIDE: CPT

## 2025-04-03 PROCEDURE — 71275 CT ANGIOGRAPHY CHEST: CPT

## 2025-04-03 PROCEDURE — 82140 ASSAY OF AMMONIA: CPT

## 2025-04-03 PROCEDURE — 94640 AIRWAY INHALATION TREATMENT: CPT

## 2025-04-03 PROCEDURE — 6370000000 HC RX 637 (ALT 250 FOR IP): Performed by: EMERGENCY MEDICINE

## 2025-04-03 PROCEDURE — 80053 COMPREHEN METABOLIC PANEL: CPT

## 2025-04-03 PROCEDURE — 82962 GLUCOSE BLOOD TEST: CPT

## 2025-04-03 RX ORDER — MAGNESIUM SULFATE IN WATER 40 MG/ML
2000 INJECTION, SOLUTION INTRAVENOUS PRN
Status: DISCONTINUED | OUTPATIENT
Start: 2025-04-03 | End: 2025-04-05 | Stop reason: HOSPADM

## 2025-04-03 RX ORDER — POLYETHYLENE GLYCOL 3350 17 G/17G
17 POWDER, FOR SOLUTION ORAL DAILY PRN
Status: DISCONTINUED | OUTPATIENT
Start: 2025-04-03 | End: 2025-04-05 | Stop reason: HOSPADM

## 2025-04-03 RX ORDER — SODIUM CHLORIDE 0.9 % (FLUSH) 0.9 %
10 SYRINGE (ML) INJECTION PRN
Status: DISCONTINUED | OUTPATIENT
Start: 2025-04-03 | End: 2025-04-05 | Stop reason: HOSPADM

## 2025-04-03 RX ORDER — LACTULOSE 10 G/15ML
20 SOLUTION ORAL ONCE
Status: COMPLETED | OUTPATIENT
Start: 2025-04-03 | End: 2025-04-03

## 2025-04-03 RX ORDER — LORAZEPAM 1 MG/1
1 TABLET ORAL 3 TIMES DAILY
Status: DISCONTINUED | OUTPATIENT
Start: 2025-04-03 | End: 2025-04-05 | Stop reason: HOSPADM

## 2025-04-03 RX ORDER — IOPAMIDOL 755 MG/ML
100 INJECTION, SOLUTION INTRAVASCULAR
Status: COMPLETED | OUTPATIENT
Start: 2025-04-03 | End: 2025-04-03

## 2025-04-03 RX ORDER — SODIUM CHLORIDE 9 MG/ML
INJECTION, SOLUTION INTRAVENOUS PRN
Status: DISCONTINUED | OUTPATIENT
Start: 2025-04-03 | End: 2025-04-05 | Stop reason: HOSPADM

## 2025-04-03 RX ORDER — SODIUM CHLORIDE 9 MG/ML
INJECTION, SOLUTION INTRAVENOUS CONTINUOUS
Status: DISPENSED | OUTPATIENT
Start: 2025-04-03 | End: 2025-04-04

## 2025-04-03 RX ORDER — ONDANSETRON 4 MG/1
4 TABLET, ORALLY DISINTEGRATING ORAL EVERY 8 HOURS PRN
Status: DISCONTINUED | OUTPATIENT
Start: 2025-04-03 | End: 2025-04-05 | Stop reason: HOSPADM

## 2025-04-03 RX ORDER — IPRATROPIUM BROMIDE AND ALBUTEROL SULFATE 2.5; .5 MG/3ML; MG/3ML
1 SOLUTION RESPIRATORY (INHALATION)
Status: DISCONTINUED | OUTPATIENT
Start: 2025-04-03 | End: 2025-04-04

## 2025-04-03 RX ORDER — ONDANSETRON 2 MG/ML
4 INJECTION INTRAMUSCULAR; INTRAVENOUS EVERY 6 HOURS PRN
Status: DISCONTINUED | OUTPATIENT
Start: 2025-04-03 | End: 2025-04-05 | Stop reason: HOSPADM

## 2025-04-03 RX ORDER — POTASSIUM CHLORIDE 7.45 MG/ML
10 INJECTION INTRAVENOUS PRN
Status: DISCONTINUED | OUTPATIENT
Start: 2025-04-03 | End: 2025-04-05 | Stop reason: HOSPADM

## 2025-04-03 RX ORDER — SODIUM CHLORIDE 0.9 % (FLUSH) 0.9 %
5-40 SYRINGE (ML) INJECTION EVERY 12 HOURS SCHEDULED
Status: DISCONTINUED | OUTPATIENT
Start: 2025-04-03 | End: 2025-04-05 | Stop reason: HOSPADM

## 2025-04-03 RX ORDER — DEXTROSE MONOHYDRATE 100 MG/ML
INJECTION, SOLUTION INTRAVENOUS CONTINUOUS PRN
Status: DISCONTINUED | OUTPATIENT
Start: 2025-04-03 | End: 2025-04-05 | Stop reason: HOSPADM

## 2025-04-03 RX ORDER — GLUCAGON 1 MG/ML
1 KIT INJECTION PRN
Status: DISCONTINUED | OUTPATIENT
Start: 2025-04-03 | End: 2025-04-05 | Stop reason: HOSPADM

## 2025-04-03 RX ORDER — SPIRONOLACTONE 50 MG/1
100 TABLET, FILM COATED ORAL DAILY
Status: DISCONTINUED | OUTPATIENT
Start: 2025-04-04 | End: 2025-04-05 | Stop reason: HOSPADM

## 2025-04-03 RX ORDER — PANTOPRAZOLE SODIUM 40 MG/1
40 TABLET, DELAYED RELEASE ORAL EVERY MORNING
Status: DISCONTINUED | OUTPATIENT
Start: 2025-04-04 | End: 2025-04-05 | Stop reason: HOSPADM

## 2025-04-03 RX ORDER — FUROSEMIDE 40 MG/1
40 TABLET ORAL DAILY
Status: DISCONTINUED | OUTPATIENT
Start: 2025-04-04 | End: 2025-04-05 | Stop reason: HOSPADM

## 2025-04-03 RX ORDER — POTASSIUM CHLORIDE 1500 MG/1
40 TABLET, EXTENDED RELEASE ORAL PRN
Status: DISCONTINUED | OUTPATIENT
Start: 2025-04-03 | End: 2025-04-05 | Stop reason: HOSPADM

## 2025-04-03 RX ORDER — LACTULOSE 10 G/15ML
20 SOLUTION ORAL 3 TIMES DAILY
Qty: 2700 ML | Refills: 0 | Status: SHIPPED | OUTPATIENT
Start: 2025-04-03 | End: 2025-05-03

## 2025-04-03 RX ORDER — LACTULOSE 10 G/15ML
20 SOLUTION ORAL 3 TIMES DAILY
Status: DISCONTINUED | OUTPATIENT
Start: 2025-04-03 | End: 2025-04-05 | Stop reason: HOSPADM

## 2025-04-03 RX ADMIN — IPRATROPIUM BROMIDE AND ALBUTEROL SULFATE 1 DOSE: 2.5; .5 SOLUTION RESPIRATORY (INHALATION) at 20:31

## 2025-04-03 RX ADMIN — IOPAMIDOL 100 ML: 755 INJECTION, SOLUTION INTRAVENOUS at 12:40

## 2025-04-03 RX ADMIN — LACTULOSE 20 G: 10 SOLUTION ORAL at 13:36

## 2025-04-03 RX ADMIN — LACTULOSE 20 G: 10 SOLUTION ORAL at 20:20

## 2025-04-03 RX ADMIN — SODIUM CHLORIDE: 0.9 INJECTION, SOLUTION INTRAVENOUS at 16:09

## 2025-04-03 RX ADMIN — LORAZEPAM 1 MG: 1 TABLET ORAL at 20:20

## 2025-04-03 ASSESSMENT — PAIN DESCRIPTION - LOCATION: LOCATION: ABDOMEN

## 2025-04-03 ASSESSMENT — PAIN DESCRIPTION - FREQUENCY: FREQUENCY: CONTINUOUS

## 2025-04-03 ASSESSMENT — PAIN DESCRIPTION - ONSET: ONSET: ON-GOING

## 2025-04-03 ASSESSMENT — PAIN DESCRIPTION - PAIN TYPE: TYPE: ACUTE PAIN

## 2025-04-03 ASSESSMENT — PAIN SCALES - GENERAL
PAINLEVEL_OUTOF10: 3
PAINLEVEL_OUTOF10: 0
PAINLEVEL_OUTOF10: 0

## 2025-04-03 ASSESSMENT — PAIN - FUNCTIONAL ASSESSMENT
PAIN_FUNCTIONAL_ASSESSMENT: 0-10
PAIN_FUNCTIONAL_ASSESSMENT: PREVENTS OR INTERFERES SOME ACTIVE ACTIVITIES AND ADLS

## 2025-04-03 ASSESSMENT — PAIN DESCRIPTION - ORIENTATION: ORIENTATION: RIGHT;LEFT

## 2025-04-03 ASSESSMENT — PAIN DESCRIPTION - DESCRIPTORS: DESCRIPTORS: OTHER (COMMENT)

## 2025-04-03 NOTE — ED NOTES
ED TO INPATIENT SBAR HANDOFF    Patient Name: Vasile Meyer   :  1960  64 y.o.   Preferred Name  Yury  Family/Caregiver Present no   Restraints no   C-SSRS: Risk of Suicide: No Risk  Sitter no   Sepsis Risk Score        Situation  Chief Complaint   Patient presents with    Cough     Patient states he has a really bad cough. Started 4 weeks ago. Patient has had diarrhea, denies fever. Denies any additional symptoms.      Brief Description of Patient's Condition: Patient came to the ED with c/o a severe cough. On arrival patient was not responding as he usually does. More slow responses and was not able to come up with his medical history or medications. Patient stated he did drive himself to the ED. Patient recently had a fractured hip surgery and is using a crutch to help ambulate.   Mental Status: coherent  Arrived from: home    Imaging:   CTA PULMONARY W CONTRAST   Final Result      XR CHEST PORTABLE   Final Result        Abnormal labs:   Abnormal Labs Reviewed   CBC WITH AUTO DIFFERENTIAL - Abnormal; Notable for the following components:       Result Value    RBC 3.72 (*)     Hemoglobin 11.9 (*)     Hematocrit 36.1 (*)     MCH 32.0 (*)     Platelets 91 (*)     Lymphocytes % 23 (*)     Monocytes % 11 (*)     Eosinophils % 5 (*)     All other components within normal limits   COMPREHENSIVE METABOLIC PANEL - Abnormal; Notable for the following components:    CO2 17 (*)     Glucose 160 (*)     BUN 27 (*)     Total Bilirubin 1.1 (*)     AST 40 (*)     All other components within normal limits   AMMONIA - Abnormal; Notable for the following components:    Ammonia 107 (*)     All other components within normal limits   URINE DRUG SCREEN - Abnormal; Notable for the following components:    Benzodiazepine Screen, Urine POSITIVE (*)     All other components within normal limits        Background  History:   Past Medical History:   Diagnosis Date    A-fib (HCC)     Anxiety     \"Severe Anxiety Problems\"     Bradycardia. [] Other:     Last documented pain medication administered: [] Tylenol, [] Norco, [] Percocet, [] Morphine, [] Toradol, [] Other: NONE, Given at:     NIH Score: NIH NIH Stroke Scale  NIH Stroke Scale Assessed: Yes  Interval: Baseline  Level of Consciousness (1a): Alert  LOC Questions (1b): Answers both correctly  LOC Commands (1c): Performs both tasks correctly  Best Gaze (2): Normal  Visual (3): No visual loss  Facial Palsy (4): Normal symmetrical movement  Motor Arm, Left (5a): No drift  Motor Arm, Right (5b): No drift  Motor Leg, Left (6a): No drift  Motor Leg, Right (6b): No drift  Limb Ataxia (7): Absent  Sensory (8): Normal  Best Language (9): No aphasia  Dysarthria (10): Normal  Extinction and Inattention (11): No abnormality  Total: 0     Active LDA's:      Pertinent or High Risk Medications/Drips: no   If Yes, please provide details: [] 1 Unit, [] 2 Units, [] Completed, [] Infusing. Addition information:       Blood Product Administration: no  If Yes, please provide details: [] 1 Unit, [] 2 Units, [] Completed, [] Infusing. Addition information:     Recommendation    Incomplete orders none  Additional Comments: Did receive Lactulose in the ED. Patient states his last dose was three weeks ago, but then told the NP that is was 3 or 4 months ago.  Patient was unable to stand at the bedside to use the urinal with 2 assists.  If any further questions, please call Sending RN at ext: [phone]    Electronically signed by: Electronically signed by Cristel Rayo RN on 4/3/2025 at 2:48 PM

## 2025-04-03 NOTE — PROGRESS NOTES
4 Eyes Skin Assessment     NAME:  Vasile Meyer  YOB: 1960  MEDICAL RECORD NUMBER:  [id number]    The patient is being assessed for  Admission    I agree that at least one RN has performed a thorough Head to Toe Skin Assessment on the patient. ALL assessment sites listed below have been assessed.      Areas assessed by both nurses:    Head, Face, Ears, Shoulders, Back, Chest, Arms, Elbows, Hands, Sacrum. Buttock, Coccyx, Ischium, Legs. Feet and Heels, and Under Medical Devices         Does the Patient have a Wound? No noted wound(s)       Schuyler Prevention initiated by RN: No  Wound Care Orders initiated by RN: No    Pressure Injury (Stage 3,4, Unstageable, DTI, NWPT, and Complex wounds) if present, place Wound referral order by RN under : No    New Ostomies, if present place, Ostomy referral order under : No     Nurse 1 eSignature: Electronically signed by Domi Presley RN on 4/3/25 at 4:30 PM EDT    **SHARE this note so that the co-signing nurse can place an eSignature**    Nurse 2 eSignature: Electronically signed by Ayala Rivers RN on 4/3/25 at 4:31 PM EDT

## 2025-04-03 NOTE — ED TRIAGE NOTES
Patient arrived to ED with complaints of a persistent cough, was brought to room 4 per wheelchair. Patient is in bed with call light and bed rails up.

## 2025-04-03 NOTE — PLAN OF CARE
Problem: Chronic Conditions and Co-morbidities  Goal: Patient's chronic conditions and co-morbidity symptoms are monitored and maintained or improved  Outcome: Progressing     Problem: Pain  Goal: Verbalizes/displays adequate comfort level or baseline comfort level  Outcome: Progressing     Problem: Safety - Adult  Goal: Free from fall injury  Outcome: Progressing     Problem: Discharge Planning  Goal: Discharge to home or other facility with appropriate resources  Outcome: Progressing     Problem: Metabolic/Fluid and Electrolytes - Adult  Goal: Electrolytes maintained within normal limits  Outcome: Progressing  Goal: Hemodynamic stability and optimal renal function maintained  Outcome: Progressing  Goal: Glucose maintained within prescribed range  Outcome: Progressing

## 2025-04-03 NOTE — DISCHARGE INSTRUCTIONS
It is very important to take your lactulose every day 3 times daily to have 2-3 bowel movements daily. This prevents elevated ammonia level and confusion. If you are not having 2-3 bowel movements daily, you can increase your lactulose to 30 g with each dose.    You have also been started on Rifaximin which can help with elevated ammonia levels.     Per Dr. Vieyra's recommendations, your lasix has been changed to every other day.    You can stop taking Xarelto.    Come to the hospital in 5-7 days to have your lab work repeated. Make sure your results are sent to your PCP.    Health Centers     [facility]   [phone]  [address], [address] [place] [address]   Monday - Friday 8 am to 5pm  (All insurances or no insurance with sliding scale payment)     [facility]   [phone]  [address], [address] ([facility])  Monday - Friday 8 am to 5 pm  (All insurances or no insurance with sliding scale payment)     [facility]  [phone]   [address] [place] [address]   Monday - Friday 8 am to 8 pm (Medical)  Monday - Friday 8 am to 4:30 pm (Dental)  (All insurances or no insurance with sliding scale payment)      The [facility] and [facility] are available for hospital follow up appointments. Follow-up appointments are important and should be scheduled BEFORE you leave the hospital. They will help keep you well and at home instead of in the hospital.      Please also schedule a New Patient Appointment with the Primary Care Provider (PCP) of your choice. It normally takes a few weeks to get an appointment with a new PCP, so call TODAY. The PCP's listed below are all known to be accepting new patients.   If issues arise, call your health insurance for in network PCP options.    [place] [facility]    [facility] Physician Finder/Scheduling   [phone]    [facility]

## 2025-04-03 NOTE — H&P
V2.0  History and Physical      Name:  Vasile Meyer /Age/Sex: 1960  (64 y.o. male)   MRN & CSN:  [id number] & [id number] Encounter Date/Time: 4/3/2025 2:18 PM EDT   Location:   PCP: Nikunj Sharpe MD       Hospital Day: 1    Assessment and Plan:   Vasile Meyer is a 64 y.o. male  who presents with Hepatic encephalopathy (HCC)    Hospital Problems           Last Modified POA    * (Principal) Hepatic encephalopathy (HCC) 4/3/2025 Yes       Plan:      Acute encephalopathy, suspect hepatic etiology  Hx of cirrhosis Recent admission [facility] discharge 3/9/2025 for acute decompensation  Ammonia 107  Nonadherence to lactulose.  Reports constipation is intolerable side effect.  States last bowel movement was yesterday normal  CXR no acute   UDS positive for benzos (expected)  EtOH negative  LFTs appear overall stable  Resume lactulose 3 times daily, hold for more than 3 bowel movements/diarrhea  Screening labs for other etiology unremarkable  Dispo: Admit inpatient    SIRS  Persistent cough   presenting criteria on admission, tachycardia/tachypnea.  Low-grade temperature (99.2)   CTA negative for PE  RVP negative  Check pneumonia PCR  As needed symptom control  Continue pulmonary hygiene  Add bronchodilators  Monitor off antibiotics for now    Accelerated hypertension  Resume home antihypertensive regimen  PRN hydralazine    Chronic thrombocytopenia  Plt 91 and improved from previous trend  Continue to monitor    Paroxysmal atrial fibrillation  Long-term anticoagulation  History of right lower extremity DVT-2023  Continue Xarelto    DMII with chronic complications and without long term use of insulin.               -Monitor for hypoglycemia due to sliding scale insulin with serial Accu-Cheks.  -Hypoglycemic protocol  -Hold PO medications while inpatient  -Last A1c 6.5 (2025)    MDD/SHAWNA  -Patient is chronically on lorazepam     MAHESH-noncompliant with CPAP     Severe

## 2025-04-03 NOTE — ED PROVIDER NOTES
[facility]    Emergency Department Encounter      Patient: Vasile Meyer  MRN: [id number]  : 1960  Date of Evaluation: 4/3/2025  ED Provider:  Ifeoma Petersen DO    Triage Chief Complaint:   Cough (Patient states he has a really bad cough. Started 4 weeks ago. Patient has had diarrhea, denies fever. Denies any additional symptoms. )    [place]:  Vasile Meyer is a 64 y.o. male who  has a past medical history of A-fib (HCC), Anxiety, Arthritis, Arthritis of right hip, Back pain, Depression, Diabetes mellitus (HCC), Environmental allergies, GERD (gastroesophageal reflux disease), Glaucoma, H/O cardiovascular stress test, H/O echocardiogram, Hiatal hernia, Hiatal hernia, History of cardiac monitoring, Hyperlipidemia, Hypertension, Liver disease, Missing teeth, acquired, Obesity, PAF (paroxysmal atrial fibrillation) (HCC), Panic attacks, Pneumonia, PONV (postoperative nausea and vomiting), Prolonged emergence from general anesthesia, Shortness of breath, Sinusitis, Sleep apnea, Tobacco abuse, Wears dentures, and Wears glasses. and presents with   Cough for 3 weeks.  Also concerned he could die from his liver disease.  He has not been taking lactulose because he believes it makes him constipated.    Per Dr. Ronaldo Copeland: (3/6/2025  Decompensated Cirrhosis w/ ?alcohol vs. MAFLD        ROS - see HPI, below listed is current ROS at time of my eval:   systems reviewed and negative except as above.     Past Medical History:   Diagnosis Date    A-fib (HCC)     Anxiety     \"Severe Anxiety Problems\"    Arthritis     \"Hands, Back, Right Hip\"    Arthritis of right hip 2019    Dr Starks    Back pain     \"All The Time\"    Depression     Diabetes mellitus (HCC) Dx 's    Environmental allergies     GERD (gastroesophageal reflux disease)     Glaucoma     H/O cardiovascular stress test , 12/3/13    12/3/13 result:  Normal perfusion in the distribution of all coronaries, normal LV size and  Total Protein 7.8 6.4 - 8.2 g/dL    Albumin 3.4 3.4 - 5.0 g/dL    Albumin/Globulin Ratio 0.8     Total Bilirubin 1.1 (H) 0.0 - 1.0 mg/dL    Alkaline Phosphatase 128 40 - 129 U/L    ALT 27 10 - 40 U/L    AST 40 (H) 15 - 37 U/L   Lactic Acid Now and in 2 Hours   Result Value Ref Range    Lactic Acid 1.8 0.4 - 2.0 mmol/L   TSH   Result Value Ref Range    TSH 1.51 0.27 - 4.20 uIU/mL   Ammonia (select if history of liver disease or alcohol abuse)   Result Value Ref Range    Ammonia 107 (H) 16 - 60 umol/L   ETOH   Result Value Ref Range    Ethanol Lvl <10 <10 mg/dL   Urine Drug Screen   Result Value Ref Range    Amphetamine Screen, Ur NEGATIVE NEGATIVE    Barbiturate Screen, Ur NEGATIVE NEGATIVE    Benzodiazepine Screen, Urine POSITIVE (A) NEGATIVE    Cocaine Metabolite, Urine NEGATIVE NEGATIVE    Opiates, Urine NEGATIVE NEGATIVE    Cannabinoid Scrn, Ur NEGATIVE NEGATIVE    Oxycodone Screen, Ur NEGATIVE NEGATIVE    Fentanyl, Ur NEGATIVE NEGATIVE    Test Information       This method is a screening test to detect only these drug classes as part of a medical workup. Confirmatory testing by another method should be ordered if clinically indicated.   Brain Natriuretic Peptide   Result Value Ref Range    NT Pro-BNP <36 0 - 125 pg/mL   Urinalysis   Result Value Ref Range    Color, UA Yellow Yellow    Turbidity UA Clear Clear    Glucose, Ur NEGATIVE NEGATIVE mg/dL    Bilirubin, Urine NEGATIVE NEGATIVE    Ketones, Urine NEGATIVE NEGATIVE mg/dL    Specific Gravity, UA 1.025 1.005 - 1.030    Urine Hgb NEGATIVE NEGATIVE    pH, Urine 6.0 5.0 - 8.0    Protein, UA NEGATIVE NEGATIVE mg/dL    Urobilinogen, Urine 1.0 0.0 - 1.0 EU/dL    Nitrite, Urine NEGATIVE NEGATIVE    Leukocyte Esterase, Urine NEGATIVE NEGATIVE    Comment       Microscopic exam not performed based on chemical results unless requested in original order.   CK   Result Value Ref Range    Total  26 - 192 U/L   Platelet Confirmation   Result Value Ref Range

## 2025-04-04 LAB
AMMONIA PLAS-SCNC: 139 UMOL/L (ref 16–60)
BASOPHILS # BLD: 0 K/UL
BASOPHILS NFR BLD: 0 % (ref 0–1)
EOSINOPHIL # BLD: 0.21 K/UL
EOSINOPHILS RELATIVE PERCENT: 4 % (ref 0–3)
ERYTHROCYTE [DISTWIDTH] IN BLOOD BY AUTOMATED COUNT: 13.5 % (ref 11.7–14.9)
GLUCOSE BLD-MCNC: 179 MG/DL (ref 74–99)
GLUCOSE BLD-MCNC: 179 MG/DL (ref 74–99)
GLUCOSE BLD-MCNC: 191 MG/DL (ref 74–99)
GLUCOSE BLD-MCNC: 243 MG/DL (ref 74–99)
HCT VFR BLD AUTO: 31.9 % (ref 42–52)
HGB BLD-MCNC: 10.1 G/DL (ref 13.5–18)
INR PPP: 2.2
LYMPHOCYTES NFR BLD: 1.43 K/UL
LYMPHOCYTES RELATIVE PERCENT: 27 % (ref 24–44)
MAGNESIUM SERPL-MCNC: 2.1 MG/DL (ref 1.8–2.4)
MCH RBC QN AUTO: 31.7 PG (ref 27–31)
MCHC RBC AUTO-ENTMCNC: 31.7 G/DL (ref 32–36)
MCV RBC AUTO: 100 FL (ref 78–100)
MONOCYTES NFR BLD: 0.69 K/UL
MONOCYTES NFR BLD: 13 % (ref 0–4)
NEUTROPHILS NFR BLD: 56 % (ref 36–66)
NEUTS SEG NFR BLD: 2.97 K/UL
PLATELET # BLD AUTO: 76 K/UL (ref 140–440)
PLATELET CONFIRMATION: NORMAL
PLATELET ESTIMATE: NORMAL
PMV BLD AUTO: 10.7 FL (ref 7.5–11.1)
PROCALCITONIN SERPL-MCNC: 0.13 NG/ML
PROTHROMBIN TIME: 24.7 SEC (ref 11.7–14.5)
RBC # BLD AUTO: 3.19 M/UL (ref 4.6–6.2)
RBC # BLD: ABNORMAL 10*6/UL
WBC # BLD: NORMAL 10*3/UL
WBC OTHER # BLD: 5.3 K/UL (ref 4–10.5)

## 2025-04-04 PROCEDURE — 84145 PROCALCITONIN (PCT): CPT

## 2025-04-04 PROCEDURE — 82140 ASSAY OF AMMONIA: CPT

## 2025-04-04 PROCEDURE — 85610 PROTHROMBIN TIME: CPT

## 2025-04-04 PROCEDURE — 6370000000 HC RX 637 (ALT 250 FOR IP): Performed by: NURSE PRACTITIONER

## 2025-04-04 PROCEDURE — 2500000003 HC RX 250 WO HCPCS: Performed by: NURSE PRACTITIONER

## 2025-04-04 PROCEDURE — G0378 HOSPITAL OBSERVATION PER HR: HCPCS

## 2025-04-04 PROCEDURE — 94761 N-INVAS EAR/PLS OXIMETRY MLT: CPT

## 2025-04-04 PROCEDURE — 6370000000 HC RX 637 (ALT 250 FOR IP): Performed by: STUDENT IN AN ORGANIZED HEALTH CARE EDUCATION/TRAINING PROGRAM

## 2025-04-04 PROCEDURE — 94640 AIRWAY INHALATION TREATMENT: CPT

## 2025-04-04 PROCEDURE — 82962 GLUCOSE BLOOD TEST: CPT

## 2025-04-04 PROCEDURE — 83735 ASSAY OF MAGNESIUM: CPT

## 2025-04-04 PROCEDURE — 36415 COLL VENOUS BLD VENIPUNCTURE: CPT

## 2025-04-04 PROCEDURE — 85025 COMPLETE CBC W/AUTO DIFF WBC: CPT

## 2025-04-04 PROCEDURE — 2580000003 HC RX 258: Performed by: NURSE PRACTITIONER

## 2025-04-04 PROCEDURE — 1200000000 HC SEMI PRIVATE

## 2025-04-04 RX ORDER — IPRATROPIUM BROMIDE AND ALBUTEROL SULFATE 2.5; .5 MG/3ML; MG/3ML
1 SOLUTION RESPIRATORY (INHALATION) EVERY 4 HOURS PRN
Status: DISCONTINUED | OUTPATIENT
Start: 2025-04-04 | End: 2025-04-05 | Stop reason: HOSPADM

## 2025-04-04 RX ORDER — GUAIFENESIN/DEXTROMETHORPHAN 100-10MG/5
10 SYRUP ORAL EVERY 4 HOURS PRN
Status: DISCONTINUED | OUTPATIENT
Start: 2025-04-04 | End: 2025-04-05 | Stop reason: HOSPADM

## 2025-04-04 RX ORDER — BENZONATATE 100 MG/1
100 CAPSULE ORAL 3 TIMES DAILY PRN
Status: DISCONTINUED | OUTPATIENT
Start: 2025-04-04 | End: 2025-04-05 | Stop reason: HOSPADM

## 2025-04-04 RX ADMIN — SODIUM CHLORIDE: 0.9 INJECTION, SOLUTION INTRAVENOUS at 11:46

## 2025-04-04 RX ADMIN — LACTULOSE 20 G: 10 SOLUTION ORAL at 14:50

## 2025-04-04 RX ADMIN — LACTULOSE 20 G: 10 SOLUTION ORAL at 20:28

## 2025-04-04 RX ADMIN — IPRATROPIUM BROMIDE AND ALBUTEROL SULFATE 1 DOSE: 2.5; .5 SOLUTION RESPIRATORY (INHALATION) at 07:40

## 2025-04-04 RX ADMIN — RIFAXIMIN 550 MG: 550 TABLET ORAL at 08:31

## 2025-04-04 RX ADMIN — SODIUM CHLORIDE, PRESERVATIVE FREE 10 ML: 5 INJECTION INTRAVENOUS at 08:37

## 2025-04-04 RX ADMIN — RIVAROXABAN 20 MG: 20 TABLET, FILM COATED ORAL at 08:31

## 2025-04-04 RX ADMIN — Medication 6 MG: at 03:40

## 2025-04-04 RX ADMIN — LACTULOSE 20 G: 10 SOLUTION ORAL at 08:31

## 2025-04-04 RX ADMIN — PANTOPRAZOLE SODIUM 40 MG: 40 TABLET, DELAYED RELEASE ORAL at 08:31

## 2025-04-04 RX ADMIN — LORAZEPAM 1 MG: 1 TABLET ORAL at 08:36

## 2025-04-04 RX ADMIN — SODIUM CHLORIDE, PRESERVATIVE FREE 10 ML: 5 INJECTION INTRAVENOUS at 20:28

## 2025-04-04 RX ADMIN — IPRATROPIUM BROMIDE AND ALBUTEROL SULFATE 1 DOSE: 2.5; .5 SOLUTION RESPIRATORY (INHALATION) at 11:05

## 2025-04-04 RX ADMIN — LORAZEPAM 1 MG: 1 TABLET ORAL at 20:28

## 2025-04-04 RX ADMIN — RIFAXIMIN 550 MG: 550 TABLET ORAL at 20:28

## 2025-04-04 ASSESSMENT — PAIN SCALES - GENERAL
PAINLEVEL_OUTOF10: 0

## 2025-04-04 NOTE — PROGRESS NOTES
V2.0    [facility] Progress Note      Name:  Vasile Meyer /Age/Sex: 1960  (64 y.o. male)   MRN & CSN:  [id number] & [id number] Encounter Date/Time: 2025 10:17 AM EDT   Location:   PCP: Nikunj Sharpe MD     Attending:Jay Ngo MD       Hospital Day: 2    Assessment and Recommendations   Vasile Meyer is a 64 y.o. male  who presents with Hepatic encephalopathy (HCC)      Plan:      Acute encephalopathy, suspect hepatic etiology  Hx of cirrhosis Recent admission [facility] [place] operated by [facility] discharge 3/9/2025 for acute decompensation  Ammonia 107-upward trend 139 this morning  Nonadherence to lactulose.  Reports constipation is intolerable side effect.  Per nursing 1 bowel movement overnight  CXR non acute   UDS positive for benzos (expected)  EtOH negative  LFTs appear overall stable.  Improved trends today  Resume lactulose 3 times daily, hold for more than 3 bowel movements/diarrhea  Adding rifaximin per gastroenterology inpatient note review during previous admission recommended   screening labs for other etiology unremarkable  Dispo: Hopefully home next 24 to 48 hours.  Patient expressing strong desire to discharge today however given increased ammonia level and initiation of rifaximin.  Recommend continue monitoring at least for next 24 hours with repeat labs     SIRS  Persistent cough   presenting criteria on admission, tachycardia/tachypnea.  Low-grade temperature (99.2).  Currently HDS  CTA negative for PE  RVP negative  Pneumonia PCR pending-has not provided sputum sample  As needed symptom control  Continue pulmonary hygiene  Continue bronchodilators  Monitor off antibiotics for now     Accelerated hypertension  Resume home antihypertensive regimen  PRN hydralazine     Chronic thrombocytopenia  Plt 91->76  Continue to monitor.  Hold anticoagulation if continued downward trend for PLT less than 50K  .  Anemia, chronic macrocytic  H/H 10.1/31.9.  Downward trend.

## 2025-04-04 NOTE — CARE COORDINATION
04/04/25 0712   Service Assessment   Patient Orientation Alert and Oriented   Cognition Alert   History Provided By Patient   Primary Caregiver Spouse   Support Systems Spouse/Significant Other;Children;Family Members   Patient's Healthcare Decision Maker is: Patient Declined (Legal Next of Kin Remains as Decision Maker)   PCP Verified by CM No   Prior Functional Level Assistance with the following:;Mobility   Current Functional Level Assistance with the following:;Mobility   Can patient return to prior living arrangement Yes   Ability to make needs known: Good   Family able to assist with home care needs: Yes   Would you like for me to discuss the discharge plan with any other family members/significant others, and if so, who? No   Financial Resources Medicaid   Community Resources None   Social/Functional History   Lives With Spouse;Son;Daughter;Other (Comment)  (Wife, son, 2 daughters, and grandchildren)   Type of Home House   Home Layout Multi-level   Home Equipment Crutches   Receives Help From Family   Active  Yes  (Only drives occasionaly, wife usulally drives)   Discharge Planning   Type of Residence House   Living Arrangements Spouse/Significant Other;Children   Current Services Prior To Admission None   Patient expects to be discharged to: House   Services At/After Discharge   Services At/After Discharge None   Mode of Transport at Discharge Other (see comment)  (Family)   Confirm Follow Up Transport Family   Condition of Participation: Discharge Planning   The Plan for Transition of Care is related to the following treatment goals: Plans to return home   The Patient and/or Patient Representative was provided with a Choice of Provider? Patient   The Patient and/Or Patient Representative agree with the Discharge Plan? Yes   Freedom of Choice list was provided with basic dialogue that supports the patient's individualized plan of care/goals, treatment preferences, and shares the quality data  associated with the providers?  Yes     CM met with the patient to initiate discharge planning.  Patient lives in a two story home with his wife, son, two daughters, and grandchildren.  Patient has medical coverage, does not currently have a PCP (list of PCP's accepting new patients added to the AVS), stated that he is independent with ADLs, and is an active  but only drives occasionally (wife usually provides transportation).  Patient stated that he currently uses a crutch at home since having a right total hip arthroplasty in January of this year.  Patient did not require home oxygen prior to admission.  CM spoke with the patient regarding possible HHC services following discharge but he declined.  Patient plans to return home upon discharge and is unable to identify any needs at this time.  CM available if needs arise.

## 2025-04-04 NOTE — PLAN OF CARE
Problem: Chronic Conditions and Co-morbidities  Goal: Patient's chronic conditions and co-morbidity symptoms are monitored and maintained or improved  4/4/2025 1505 by Domi Presley RN  Outcome: Progressing  4/4/2025 0302 by Cristel Donnelly RN  Outcome: Progressing     Problem: Pain  Goal: Verbalizes/displays adequate comfort level or baseline comfort level  4/4/2025 1505 by Domi Presley RN  Outcome: Progressing  4/4/2025 0302 by Cristel Donnelly RN  Outcome: Progressing     Problem: Safety - Adult  Goal: Free from fall injury  4/4/2025 1505 by Domi Presley RN  Outcome: Progressing  4/4/2025 0302 by Cristel Donnelly RN  Outcome: Progressing     Problem: Discharge Planning  Goal: Discharge to home or other facility with appropriate resources  4/4/2025 1505 by Domi Presley RN  Outcome: Progressing  4/4/2025 0302 by Cristel Donnelly RN  Outcome: Progressing     Problem: Metabolic/Fluid and Electrolytes - Adult  Goal: Electrolytes maintained within normal limits  4/4/2025 1505 by Domi Presley RN  Outcome: Progressing  4/4/2025 0302 by Cristel Donnelly RN  Outcome: Progressing  Goal: Hemodynamic stability and optimal renal function maintained  4/4/2025 1505 by Domi Presley RN  Outcome: Progressing  4/4/2025 0302 by Cristel Donnelly RN  Outcome: Progressing  Goal: Glucose maintained within prescribed range  4/4/2025 1505 by Domi Presley RN  Outcome: Progressing  4/4/2025 0302 by Cristel Donnelly RN  Outcome: Progressing     Problem: ABCDS Injury Assessment  Goal: Absence of physical injury  4/4/2025 1505 by Domi Presley RN  Outcome: Progressing  4/4/2025 0302 by Cristel Donnelly RN  Outcome: Progressing

## 2025-04-04 NOTE — PROGRESS NOTES
This RN has reviewed and agrees with Hailee Yanes LPN's data collection and has collaborated with this LPN regarding the patient's care plan.

## 2025-04-04 NOTE — PLAN OF CARE
Problem: Chronic Conditions and Co-morbidities  Goal: Patient's chronic conditions and co-morbidity symptoms are monitored and maintained or improved  4/4/2025 0302 by Cristel Donnelly RN  Outcome: Progressing  4/3/2025 1629 by Domi Presley RN  Outcome: Progressing  Flowsheets (Taken 4/3/2025 1600)  Care Plan - Patient's Chronic Conditions and Co-Morbidity Symptoms are Monitored and Maintained or Improved:   Monitor and assess patient's chronic conditions and comorbid symptoms for stability, deterioration, or improvement   Collaborate with multidisciplinary team to address chronic and comorbid conditions and prevent exacerbation or deterioration     Problem: Pain  Goal: Verbalizes/displays adequate comfort level or baseline comfort level  4/4/2025 0302 by Cristel Donnelly RN  Outcome: Progressing  4/3/2025 1629 by Domi Presley RN  Outcome: Progressing     Problem: Safety - Adult  Goal: Free from fall injury  4/4/2025 0302 by Cristel Donnelly RN  Outcome: Progressing  4/3/2025 1629 by Domi Presley RN  Outcome: Progressing     Problem: Discharge Planning  Goal: Discharge to home or other facility with appropriate resources  4/4/2025 0302 by Cristel Donnelly RN  Outcome: Progressing  4/3/2025 1629 by Domi Presley RN  Outcome: Progressing  Flowsheets (Taken 4/3/2025 1600)  Discharge to home or other facility with appropriate resources:   Identify barriers to discharge with patient and caregiver   Refer to discharge planning if patient needs post-hospital services based on physician order or complex needs related to functional status, cognitive ability or social support system     Problem: Metabolic/Fluid and Electrolytes - Adult  Goal: Electrolytes maintained within normal limits  4/4/2025 0302 by Cristel Donnelly RN  Outcome: Progressing  4/3/2025 1629 by Domi Presley RN  Outcome: Progressing  Flowsheets (Taken 4/3/2025 1600)  Electrolytes maintained within normal limits:

## 2025-04-05 VITALS
RESPIRATION RATE: 22 BRPM | TEMPERATURE: 98.2 F | HEIGHT: 65 IN | SYSTOLIC BLOOD PRESSURE: 108 MMHG | HEART RATE: 88 BPM | OXYGEN SATURATION: 98 % | DIASTOLIC BLOOD PRESSURE: 65 MMHG | BODY MASS INDEX: 28.69 KG/M2 | WEIGHT: 172.19 LBS

## 2025-04-05 LAB
ALBUMIN SERPL-MCNC: 2.9 G/DL (ref 3.4–5)
ALBUMIN/GLOB SERPL: 0.8 {RATIO}
ALP SERPL-CCNC: 113 U/L (ref 40–129)
ALT SERPL-CCNC: 25 U/L (ref 10–40)
AMMONIA PLAS-SCNC: 156 UMOL/L (ref 16–60)
ANION GAP SERPL CALCULATED.3IONS-SCNC: 12 MMOL/L (ref 9–17)
AST SERPL-CCNC: 43 U/L (ref 15–37)
BILIRUB SERPL-MCNC: 0.7 MG/DL (ref 0–1)
BUN SERPL-MCNC: 18 MG/DL (ref 7–20)
CALCIUM SERPL-MCNC: 8.2 MG/DL (ref 8.3–10.6)
CHLORIDE SERPL-SCNC: 110 MMOL/L (ref 99–110)
CO2 SERPL-SCNC: 17 MMOL/L (ref 21–32)
CREAT SERPL-MCNC: 0.8 MG/DL (ref 0.8–1.3)
ERYTHROCYTE [DISTWIDTH] IN BLOOD BY AUTOMATED COUNT: 13.6 % (ref 11.7–14.9)
GFR, ESTIMATED: >90 ML/MIN/1.73M2
GLUCOSE BLD-MCNC: 153 MG/DL (ref 74–99)
GLUCOSE BLD-MCNC: 179 MG/DL (ref 74–99)
GLUCOSE SERPL-MCNC: 134 MG/DL (ref 74–99)
HCT VFR BLD AUTO: 31 % (ref 42–52)
HGB BLD-MCNC: 10.1 G/DL (ref 13.5–18)
INR PPP: 2.9
MCH RBC QN AUTO: 31.6 PG (ref 27–31)
MCHC RBC AUTO-ENTMCNC: 32.6 G/DL (ref 32–36)
MCV RBC AUTO: 96.9 FL (ref 78–100)
PLATELET # BLD AUTO: 65 K/UL (ref 140–440)
PLATELET CONFIRMATION: NORMAL
PMV BLD AUTO: 11.6 FL (ref 7.5–11.1)
POTASSIUM SERPL-SCNC: 4.2 MMOL/L (ref 3.5–5.1)
PROCALCITONIN SERPL-MCNC: 0.12 NG/ML
PROT SERPL-MCNC: 6.5 G/DL (ref 6.4–8.2)
PROTHROMBIN TIME: 30.7 SEC (ref 11.7–14.5)
RBC # BLD AUTO: 3.2 M/UL (ref 4.6–6.2)
SODIUM SERPL-SCNC: 139 MMOL/L (ref 136–145)
WBC OTHER # BLD: 4.4 K/UL (ref 4–10.5)

## 2025-04-05 PROCEDURE — 85610 PROTHROMBIN TIME: CPT

## 2025-04-05 PROCEDURE — 36415 COLL VENOUS BLD VENIPUNCTURE: CPT

## 2025-04-05 PROCEDURE — 6370000000 HC RX 637 (ALT 250 FOR IP): Performed by: NURSE PRACTITIONER

## 2025-04-05 PROCEDURE — 82140 ASSAY OF AMMONIA: CPT

## 2025-04-05 PROCEDURE — 94761 N-INVAS EAR/PLS OXIMETRY MLT: CPT

## 2025-04-05 PROCEDURE — G0378 HOSPITAL OBSERVATION PER HR: HCPCS

## 2025-04-05 PROCEDURE — 85027 COMPLETE CBC AUTOMATED: CPT

## 2025-04-05 PROCEDURE — 2500000003 HC RX 250 WO HCPCS: Performed by: NURSE PRACTITIONER

## 2025-04-05 PROCEDURE — 82962 GLUCOSE BLOOD TEST: CPT

## 2025-04-05 PROCEDURE — 84145 PROCALCITONIN (PCT): CPT

## 2025-04-05 PROCEDURE — 80053 COMPREHEN METABOLIC PANEL: CPT

## 2025-04-05 RX ORDER — FUROSEMIDE 40 MG/1
40 TABLET ORAL EVERY OTHER DAY
Qty: 60 TABLET | Refills: 3
Start: 2025-04-05

## 2025-04-05 RX ADMIN — LACTULOSE 20 G: 10 SOLUTION ORAL at 14:55

## 2025-04-05 RX ADMIN — PANTOPRAZOLE SODIUM 40 MG: 40 TABLET, DELAYED RELEASE ORAL at 07:51

## 2025-04-05 RX ADMIN — LORAZEPAM 1 MG: 1 TABLET ORAL at 07:51

## 2025-04-05 RX ADMIN — SODIUM CHLORIDE, PRESERVATIVE FREE 10 ML: 5 INJECTION INTRAVENOUS at 07:52

## 2025-04-05 RX ADMIN — RIVAROXABAN 20 MG: 20 TABLET, FILM COATED ORAL at 07:51

## 2025-04-05 RX ADMIN — RIFAXIMIN 550 MG: 550 TABLET ORAL at 07:51

## 2025-04-05 RX ADMIN — LACTULOSE 20 G: 10 SOLUTION ORAL at 07:51

## 2025-04-05 ASSESSMENT — PAIN SCALES - GENERAL: PAINLEVEL_OUTOF10: 0

## 2025-04-05 NOTE — DISCHARGE SUMMARY
V2.0  Discharge Summary    Name:  Vasile Meyer /Age/Sex: 1960 (64 y.o. male)   Admit Date: 4/3/2025  Discharge Date: 25    MRN & CSN:  [id number] & [id number] Encounter Date and Time 25 12:50 PM EDT    Attending:  Jay Ngo MD Discharging Provider: Delmy Boyd PA-C       Hospital Course:     Brief HPI: Vasile Meyer is a 64 y.o. male who presented with acute enephalopathy     Problems addressed during this hospitalization:     Acute encephalopathy, suspect hepatic etiology - resolved  Hx of cirrhosis   -Recent admission [facility] discharge 3/9/2025 for acute decompensation of cirrhosis.   -Nonadherence to lactulose.  Reports constipation is intolerable side effect.  Patient has now had multiple bowel movements.   -CXR non acute   -LFTs appear overall stable.    -Resume lactulose 3 times daily, hold for more than 3 bowel movements/diarrhea.   -Started rifaximin per previous GI recs  - degree of ammonia elevation does not correlate with patient's mental status. He is alert and oriented x4. He has had 3 bowel movements in the last 24 hours. He is able to tell me how he is supposed to take his lactulose at home and the reason for its importance. Safe to discharge home with close outpatient follow-up.   - resume home diuretics    SIRS, resolved  Persistent cough  - presenting criteria on admission, tachycardia/tachypnea.  Low-grade temperature (99.2).  Currently HDS  -CTA negative for PE  -RVP negative  -As needed symptom control  -Continue pulmonary hygiene  -Continue bronchodilators  - no indication for antibiotics     Accelerated hypertension  - resolved with restarting home medication      Chronic thrombocytopenia  - plts 65 on discharge. No signs of bleeding. Discussed with hematology - stop Xarelto. Recheck CBC in 5-7 days.     Anemia, chronic macrocytic  -Hgb 10.1. Stable on discharge.      Paroxysmal atrial fibrillation  - CHADs2-VASc - 2  - as per documentation. Was briefly on Eliquis

## 2025-04-05 NOTE — DISCHARGE SUMMARY
V2.0  Discharge Summary    Name:  Vasile Meyer /Age/Sex: 1960 (64 y.o. male)   Admit Date: 4/3/2025  Discharge Date: 25    MRN & CSN:  [id number] & [id number] Encounter Date and Time 25 12:50 PM EDT    Attending:  Jay Ngo MD Discharging Provider: Delmy Boyd PA-C       Hospital Course:     Brief HPI: Vasile Meyer is a 64 y.o. male who presented with acute enephalopathy     Problems addressed during this hospitalization:     Acute encephalopathy, suspect hepatic etiology - resolved  Hx of cirrhosis   -Recent admission [facility] discharge 3/9/2025 for acute decompensation of cirrhosis.   -Nonadherence to lactulose.  Reports constipation is intolerable side effect.  Patient has now had multiple bowel movements.   -CXR non acute   -LFTs appear overall stable.    -Resume lactulose 3 times daily, hold for more than 3 bowel movements/diarrhea.   -Started rifaximin per previous GI recs  - degree of ammonia elevation does not correlate with patient's mental status. He is alert and oriented x4. He has had 3 bowel movements in the last 24 hours. He is able to tell me how he is supposed to take his lactulose at home and the reason for its importance. Safe to discharge home with close outpatient follow-up.   - resume home diuretics    SIRS, resolved  Persistent cough  - presenting criteria on admission, tachycardia/tachypnea.  Low-grade temperature (99.2).  Currently HDS  -CTA negative for PE  -RVP negative  -As needed symptom control  -Continue pulmonary hygiene  -Continue bronchodilators  - no indication for antibiotics     Accelerated hypertension  - resolved with restarting home medication      Chronic thrombocytopenia  - plts 65 on discharge. No signs of bleeding. Discussed with hematology - stop Xarelto. Recheck CBC in 5-7 days.     Anemia, chronic macrocytic  -Hgb 10.1. Stable on discharge.      Paroxysmal atrial fibrillation  Long-term anticoagulation  History of right lower extremity  TECHNIQUE: Dynamic contrast-enhanced images were acquired utilizing IOPAMIDOL 76  % IV SOLN 100mL . Multiplanar 3-D computer reformations were also reviewed. MIP  images were also obtained and reviewed. CT radiation dose optimization techniques (automated exposure control, and use of iterative reconstruction techniques, or adjustment of the mA and/or kV according to patient size) were used to limit patient radiation dose. COMPARISON: No relevant previous examinations from the same anatomic region are available for comparison. FINDINGS:  Lungs are fully ventilated and clear. No infiltrates or effusions. The airways are patent. There is fair opacification of the pulmonary arteries. No emboli are suspected today. The aorta is tortuous. The great vessels appear unremarkable. No mediastinal or hilar abnormalities are seen in the cardiac size is normal. The esophagus follows a normal course. Imaging into the upper abdomen shows no acute findings. There is splenomegaly. IMPRESSION:  1. The lungs are clear. 2. No pulmonary embolism. 3. Splenomegaly.  Dictated and Electronically Signed By: Moi Hamilton DO 4/3/2025 12:56        XR CHEST PORTABLE  Result Date: 4/3/2025  XR CHEST PORTABLE DATE OF SERVICE:  4/3/2025 9:41 CLINICAL INDICATION:  Altered Mental Status PATIENT INFORMATION: Altered mental status COMPARISON: 3/5/2025 TECHNIQUE: AP portable projection FINDINGS:  No infiltrates or consolidations. The costophrenic angles are sharp. The cardiac size is normal and the mediastinum is not widened. IMPRESSION:  NONACUTE CHEST  Dictated and Electronically Signed By: Moi Hamilton DO 4/3/2025 10:02          CBC:   Recent Labs     04/03/25  1018 04/04/25  0638 04/05/25  0505   WBC 6.5 5.3 4.4   HGB 11.9* 10.1* 10.1*   PLT 91* 76* 65*     BMP:    Recent Labs     04/03/25  1018 04/05/25  0505    139   K 4.3 4.2    110   CO2 17* 17*   BUN 27* 18   CREATININE 1.0 0.8   GLUCOSE 160* 134*     Hepatic:   Recent Labs

## 2025-04-05 NOTE — PROGRESS NOTES
Patient was offered to get washed up today by this tech. Patient declined for the reason of they are getting discharged home today and he would like to do that independently once he is home. Staff VERÓNICA Cabezas made aware

## 2025-04-05 NOTE — PROGRESS NOTES
Discharge instructions given to patient, all questions answered, verbalized understanding, lab order explained and given to patient. Waiting on ride to arrive.

## 2025-04-05 NOTE — PLAN OF CARE
Problem: Chronic Conditions and Co-morbidities  Goal: Patient's chronic conditions and co-morbidity symptoms are monitored and maintained or improved  4/5/2025 1418 by Lisa Luis RN  Outcome: Completed  4/5/2025 0444 by Bev Can RN  Outcome: Progressing  Flowsheets (Taken 4/4/2025 1600 by Domi Presley, RN)  Care Plan - Patient's Chronic Conditions and Co-Morbidity Symptoms are Monitored and Maintained or Improved:   Monitor and assess patient's chronic conditions and comorbid symptoms for stability, deterioration, or improvement   Collaborate with multidisciplinary team to address chronic and comorbid conditions and prevent exacerbation or deterioration     Problem: Pain  Goal: Verbalizes/displays adequate comfort level or baseline comfort level  4/5/2025 1418 by Lisa Luis RN  Outcome: Completed  4/5/2025 0444 by Bev Can RN  Outcome: Progressing     Problem: Safety - Adult  Goal: Free from fall injury  4/5/2025 1418 by Lisa Luis RN  Outcome: Completed  4/5/2025 0444 by Bev Can RN  Outcome: Progressing     Problem: Discharge Planning  Goal: Discharge to home or other facility with appropriate resources  4/5/2025 1418 by Lisa Luis RN  Outcome: Completed  4/5/2025 0444 by Bev Can RN  Outcome: Progressing  Flowsheets (Taken 4/4/2025 1600 by Domi Presley, RN)  Discharge to home or other facility with appropriate resources:   Identify barriers to discharge with patient and caregiver   Refer to discharge planning if patient needs post-hospital services based on physician order or complex needs related to functional status, cognitive ability or social support system     Problem: Metabolic/Fluid and Electrolytes - Adult  Goal: Electrolytes maintained within normal limits  4/5/2025 1418 by Lisa Luis RN  Outcome: Completed  4/5/2025 0444 by Bev Can RN  Outcome: Progressing  Flowsheets (Taken 4/4/2025 1600 by Domi Presley,  RN)  Electrolytes maintained within normal limits:   Monitor labs and assess patient for signs and symptoms of electrolyte imbalances   Administer electrolyte replacement as ordered   Monitor response to electrolyte replacements, including repeat lab results as appropriate  Goal: Hemodynamic stability and optimal renal function maintained  4/5/2025 1418 by Lisa Luis RN  Outcome: Completed  4/5/2025 0444 by Bev Can RN  Outcome: Progressing  Flowsheets (Taken 4/4/2025 1600 by Domi Presley RN)  Hemodynamic stability and optimal renal function maintained:   Monitor labs and assess for signs and symptoms of volume excess or deficit   Monitor intake, output and patient weight  Goal: Glucose maintained within prescribed range  4/5/2025 1418 by Lisa Luis RN  Outcome: Completed  4/5/2025 0444 by Bev Can RN  Outcome: Progressing  Flowsheets (Taken 4/4/2025 1600 by Domi Presley, RN)  Glucose maintained within prescribed range:   Monitor blood glucose as ordered   Assess for signs and symptoms of hyperglycemia and hypoglycemia     Problem: ABCDS Injury Assessment  Goal: Absence of physical injury  4/5/2025 1418 by Lisa Luis RN  Outcome: Completed  4/5/2025 0444 by Bev Can RN  Outcome: Progressing

## 2025-04-05 NOTE — PLAN OF CARE
Problem: Chronic Conditions and Co-morbidities  Goal: Patient's chronic conditions and co-morbidity symptoms are monitored and maintained or improved  4/5/2025 0444 by Bev Can RN  Outcome: Progressing  Flowsheets (Taken 4/4/2025 1600 by Domi Presley RN)  Care Plan - Patient's Chronic Conditions and Co-Morbidity Symptoms are Monitored and Maintained or Improved:   Monitor and assess patient's chronic conditions and comorbid symptoms for stability, deterioration, or improvement   Collaborate with multidisciplinary team to address chronic and comorbid conditions and prevent exacerbation or deterioration  4/4/2025 1505 by Domi Presley RN  Outcome: Progressing  Flowsheets (Taken 4/4/2025 0830)  Care Plan - Patient's Chronic Conditions and Co-Morbidity Symptoms are Monitored and Maintained or Improved:   Monitor and assess patient's chronic conditions and comorbid symptoms for stability, deterioration, or improvement   Collaborate with multidisciplinary team to address chronic and comorbid conditions and prevent exacerbation or deterioration     Problem: Pain  Goal: Verbalizes/displays adequate comfort level or baseline comfort level  4/5/2025 0444 by Bev Can RN  Outcome: Progressing  4/4/2025 1505 by Domi Presley RN  Outcome: Progressing     Problem: Safety - Adult  Goal: Free from fall injury  4/5/2025 0444 by Bev Can RN  Outcome: Progressing  4/4/2025 1505 by Domi Presley RN  Outcome: Progressing     Problem: Discharge Planning  Goal: Discharge to home or other facility with appropriate resources  4/5/2025 0444 by Bev Can RN  Outcome: Progressing  Flowsheets (Taken 4/4/2025 1600 by Domi Presley RN)  Discharge to home or other facility with appropriate resources:   Identify barriers to discharge with patient and caregiver   Refer to discharge planning if patient needs post-hospital services based on physician order or complex needs related to

## 2025-05-13 ENCOUNTER — OFFICE VISIT (OUTPATIENT)
Dept: CARDIOLOGY CLINIC | Age: 65
End: 2025-05-13
Payer: MEDICAID

## 2025-05-13 VITALS
HEIGHT: 65 IN | BODY MASS INDEX: 29.79 KG/M2 | WEIGHT: 178.8 LBS | DIASTOLIC BLOOD PRESSURE: 68 MMHG | SYSTOLIC BLOOD PRESSURE: 116 MMHG | HEART RATE: 87 BPM

## 2025-05-13 DIAGNOSIS — G47.33 OSA (OBSTRUCTIVE SLEEP APNEA): ICD-10-CM

## 2025-05-13 DIAGNOSIS — D69.6 THROMBOCYTOPENIA: ICD-10-CM

## 2025-05-13 DIAGNOSIS — I48.0 PAROXYSMAL ATRIAL FIBRILLATION (HCC): ICD-10-CM

## 2025-05-13 DIAGNOSIS — E11.00 TYPE 2 DIABETES MELLITUS WITH HYPEROSMOLARITY WITHOUT COMA, WITHOUT LONG-TERM CURRENT USE OF INSULIN (HCC): ICD-10-CM

## 2025-05-13 DIAGNOSIS — I48.0 PAF (PAROXYSMAL ATRIAL FIBRILLATION) (HCC): Primary | ICD-10-CM

## 2025-05-13 DIAGNOSIS — R18.8 ASCITES OF LIVER: ICD-10-CM

## 2025-05-13 PROCEDURE — 99204 OFFICE O/P NEW MOD 45 MIN: CPT | Performed by: INTERNAL MEDICINE

## 2025-05-13 PROCEDURE — G8419 CALC BMI OUT NRM PARAM NOF/U: HCPCS | Performed by: INTERNAL MEDICINE

## 2025-05-13 PROCEDURE — 3017F COLORECTAL CA SCREEN DOC REV: CPT | Performed by: INTERNAL MEDICINE

## 2025-05-13 PROCEDURE — 3074F SYST BP LT 130 MM HG: CPT | Performed by: INTERNAL MEDICINE

## 2025-05-13 PROCEDURE — 3078F DIAST BP <80 MM HG: CPT | Performed by: INTERNAL MEDICINE

## 2025-05-13 PROCEDURE — G8427 DOCREV CUR MEDS BY ELIG CLIN: HCPCS | Performed by: INTERNAL MEDICINE

## 2025-05-13 PROCEDURE — 4004F PT TOBACCO SCREEN RCVD TLK: CPT | Performed by: INTERNAL MEDICINE

## 2025-05-13 PROCEDURE — 3044F HG A1C LEVEL LT 7.0%: CPT | Performed by: INTERNAL MEDICINE

## 2025-05-13 PROCEDURE — 2022F DILAT RTA XM EVC RTNOPTHY: CPT | Performed by: INTERNAL MEDICINE

## 2025-05-13 NOTE — PROGRESS NOTES
CARDIOLOGY CONSULTATION    Vasile Meyer  1960    Dear Dr. Sharpe, Nikunj Ahn MD    Thank you for asking me to participate in care of Vasile Meyer    Chief Complaint   Patient presents with    New Patient     Follow up from hospital  No chest pain, SOB dizziness, swelling or palpitations  Was seen in ED for cough      History of present illness:  Vasile Meyer is a 64 y.o. male is seeing me for history of PAF and obstructive sleep apnea.  He was recently in the hospital last month from April 3 to April 6 for hepatic encephalopathy and has been noted to have stage IV liver cirrhosis and has ascites.  Patient denies any shortness of breath.  He is not a good historian.  He has seen me in 2019 for preop clearance and at that time also he had history of PAF however has not had much recurrences in many years and had not tolerated antiarrhythmic therapy.  He had paracentesis they removed 4.3 L of michelle-colored fluid on 3/7/2025.  CTA pulmonary was negative for pulmonary embolism.  Denies any chest pains or cardiac symptoms.  He had right hip revision surgery done by Dr. Victoria 3 months ago at [facility].  He has appointment with Dr. Vieyra gastroenterologist for liver cirrhosis.  He does not have any history of alcohol abuse.  He said he walked quite a bit yesterday and did not have any shortness of breath.    REVIEW OF SYSTEMS:   Constitutional: Denies generalized weakness  Eyes:  Denies change in visual acuity  HENT:  Denies nasal congestion or sore throat  Respiratory:  Denies cough or shortness of breath  Cardiovascular: as in HPI  GI:  Denies abdominal pain, complains of nausea and vomiting  :  Denies dysuria  Musculoskeletal:  Denies back pain or joint pain  Integument:  Denies rash  Neurologic:  Denies headache, focal weakness or sensory changes  \"Remaining review of systems reviewed and negative.     Past medical history:  has a past medical history of A-fib (HCC),

## 2025-05-13 NOTE — ASSESSMENT & PLAN NOTE
Patient had not had much recurrence in many years that we know of.  Last EKG was sinus tachycardia.  He has history of not tolerating anticoagulation therapy.  He also has severe thrombocytopenia follows up with hematology.

## 2025-05-13 NOTE — ASSESSMENT & PLAN NOTE
Patient follows up with Dr. Arvizu hematologist.  Denies any excessive ecchymosis or bruising.  Recent platelet count was 65,000.  Avoid any antiplatelet therapy.

## 2025-06-03 ENCOUNTER — APPOINTMENT (OUTPATIENT)
Dept: CT IMAGING | Age: 65
End: 2025-06-03
Attending: EMERGENCY MEDICINE
Payer: MEDICAID

## 2025-06-03 ENCOUNTER — HOSPITAL ENCOUNTER (EMERGENCY)
Age: 65
Discharge: HOME | End: 2025-06-03
Attending: EMERGENCY MEDICINE
Payer: MEDICAID

## 2025-06-03 VITALS
SYSTOLIC BLOOD PRESSURE: 136 MMHG | DIASTOLIC BLOOD PRESSURE: 76 MMHG | OXYGEN SATURATION: 98 % | WEIGHT: 176.2 LBS | TEMPERATURE: 98.4 F | HEART RATE: 105 BPM | BODY MASS INDEX: 29.32 KG/M2 | RESPIRATION RATE: 18 BRPM

## 2025-06-03 DIAGNOSIS — R10.84 GENERALIZED ABDOMINAL PAIN: Primary | ICD-10-CM

## 2025-06-03 DIAGNOSIS — Z91.199 NONCOMPLIANCE: ICD-10-CM

## 2025-06-03 DIAGNOSIS — E72.20 HYPERAMMONEMIA: ICD-10-CM

## 2025-06-03 LAB
ALBUMIN SERPL-MCNC: 3.5 G/DL (ref 3.4–5)
ALBUMIN/GLOB SERPL: 0.8 {RATIO}
ALP SERPL-CCNC: 114 U/L (ref 40–129)
ALT SERPL-CCNC: 30 U/L (ref 10–40)
AMMONIA PLAS-SCNC: 230 UMOL/L (ref 16–60)
ANION GAP SERPL CALCULATED.3IONS-SCNC: 16 MMOL/L (ref 9–17)
AST SERPL-CCNC: 39 U/L (ref 15–37)
BASOPHILS # BLD: 0.07 K/UL
BASOPHILS NFR BLD: 1 % (ref 0–1)
BILIRUB SERPL-MCNC: 1.6 MG/DL (ref 0–1)
BUN SERPL-MCNC: 28 MG/DL (ref 7–20)
CALCIUM SERPL-MCNC: 8.9 MG/DL (ref 8.3–10.6)
CHLORIDE SERPL-SCNC: 101 MMOL/L (ref 99–110)
CO2 SERPL-SCNC: 18 MMOL/L (ref 21–32)
CREAT SERPL-MCNC: 1.5 MG/DL (ref 0.8–1.3)
EOSINOPHIL # BLD: 0.12 K/UL
EOSINOPHILS RELATIVE PERCENT: 2 % (ref 0–3)
ERYTHROCYTE [DISTWIDTH] IN BLOOD BY AUTOMATED COUNT: 15.4 % (ref 11.7–14.9)
GFR, ESTIMATED: 54 ML/MIN/1.73M2
GLUCOSE SERPL-MCNC: 191 MG/DL (ref 74–99)
HCT VFR BLD AUTO: 37.5 % (ref 42–52)
HGB BLD-MCNC: 12.7 G/DL (ref 13.5–18)
IMM GRANULOCYTES # BLD AUTO: 0.02 K/UL
IMM GRANULOCYTES NFR BLD: 0 %
LIPASE SERPL-CCNC: 52 U/L (ref 13–60)
LYMPHOCYTES NFR BLD: 1.08 K/UL
LYMPHOCYTES RELATIVE PERCENT: 19 % (ref 24–44)
MCH RBC QN AUTO: 31.4 PG (ref 27–31)
MCHC RBC AUTO-ENTMCNC: 33.9 G/DL (ref 32–36)
MCV RBC AUTO: 92.8 FL (ref 78–100)
MONOCYTES NFR BLD: 0.66 K/UL
MONOCYTES NFR BLD: 11 % (ref 0–5)
NEUTROPHILS NFR BLD: 67 % (ref 36–66)
NEUTS SEG NFR BLD: 3.89 K/UL
PLATELET # BLD AUTO: 75 K/UL (ref 140–440)
PLATELET CONFIRMATION: NORMAL
PMV BLD AUTO: 10.7 FL (ref 7.5–11.1)
POTASSIUM SERPL-SCNC: 4.7 MMOL/L (ref 3.5–5.1)
PROT SERPL-MCNC: 7.7 G/DL (ref 6.4–8.2)
RBC # BLD AUTO: 4.04 M/UL (ref 4.6–6.2)
SODIUM SERPL-SCNC: 134 MMOL/L (ref 136–145)
WBC OTHER # BLD: 5.8 K/UL (ref 4–10.5)

## 2025-06-03 PROCEDURE — 74177 CT ABD & PELVIS W/CONTRAST: CPT

## 2025-06-03 PROCEDURE — 6360000002 HC RX W HCPCS: Performed by: EMERGENCY MEDICINE

## 2025-06-03 PROCEDURE — 6360000004 HC RX CONTRAST MEDICATION: Performed by: EMERGENCY MEDICINE

## 2025-06-03 PROCEDURE — 96374 THER/PROPH/DIAG INJ IV PUSH: CPT

## 2025-06-03 PROCEDURE — 82140 ASSAY OF AMMONIA: CPT

## 2025-06-03 PROCEDURE — 99285 EMERGENCY DEPT VISIT HI MDM: CPT

## 2025-06-03 PROCEDURE — 85025 COMPLETE CBC W/AUTO DIFF WBC: CPT

## 2025-06-03 PROCEDURE — 96375 TX/PRO/DX INJ NEW DRUG ADDON: CPT

## 2025-06-03 PROCEDURE — 2580000003 HC RX 258: Performed by: EMERGENCY MEDICINE

## 2025-06-03 PROCEDURE — 83690 ASSAY OF LIPASE: CPT

## 2025-06-03 PROCEDURE — 6370000000 HC RX 637 (ALT 250 FOR IP): Performed by: EMERGENCY MEDICINE

## 2025-06-03 PROCEDURE — 80053 COMPREHEN METABOLIC PANEL: CPT

## 2025-06-03 RX ORDER — MORPHINE SULFATE 4 MG/ML
4 INJECTION, SOLUTION INTRAMUSCULAR; INTRAVENOUS ONCE
Status: COMPLETED | OUTPATIENT
Start: 2025-06-03 | End: 2025-06-03

## 2025-06-03 RX ORDER — SODIUM CHLORIDE, SODIUM LACTATE, POTASSIUM CHLORIDE, CALCIUM CHLORIDE 600; 310; 30; 20 MG/100ML; MG/100ML; MG/100ML; MG/100ML
500 INJECTION, SOLUTION INTRAVENOUS ONCE
Status: COMPLETED | OUTPATIENT
Start: 2025-06-03 | End: 2025-06-03

## 2025-06-03 RX ORDER — IOPAMIDOL 755 MG/ML
100 INJECTION, SOLUTION INTRAVASCULAR
Status: COMPLETED | OUTPATIENT
Start: 2025-06-03 | End: 2025-06-03

## 2025-06-03 RX ORDER — ONDANSETRON 2 MG/ML
4 INJECTION INTRAMUSCULAR; INTRAVENOUS EVERY 6 HOURS PRN
Status: DISCONTINUED | OUTPATIENT
Start: 2025-06-03 | End: 2025-06-03 | Stop reason: HOSPADM

## 2025-06-03 RX ADMIN — ONDANSETRON 4 MG: 2 INJECTION, SOLUTION INTRAMUSCULAR; INTRAVENOUS at 10:42

## 2025-06-03 RX ADMIN — IOPAMIDOL 100 ML: 755 INJECTION, SOLUTION INTRAVENOUS at 11:42

## 2025-06-03 RX ADMIN — MORPHINE SULFATE 4 MG: 4 INJECTION INTRAVENOUS at 10:44

## 2025-06-03 RX ADMIN — RIFAXIMIN 400 MG: 200 TABLET ORAL at 12:13

## 2025-06-03 RX ADMIN — SODIUM CHLORIDE, SODIUM LACTATE, POTASSIUM CHLORIDE, AND CALCIUM CHLORIDE 500 ML: .6; .31; .03; .02 INJECTION, SOLUTION INTRAVENOUS at 12:02

## 2025-06-03 ASSESSMENT — ENCOUNTER SYMPTOMS
VOMITING: 1
NAUSEA: 1
ABDOMINAL PAIN: 1

## 2025-06-03 ASSESSMENT — PAIN SCALES - GENERAL: PAINLEVEL_OUTOF10: 9

## 2025-06-03 ASSESSMENT — PAIN - FUNCTIONAL ASSESSMENT: PAIN_FUNCTIONAL_ASSESSMENT: PREVENTS OR INTERFERES SOME ACTIVE ACTIVITIES AND ADLS

## 2025-06-03 ASSESSMENT — PAIN DESCRIPTION - DESCRIPTORS: DESCRIPTORS: SHARP;DULL

## 2025-06-03 ASSESSMENT — PAIN DESCRIPTION - ORIENTATION: ORIENTATION: UPPER;MID

## 2025-06-03 ASSESSMENT — PAIN DESCRIPTION - LOCATION: LOCATION: ABDOMEN

## 2025-06-03 NOTE — ED PROVIDER NOTES
Triage Chief Complaint:   Abdominal Pain (C/o abd pains for three weeks , c/o vomiting for two days. Pt states the pain got worse three days ago)    [place]:  Vasile Meyer is a 65 y.o. male that presents to the ED ambulatory complaining of abdominal pain for 3 weeks    Patient was discharged from [facility] in April 5.  He had hepatic cephalopathy due to noncompliance with his lactulose.  He was recommended rifaximin he told me he only took it for couple weeks and apparently cannot get a refill.  Patient is a poor historian does not understand his meds.  He admits to mid abdominal pain.  Denies any alcohol.  No back or flank pain.  Admits to some nausea vomiting twice this morning.  He has not had any loose stools.  Denies any fever chills        Past Medical History:   Diagnosis Date    A-fib ([facility])     Anxiety     \"Severe Anxiety Problems\"    Arthritis     \"Hands, Back, Right Hip\"    Arthritis of right hip 03/26/2019    Dr Starks    Back pain     \"All The Time\"    Depression     Diabetes mellitus ([facility]) Dx 2000's    Environmental allergies     GERD (gastroesophageal reflux disease)     Glaucoma     H/O cardiovascular stress test 8/08, 12/3/13    12/3/13 result:  Normal perfusion in the distribution of all coronaries, normal LV size and function, EF 61%.    H/O echocardiogram 9/15/17,4/15/08    Mild concentric LVH with normal systolic function. EF 50-55% Trace TR  No evidence of pericardial effusion.     Hiatal hernia     Hiatal hernia     History of cardiac monitoring 09/18/2017    Conclusion: Findings suggesting normal sinus rhythm with physiologic heart rate variations and symptoms of palpitations during normal rate and rhythm and during mild degree of sinus tachycardia.     Hyperlipidemia     Hypertension     Liver disease     Missing teeth, acquired     states \"all my teeth are rotting away\"    Obesity     PAF (paroxysmal atrial fibrillation) ([facility])     follows with Dr Mabel Andres

## 2025-08-04 ENCOUNTER — OFFICE VISIT (OUTPATIENT)
Dept: CARDIOLOGY CLINIC | Age: 65
End: 2025-08-04
Payer: MEDICAID

## 2025-08-04 VITALS
HEART RATE: 90 BPM | SYSTOLIC BLOOD PRESSURE: 138 MMHG | HEIGHT: 66 IN | DIASTOLIC BLOOD PRESSURE: 72 MMHG | BODY MASS INDEX: 29.25 KG/M2 | WEIGHT: 182 LBS

## 2025-08-04 DIAGNOSIS — I77.810 DILATATION OF THORACIC AORTA: ICD-10-CM

## 2025-08-04 DIAGNOSIS — E11.00 TYPE 2 DIABETES MELLITUS WITH HYPEROSMOLARITY WITHOUT COMA, WITHOUT LONG-TERM CURRENT USE OF INSULIN (HCC): ICD-10-CM

## 2025-08-04 DIAGNOSIS — I48.0 PAF (PAROXYSMAL ATRIAL FIBRILLATION) (HCC): Primary | ICD-10-CM

## 2025-08-04 DIAGNOSIS — D69.6 THROMBOCYTOPENIA: ICD-10-CM

## 2025-08-04 PROCEDURE — 3044F HG A1C LEVEL LT 7.0%: CPT | Performed by: INTERNAL MEDICINE

## 2025-08-04 PROCEDURE — 99213 OFFICE O/P EST LOW 20 MIN: CPT | Performed by: INTERNAL MEDICINE

## 2025-08-04 PROCEDURE — 4004F PT TOBACCO SCREEN RCVD TLK: CPT | Performed by: INTERNAL MEDICINE

## 2025-08-04 PROCEDURE — 2022F DILAT RTA XM EVC RTNOPTHY: CPT | Performed by: INTERNAL MEDICINE

## 2025-08-04 PROCEDURE — G8419 CALC BMI OUT NRM PARAM NOF/U: HCPCS | Performed by: INTERNAL MEDICINE

## 2025-08-04 PROCEDURE — 3075F SYST BP GE 130 - 139MM HG: CPT | Performed by: INTERNAL MEDICINE

## 2025-08-04 PROCEDURE — 3017F COLORECTAL CA SCREEN DOC REV: CPT | Performed by: INTERNAL MEDICINE

## 2025-08-04 PROCEDURE — G8427 DOCREV CUR MEDS BY ELIG CLIN: HCPCS | Performed by: INTERNAL MEDICINE

## 2025-08-04 PROCEDURE — 3078F DIAST BP <80 MM HG: CPT | Performed by: INTERNAL MEDICINE

## (undated) DEVICE — PROTECTOR EYE PT SELF ADH NS OPT GRD LF

## (undated) DEVICE — DUAL LUMEN STOMACH TUBE: Brand: SALEM SUMP

## (undated) DEVICE — 3M™ IOBAN™ 2 ANTIMICROBIAL INCISE DRAPE 6650EZ: Brand: IOBAN™ 2

## (undated) DEVICE — MITT SURG PREP L ADH DISPOSABLE

## (undated) DEVICE — GAUZE,SPONGE,2"X2",8PLY,STERILE,LF,2'S: Brand: MEDLINE

## (undated) DEVICE — SHEARS ENDOSCP L36CM DIA5MM ULTRASONIC CRV TIP ADAPTIVE

## (undated) DEVICE — Z DISCONTINUED W/NO SUB ELECTRODE PT RET L9FT HI MOIST COND ADH HYDRGEL CORDED

## (undated) DEVICE — STRIP,CLOSURE,WOUND,MEDI-STRIP,1/2X4: Brand: MEDLINE

## (undated) DEVICE — SOLUTION IV IRRIG POUR BRL 0.9% SODIUM CHL 2F7124

## (undated) DEVICE — SET TBNG DISP TIP FOR AHTO

## (undated) DEVICE — SOLUTION IV IRRIG WATER 1000ML POUR BRL 2F7114

## (undated) DEVICE — SUTURE VCRL SZ 4-0 L18IN ABSRB UD L19MM PS-2 3/8 CIR PRIM J496H

## (undated) DEVICE — GLOVE ORANGE PI 7 1/2   MSG9075

## (undated) DEVICE — CIRCUIT BREATHING SINGLE LIMB AD 72 IN 3 LT 2 FILTER LIMBO

## (undated) DEVICE — FIRST ENTRY KIOS THRD 5X100MM ST

## (undated) DEVICE — SUTURE ETHBND EXCEL SZ 2-0 L36IN NONABSORBABLE GRN L26MM SH X523H

## (undated) DEVICE — GOWN,SIRUS,POLYRNF,BRTHSLV,XLN/XL,20/CS: Brand: MEDLINE

## (undated) DEVICE — TOWEL,OR,DSP,ST,BLUE,STD,6/PK,12PK/CS: Brand: MEDLINE

## (undated) DEVICE — GLOVE ORANGE PI 7   MSG9070

## (undated) DEVICE — TELFA NON-ADHERENT ABSORBENT DRESSING: Brand: TELFA

## (undated) DEVICE — DRIVER NDL L L54.5CM JAW L1.1CM DIA8MM 0-30DEG VERY HI CLS

## (undated) DEVICE — DRAPE SHEET ULTRAGARD: Brand: MEDLINE

## (undated) DEVICE — ARM DRAPE

## (undated) DEVICE — CADIERE FORCEPS: Brand: ENDOWRIST;DAVINCI SI

## (undated) DEVICE — GOWN,SIRUS,FABRNF,RAGLAN,XL,ST,28/CS: Brand: MEDLINE

## (undated) DEVICE — GLOVE ORANGE PI 8   MSG9080

## (undated) DEVICE — Z DUP USE 2641840 CLIP INT L POLYMER LOK LIG HEM O LOK

## (undated) DEVICE — TRAY CATH 16FR F INCLUDE SIL DRNGE BG STATLOK STBL DEV

## (undated) DEVICE — GLOVE,SURG,SENSICARE SLT,LF,PF,7.5: Brand: MEDLINE

## (undated) DEVICE — TAPE MED W1/8XL30IN WHT POLY

## (undated) DEVICE — BLADELESS OBTURATOR: Brand: WECK VISTA

## (undated) DEVICE — BLADELESS OBTURATOR, LONG: Brand: WECK VISTA

## (undated) DEVICE — SUTURE ETHBND EXCEL SZ 0 L36IN NONABSORBABLE GRN SH L26MM X524H

## (undated) DEVICE — CANNULA SEAL

## (undated) DEVICE — GLOVE SURG SZ 65 THK91MIL LTX FREE SYN POLYISOPRENE

## (undated) DEVICE — BLADE CLIPPER GEN PURP NS

## (undated) DEVICE — 20 ML SYRINGE LUER-LOCK TIP: Brand: MONOJECT

## (undated) DEVICE — SKIN AFFIX SURG ADHESIVE 72/CS 0.55ML: Brand: MEDLINE

## (undated) DEVICE — STRIP SKIN CLSR W0.25XL4IN WHT SPUNBOUND FBR NYL HI ADH

## (undated) DEVICE — PACK SURG LAP CHOLE

## (undated) DEVICE — LARGE HEM-O-LOK CLIP APPLIER: Brand: ENDOWRIST;DAVINCI SI

## (undated) DEVICE — SOLUTION IV 1000ML 0.9% SOD CHL FOR IRRIG PLAS CONT

## (undated) DEVICE — LINER,SEMI-RIGID,3000CC,50EA/CS: Brand: MEDLINE

## (undated) DEVICE — [HIGH FLOW HEATED INSUFFLATOR TUBING,  DO NOT USE IF PACKAGE IS DAMAGED]

## (undated) DEVICE — ADULT HEAD POSITIONER: Brand: DEVON

## (undated) DEVICE — FENESTRATED BIPOLAR FORCEPS: Brand: ENDOWRIST;DAVINCI SI

## (undated) DEVICE — LINER SUCT CANSTR 1500CC SEMI RIG W/ POR HYDROPHOBIC SHUT

## (undated) DEVICE — TUBING, SUCTION, 9/32" X 10', STRAIGHT: Brand: MEDLINE

## (undated) DEVICE — STANDARD HYPODERMIC NEEDLE,POLYPROPYLENE HUB: Brand: MONOJECT

## (undated) DEVICE — COLUMN DRAPE

## (undated) DEVICE — ACHIEVE NEEDLE BIOP SOFT TISSUE 18GX20CM: Brand: ACHIEVE